# Patient Record
Sex: FEMALE | Race: WHITE | NOT HISPANIC OR LATINO | Employment: OTHER | URBAN - METROPOLITAN AREA
[De-identification: names, ages, dates, MRNs, and addresses within clinical notes are randomized per-mention and may not be internally consistent; named-entity substitution may affect disease eponyms.]

---

## 2017-01-24 ENCOUNTER — OFFICE VISIT (OUTPATIENT)
Dept: OPHTHALMOLOGY | Facility: CLINIC | Age: 62
End: 2017-01-24

## 2017-01-24 DIAGNOSIS — D23.10 PAPILLOMA OF EYELID, LEFT: ICD-10-CM

## 2017-01-24 DIAGNOSIS — H04.129 DRY EYE: Primary | ICD-10-CM

## 2017-01-24 DIAGNOSIS — H52.13 MYOPIA, BILATERAL: ICD-10-CM

## 2017-01-24 ASSESSMENT — REFRACTION_WEARINGRX
SPECS_TYPE: SVL
OS_SPHERE: -6.25
OD_SPHERE: -7.00
OD_CYLINDER: SPHERE
OS_CYLINDER: SPHERE

## 2017-01-24 ASSESSMENT — VISUAL ACUITY
METHOD: SNELLEN - LINEAR
OS_CC: 20/20
OS_SC: J7
OD_SC: J7
CORRECTION_TYPE: GLASSES
OD_CC: 20/20

## 2017-01-24 ASSESSMENT — KERATOMETRY
OS_AXISANGLE2_DEGREES: 171
OS_K1POWER_DIOPTERS: 44.00
OS_K2POWER_DIOPTERS: 44.50
OD_AXISANGLE_DEGREES: 072
OD_K1POWER_DIOPTERS: 44.25
OS_AXISANGLE_DEGREES: 081
OD_K2POWER_DIOPTERS: 44.50
OD_AXISANGLE2_DEGREES: 162

## 2017-01-24 ASSESSMENT — TONOMETRY
IOP_METHOD: ICARE
OD_IOP_MMHG: 15
OS_IOP_MMHG: 13

## 2017-01-24 ASSESSMENT — REFRACTION_CURRENTRX
OD_BASECURVE: 8.5
OS_BRAND: ALCON
OS_BASECURVE: 8.5
OS_SPHERE: -5.50
OS_DIAMETER: 14.1
OD_BRAND: ALCON
OD_SPHERE: -6.50
OD_DIAMETER: 14.1

## 2017-01-24 ASSESSMENT — EXTERNAL EXAM - RIGHT EYE: OD_EXAM: NORMAL

## 2017-01-24 ASSESSMENT — REFRACTION_MANIFEST
OS_SPHERE: -6.00
OD_CYLINDER: SPHERE
OD_ADD: +2.00
OS_ADD: +2.00
OD_SPHERE: -7.00
OS_CYLINDER: SPHERE

## 2017-01-24 ASSESSMENT — SLIT LAMP EXAM - LIDS: COMMENTS: NORMAL

## 2017-01-24 ASSESSMENT — EXTERNAL EXAM - LEFT EYE: OS_EXAM: NORMAL

## 2017-01-24 ASSESSMENT — CONF VISUAL FIELD
OD_NORMAL: 1
METHOD: COUNTING FINGERS
OS_NORMAL: 1

## 2017-01-24 NOTE — NURSING NOTE
Chief Complaints and History of Present Illnesses   Patient presents with     Annual Eye Exam     Contact Lens Evaluation     aware of $75 dollar fee     HPI    Affected eye(s):  Both   Symptoms:     No blurred vision      Frequency:  Constant       Do you have eye pain now?:  No      Comments:  Pt states vision seems more blurred with reading. Pt states that contacts are okay- but gets the monovision lenses mixed up. Pt did not bring contacts with and unsure of brand. Pt states would like to get fitted today and states she has dry eyes.    Loraine Bellamy COT 11:56 AM January 24, 2017

## 2017-01-24 NOTE — MR AVS SNAPSHOT
After Visit Summary   1/24/2017    Idalia Hoang    MRN: [id number]           Patient Information     Date Of Birth          1955        Visit Information        Provider Department      1/24/2017 11:40 AM Gera Tsang OD M [facility] Ophthalmology         Follow-ups after your visit        Your next 10 appointments already scheduled     Feb 07, 2017 11:00 AM   (Arrive by 10:45 AM)   Return Visit with MD SANTI Lemus [facility] Dermatology ([facility])    [address] [place] [address]  [address]rd [facility] [address] [phone]            Feb 07, 2017  1:00 PM   (Arrive by 12:45 PM)   RETURN GENERAL with RYAN Mix [facility] Ophthalmology ([facility])    [address] [place] [address]  [address]th [facility] [address] [phone]              Who to contact     Please call your clinic at [phone] to:    Ask questions about your health    Make or cancel appointments    Discuss your medicines    Learn about your test results    Speak to your doctor   If you have compliments or concerns about an experience at your clinic, or if you wish to file a complaint, please contact [facility] Patient Relations at [phone] or email us at [email or web address]         Additional Information About Your Visit        MyChart Information     [employer] gives you secure access to your electronic health record. If you see a primary care provider, you can also send messages to your care team and make appointments. If you have questions, please call your primary care clinic.  If you do not have a primary care provider, please call [phone] and they will assist you.      [employer] is an electronic gateway that provides easy, online access to your medical records. With [employer], you can request a clinic appointment, read your test results, renew a prescription or communicate with your care team.     To access your  existing account, please contact your [facility] or call [phone] for assistance.        Care EveryWhere ID     This is your Care EveryWhere ID. This could be used by other organizations to access your [facility] medical records  [id number]         Blood Pressure from Last 3 Encounters:   04/25/16 116/68   07/01/15 106/66   03/25/15 108/72    Weight from Last 3 Encounters:   04/25/16 65.772 kg (145 lb)   07/01/15 65.772 kg (145 lb)   04/27/15 65.772 kg (145 lb)              Today, you had the following     No orders found for display       Primary Care Provider Office Phone # Fax #    MARCOS Nelson [phone]       [facility] [address] [address]        Thank you!     Thank you for choosing [facility] OPHTHALMOLOGY  for your care. Our goal is always to provide you with excellent care. Hearing back from our patients is one way we can continue to improve our services. Please take a few minutes to complete the written survey that you may receive in the mail after your visit with us. Thank you!             Your Updated Medication List - Protect others around you: Learn how to safely use, store and throw away your medicines at www.disposemymeds.org.          This list is accurate as of: 1/24/17 12:50 PM.  Always use your most recent med list.                   Brand Name Dispense Instructions for use    doxycycline 100 MG capsule    VIBRAMYCIN    20 capsule    Take 1 capsule (100 mg) by mouth 2 times daily       fluocinonide 0.05 % ointment    LIDEX    60 g    Apply topically 2 times daily To areas of itch and rash on hand       * levothyroxine 88 MCG tablet    SYNTHROID/LEVOTHROID    90 tablet    TAKE ONE TABLET BY MOUTH ONCE DAILY       * levothyroxine 88 MCG tablet    SYNTHROID/LEVOTHROID    90 tablet    Take 1 tablet (88 mcg) by mouth daily       * levothyroxine 88 MCG tablet    SYNTHROID/LEVOTHROID    90 tablet    TAKE ONE TABLET  BY MOUTH ONCE DAILY       Urea 40 % Crea     198 g    Twice daily to areas of thick scaling on hands and feet.  Can cover with glove or sock at bedtime.       venlafaxine 37.5 MG 24 hr capsule    EFFEXOR-XR    30 capsule    Take 1 capsule (37.5 mg) by mouth daily       * Notice:  This list has 3 medication(s) that are the same as other medications prescribed for you. Read the directions carefully, and ask your doctor or other care provider to review them with you.

## 2017-01-25 DIAGNOSIS — F33.9 EPISODE OF RECURRENT MAJOR DEPRESSIVE DISORDER, UNSPECIFIED DEPRESSION EPISODE SEVERITY (H): Primary | ICD-10-CM

## 2017-01-27 RX ORDER — VENLAFAXINE HYDROCHLORIDE 37.5 MG/1
CAPSULE, EXTENDED RELEASE ORAL
Qty: 30 CAPSULE | Refills: 5 | Status: SHIPPED | OUTPATIENT
Start: 2017-01-27 | End: 2017-10-23

## 2017-01-27 NOTE — TELEPHONE ENCOUNTER
Effexor XR 37.5mg 24hr cap    Last Written Prescription Date: 12-  Last Fill Quantity: 30, # refills: 0  Last Office Visit with G, P or [facility] prescribing provider: 1-        BP Readings from Last 3 Encounters:   04/25/16 116/68   07/01/15 106/66   03/25/15 108/72     Pulse: (for Fetzima)  CREATININE   Date Value Ref Range Status   07/18/2016 0.78 0.52 - 1.04 mg/dL Final   ]    Last PHQ-9 score on record=   PHQ-9 SCORE 4/25/2016   Total Score -   Total Score 7

## 2017-02-07 ENCOUNTER — OFFICE VISIT (OUTPATIENT)
Dept: DERMATOLOGY | Facility: CLINIC | Age: 62
End: 2017-02-07

## 2017-02-07 ENCOUNTER — OFFICE VISIT (OUTPATIENT)
Dept: OPHTHALMOLOGY | Facility: CLINIC | Age: 62
End: 2017-02-07

## 2017-02-07 DIAGNOSIS — L57.8 SUN-DAMAGED SKIN: ICD-10-CM

## 2017-02-07 DIAGNOSIS — Z80.8 FAMILY HISTORY OF MELANOMA: ICD-10-CM

## 2017-02-07 DIAGNOSIS — Z12.83 SCREENING EXAM FOR SKIN CANCER: ICD-10-CM

## 2017-02-07 DIAGNOSIS — L28.0 LICHEN SIMPLEX CHRONICUS: Primary | ICD-10-CM

## 2017-02-07 DIAGNOSIS — H52.13 MYOPIA, BILATERAL: Primary | ICD-10-CM

## 2017-02-07 DIAGNOSIS — L81.4 SOLAR LENTIGO: ICD-10-CM

## 2017-02-07 DIAGNOSIS — D18.01 CHERRY ANGIOMA: ICD-10-CM

## 2017-02-07 DIAGNOSIS — L82.1 SK (SEBORRHEIC KERATOSIS): ICD-10-CM

## 2017-02-07 ASSESSMENT — CUP TO DISC RATIO
OD_RATIO: 0.15
OS_RATIO: 0.15

## 2017-02-07 ASSESSMENT — TONOMETRY
OS_IOP_MMHG: 20
IOP_METHOD: ICARE
OD_IOP_MMHG: 20

## 2017-02-07 ASSESSMENT — REFRACTION_CURRENTRX
OS_BASECURVE: 8.5
OS_BRAND: ALCON
OS_SPHERE: -5.50
OS_DIAMETER: 14.1
OD_BRAND: ALCON
OD_BASECURVE: 8.5
OD_SPHERE: -6.50
OD_DIAMETER: 14.1

## 2017-02-07 ASSESSMENT — VISUAL ACUITY
METHOD: SNELLEN - LINEAR
OS_CC: 20/20
OD_CC: 20/20
CORRECTION_TYPE: CONTACTS
OD_CC: J3
OS_CC: J3

## 2017-02-07 ASSESSMENT — EXTERNAL EXAM - RIGHT EYE: OD_EXAM: NORMAL

## 2017-02-07 ASSESSMENT — PAIN SCALES - GENERAL: PAINLEVEL: NO PAIN (0)

## 2017-02-07 ASSESSMENT — EXTERNAL EXAM - LEFT EYE: OS_EXAM: NORMAL

## 2017-02-07 ASSESSMENT — CONF VISUAL FIELD
OD_NORMAL: 1
METHOD: COUNTING FINGERS
OS_NORMAL: 1

## 2017-02-07 ASSESSMENT — SLIT LAMP EXAM - LIDS: COMMENTS: NORMAL

## 2017-02-07 NOTE — MR AVS SNAPSHOT
After Visit Summary   2/7/2017    Idalia Hoang    MRN: [id number]           Patient Information     Date Of Birth          1955        Visit Information        Provider Department      2/7/2017 11:00 AM Dawson Silver MD [facility]        Today's Diagnoses     Lichen simplex chronicus    -  1       Care Instructions    Instructions:  The areas on your nose are called Lentigines. You can treat this with tretinoin cream  -Tretinoin cream: use pea sized amount applied to the face. Use every other day for a week, then can start using daily. If your skin starts to peel and burn, stop for 2-3 days and then can resume  -Follow up in 1 year for a skin check      The ABCDEs of Melanoma    Skin cancer can develop anywhere on the skin. Ask someone for help when checking your skin, especially in hard to see places. If you notice a mole different from others, or that changes, enlarges, itches, or bleeds (even if it is small), you should see a dermatologist.          Sun Protection  Recommended use of a broad spectrum sunscreen of at least SPF 30 on all sun exposed sites daily.  Apply 20 minutes prior to exposure and repeat application every two hours or after sweating or swimming.  Avoid any intentional indoor or outdoor tanning.            Follow-ups after your visit        Your next 10 appointments already scheduled     Feb 07, 2017  1:00 PM   (Arrive by 12:45 PM)   RETURN GENERAL with RYAN Mix [facility] Ophthalmology ([facility])    [address] [place] [address] [phone]              Who to contact     Please call your clinic at [phone] to:    Ask questions about your health    Make or cancel appointments    Discuss your medicines    Learn about your test results    Speak to your doctor   If you have compliments or concerns about an experience at your clinic, or if you wish to file a complaint, please contact  [facility] Patient Relations at [phone] or email us at [email or web address]         Additional Information About Your Visit        [email or web address] Information     [email or web address] gives you secure access to your electronic health record. If you see a primary care provider, you can also send messages to your care team and make appointments. If you have questions, please call your primary care clinic.  If you do not have a primary care provider, please call [phone] and they will assist you.      [employer] is an electronic gateway that provides easy, online access to your medical records. With [employer] you can request a clinic appointment, read your test results, renew a prescription or communicate with your care team.     To access your existing account, please contact your [facility] or call [phone] for assistance.        Care EveryWhere ID     This is your Care EveryWhere ID. This could be used by other organizations to access your [place] medical records  [id number]         Blood Pressure from Last 3 Encounters:   04/25/16 116/68   07/01/15 106/66   03/25/15 108/72    Weight from Last 3 Encounters:   04/25/16 65.772 kg (145 lb)   07/01/15 65.772 kg (145 lb)   04/27/15 65.772 kg (145 lb)              Today, you had the following     No orders found for display         Today's Medication Changes          These changes are accurate as of: 2/7/17 12:17 PM.  If you have any questions, ask your nurse or doctor.               These medicines have changed or have updated prescriptions.        Dose/Directions    levothyroxine 88 MCG tablet   Commonly known as:  SYNTHROID/LEVOTHROID   This may have changed:  Another medication with the same name was removed. Continue taking this medication, and follow the directions you see here.   Used for:  Acquired hypothyroidism   Changed by:  Love Noriega, PA        TAKE ONE TABLET BY MOUTH ONCE DAILY   Quantity:   90 tablet   Refills:  2         Stop taking these medicines if you haven't already. Please contact your care team if you have questions.     doxycycline 100 MG capsule   Commonly known as:  VIBRAMYCIN   Stopped by:  Dawson Silver MD           fluocinonide 0.05 % ointment   Commonly known as:  LIDEX   Stopped by:  Dawson Silver MD                    Primary Care Provider Office Phone # Fax #    Love PAL MARCOS Noriega [phone]       [facility] [address] [place] [address]        Thank you!     Thank you for choosing [facility] DERMATOLOGY  for your care. Our goal is always to provide you with excellent care. Hearing back from our patients is one way we can continue to improve our services. Please take a few minutes to complete the written survey that you may receive in the mail after your visit with us. Thank you!             Your Updated Medication List - Protect others around you: Learn how to safely use, store and throw away your medicines at www.disposemymeds.org.          This list is accurate as of: 2/7/17 12:17 PM.  Always use your most recent med list.                   Brand Name Dispense Instructions for use    levothyroxine 88 MCG tablet    SYNTHROID/LEVOTHROID    90 tablet    TAKE ONE TABLET BY MOUTH ONCE DAILY       Urea 40 % Crea     198 g    Twice daily to areas of thick scaling on hands and feet.  Can cover with glove or sock at bedtime.       venlafaxine 37.5 MG 24 hr capsule    EFFEXOR-XR    30 capsule    TAKE ONE CAPSULE BY MOUTH ONCE DAILY

## 2017-02-07 NOTE — PROGRESS NOTES
Assessment/Plan  1. Compound myopia OU   Dispensed final contact lens prescription. Monitor annually. Patient to call clinic regarding lens ordering.  Contact Lens Billing  V-Code:  - Soft spherical  Final Contact Lens Rx      Brand Base Curve Diameter Sphere Lens   Right Mina 8.5 14.1 -6.50 Dailies Total 1   Left Mina 8.5 14.1 -5.50 Dailies Total 1       Expiration Date:  2/8/2019    Replacement:  Daily    Wearing Schedule:  Daily wear         Price per Unit: $95/90 pack    These are for cosmetic contact lenses.    Encounter Diagnosis   Name Primary?     Myopia, bilateral [H52.13] Yes        Date of last eye exam: 1/25/17           Complete documentation of historical and exam elements from today's encounter can  be found in the full encounter summary report (not reduplicated in this progress  note). I personally obtained the chief complaint(s) and history of present illness. I  confirmed and edited as necessary the review of systems, past medical/surgical  history, family history, social history, and examination findings as documented by  others; and I examined the patient myself. I personally reviewed the relevant tests,  images, and reports as documented above. I formulated and edited as necessary the  assessment and plan and discussed the findings and management plan with the  patient and family.    Gera Tsang OD, FAAO

## 2017-02-07 NOTE — PROGRESS NOTES
[facility] Dermatology Note      Dermatology Problem List:  1.Lichen simplex chronicus of the right palm- has had a partial response to Lidex ointment, also has used urea 40% cream for its keratolytic properties.  2. Tinea pedis with some aspect of plantar hyperkeratosis- used econazole cream twice daily for the next several weeks.  3. Family history of skin cancer- sister with melanoma, mother with SCC, maternal aunt with BCC    Encounter Date: Feb 7, 2017    CC:  Chief Complaint   Patient presents with     Skin Check     Idalia notes that her sister was recently diagnosed with desmoplastic melanoma. Idalia has a few spots of concern today.         History of Present Illness:  Ms. Idalia Hoang is a 61 year old female who presents for evaluation of skin with family history of melanoma and skin cancers.    Idalia's sister was recently diagnosed with melanoma, and for this reason presents today for a skin check. She has noticed 2 raised lesions on her upper left chest, which she thinks may have grown. Otherwise  no new or concerning lesions, she does not report any painful, bleeding, or non-healing skin lesions and has no other concerns at this time. She has had a lot of sun exposure 2/2 skiing in the 80s. She currently is using daily sunscreen on her face year round, and on any sun-exposed skin in the spring and summer.    Past Medical History:   Patient Active Problem List   Diagnosis     Advanced care planning/counseling discussion     Lichen simplex chronicus     Acquired hypothyroidism     Estradiol deficiency     Mixed hyperlipidemia     ACP (advance care planning)     Past Medical History   Diagnosis Date     Hypothyroidism 01/01/2011     Depression, recurrent 01/01/2011     Chronic serous otitis media, simple or unspecified 06/18/2012     Dysfunction of eustachian tube 06/18/2012     Osteopenia      Acquired hypothyroidism 4/25/2016     Estradiol deficiency 4/25/2016     Mixed hyperlipidemia  4/25/2016     Past Surgical History   Procedure Laterality Date     Surgical removal  2010     tendon nodule; free of disease     Colonoscopy  04/20/2012       Social History:  The patient denies use of tanning beds.    Family History:  There is no family history of skin cancer with melanoma in her sister, SCC in her mother and possibly BCC in her maternal aunt.     Medications:  Current Outpatient Prescriptions   Medication Sig Dispense Refill     venlafaxine (EFFEXOR-XR) 37.5 MG 24 hr capsule TAKE ONE CAPSULE BY MOUTH ONCE DAILY 30 capsule 5     levothyroxine (SYNTHROID, LEVOTHROID) 88 MCG tablet TAKE ONE TABLET BY MOUTH ONCE DAILY 90 tablet 2     Urea 40 % CREA Twice daily to areas of thick scaling on hands and feet.  Can cover with glove or sock at bedtime. 198 g 3     No Known Allergies      Review of Systems:  -Const: Denies fevers, chills or changes in weight.   -Constitutional: The patient denies fatigue, chills, unintended weight loss, and night sweats.  -HEENT: Reports recent URI with congestion, cough, and fever-which has since resolved. Patient denies nonhealing oral sores.  -Skin: As above in HPI. No additional skin concerns.    Physical exam:  Vitals: There were no vitals taken for this visit.  GEN: This is a well developed, well-nourished female in no acute distress, in a pleasant mood.    SKIN: Full skin, which includes the head/face, both arms, chest, back, abdomen,both legs, genitalia and/or groin buttocks, digits and/or nails, was examined.  -Scattered 2-6 mm homogenously brown and symmetric macules and papules with regular reticular pigment network on dermoscopy over the face, trunk and bilateral upper extremities  -Several light brown stuck on appearing papules on the trunk and upper extremities with cerebriform architecture on dermoscopy  -No other lesions of concern on areas examined.     Impression/Plan:  1. Family history of melanoma and non-melanotic skin cancer- hx of melanoma in sister,  SCC with mother, and non-melanoma with her maternal aunt.     ABCDs of melanoma were discussed and self skin checks were advised.     Sun precaution was advised including the use of sun screens of SPF 30 or higher, sun protective clothing, and avoidance of tanning beds.    2. Seborrheic keratosis, non irritated    Benign nature was discussed.No further intervention required at this time.     3. Cherry angioma(s)    Benign nature was discussed.No further intervention required at this time.     4. Solar lentigines on nose    Benign nature was discussed.No further intervention required at this time.     Tretinoin 0.025% cream applied to face daily    CC Dr. Love Noriega on close of this encounter.  Follow-up in 1 year, earlier for new or changing lesions.     Scribe Disclosure:   I, Josue Lopez, MS4, am serving as a scribe; to document services personally performed by Dr. Silver -based on data collection and the provider's statements to me.     Staff attestation:  The documentation recorded by the scribe accurately reflects the services I personally performed and the decisions I personally made.    Dawson Silver MD  Staff Dermatologist    Department of Dermatology

## 2017-02-07 NOTE — NURSING NOTE
Dermatology Rooming Note    Idalia Hoang's goals for this visit include:   Chief Complaint   Patient presents with     Skin Check     Idalia notes that her sister was recently diagnosed with desmoplastic melanoma. Idalia has a few spots of concern today.     Keisha Du, CMA

## 2017-02-07 NOTE — PATIENT INSTRUCTIONS
Instructions:  The areas on your nose are called Lentigines. You can treat this with tretinoin cream  -Tretinoin cream: use pea sized amount applied to the face. Use every other day for a week, then can start using daily. If your skin starts to peel and burn, stop for 2-3 days and then can resume  -Follow up in 1 year for a skin check      The ABCDEs of Melanoma    Skin cancer can develop anywhere on the skin. Ask someone for help when checking your skin, especially in hard to see places. If you notice a mole different from others, or that changes, enlarges, itches, or bleeds (even if it is small), you should see a dermatologist.          Sun Protection  Recommended use of a broad spectrum sunscreen of at least SPF 30 on all sun exposed sites daily.  Apply 20 minutes prior to exposure and repeat application every two hours or after sweating or swimming.  Avoid any intentional indoor or outdoor tanning.

## 2017-02-07 NOTE — LETTER
2/7/2017       RE: Idalia Hoang  [address] [facility] [address]     Dear Colleague,    Thank you for referring your patient, Idalia Hoang, to the [facility] DERMATOLOGY at [facility]. Please see a copy of my visit note below.    [facility] Dermatology Note      Dermatology Problem List:  1.Lichen simplex chronicus of the right palm- has had a partial response to Lidex ointment, also has used urea 40% cream for its keratolytic properties.  2. Tinea pedis with some aspect of plantar hyperkeratosis- used econazole cream twice daily for the next several weeks.  3. Family history of skin cancer- sister with melanoma, mother with SCC, maternal aunt with BCC    Encounter Date: Feb 7, 2017    CC:  Chief Complaint   Patient presents with     Skin Check     Idalia notes that her sister was recently diagnosed with desmoplastic melanoma. Idalia has a few spots of concern today.         History of Present Illness:  Ms. Idalia Hoang is a 61 year old female who presents for evaluation of skin with family history of melanoma and skin cancers.    Idalia's sister was recently diagnosed with melanoma, and for this reason presents today for a skin check. She has noticed 2 raised lesions on her upper left chest, which she thinks may have grown. Otherwise  no new or concerning lesions, she does not report any painful, bleeding, or non-healing skin lesions and has no other concerns at this time. She has had a lot of sun exposure 2/2 skiing in the 80s. She currently is using daily sunscreen on her face year round, and on any sun-exposed skin in the spring and summer.    Past Medical History:   Patient Active Problem List   Diagnosis     Advanced care planning/counseling discussion     Lichen simplex chronicus     Acquired hypothyroidism     Estradiol deficiency     Mixed hyperlipidemia     ACP (advance care planning)     Past Medical History   Diagnosis Date     Hypothyroidism  01/01/2011     Depression, recurrent 01/01/2011     Chronic serous otitis media, simple or unspecified 06/18/2012     Dysfunction of eustachian tube 06/18/2012     Osteopenia      Acquired hypothyroidism 4/25/2016     Estradiol deficiency 4/25/2016     Mixed hyperlipidemia 4/25/2016     Past Surgical History   Procedure Laterality Date     Surgical removal  2010     tendon nodule; free of disease     Colonoscopy  04/20/2012       Social History:  The patient denies use of tanning beds.    Family History:  There is no family history of skin cancer with melanoma in her sister, SCC in her mother and possibly BCC in her maternal aunt.     Medications:  Current Outpatient Prescriptions   Medication Sig Dispense Refill     venlafaxine (EFFEXOR-XR) 37.5 MG 24 hr capsule TAKE ONE CAPSULE BY MOUTH ONCE DAILY 30 capsule 5     levothyroxine (SYNTHROID, LEVOTHROID) 88 MCG tablet TAKE ONE TABLET BY MOUTH ONCE DAILY 90 tablet 2     Urea 40 % CREA Twice daily to areas of thick scaling on hands and feet.  Can cover with glove or sock at bedtime. 198 g 3     No Known Allergies      Review of Systems:  -Const: Denies fevers, chills or changes in weight.   -Constitutional: The patient denies fatigue, chills, unintended weight loss, and night sweats.  -HEENT: Reports recent URI with congestion, cough, and fever-which has since resolved. Patient denies nonhealing oral sores.  -Skin: As above in HPI. No additional skin concerns.    Physical exam:  Vitals: There were no vitals taken for this visit.  GEN: This is a well developed, well-nourished female in no acute distress, in a pleasant mood.    SKIN: Full skin, which includes the head/face, both arms, chest, back, abdomen,both legs, genitalia and/or groin buttocks, digits and/or nails, was examined.  -Scattered 2-6 mm homogenously brown and symmetric macules and papules with regular reticular pigment network on dermoscopy over the face, trunk and bilateral upper extremities  -Several  light brown stuck on appearing papules on the trunk and upper extremities with cerebriform architecture on dermoscopy  -No other lesions of concern on areas examined.     Impression/Plan:  1. Family history of melanoma and non-melanotic skin cancer- hx of melanoma in sister, SCC with mother, and non-melanoma with her maternal aunt.     ABCDs of melanoma were discussed and self skin checks were advised.     Sun precaution was advised including the use of sun screens of SPF 30 or higher, sun protective clothing, and avoidance of tanning beds.    2. Seborrheic keratosis, non irritated    Benign nature was discussed.No further intervention required at this time.     3. Cherry angioma(s)    Benign nature was discussed.No further intervention required at this time.     4. Solar lentigines on nose    Benign nature was discussed.No further intervention required at this time.     Tretinoin 0.025% cream applied to face daily    CC Dr. Love Noriega on close of this encounter.  Follow-up in 1 year, earlier for new or changing lesions.     Scribe Disclosure:   I, Josue Lopez, MS4, am serving as a scribe; to document services personally performed by Dr. Silver -based on data collection and the provider's statements to me.     Staff attestation:  The documentation recorded by the scribe accurately reflects the services I personally performed and the decisions I personally made.    Dawson Silver MD  Staff Dermatologist    Department of Dermatology

## 2017-02-07 NOTE — NURSING NOTE
Chief Complaints and History of Present Illnesses   Patient presents with     Follow Up For     contact lens      Annual Eye Exam     dilated exam     HPI    Affected eye(s):  Both   Symptoms:     No blurred vision   No decreased vision      Frequency:  Constant       Do you have eye pain now?:  No      Comments:  Pt states has only worn the contacts twice because of hay fever. Pt states they feel good today but had more trouble with the comfort yesterday. Pt feels like vision wasn't great yesterday either. No pain. No drops.    Loraine Bellamy COT 1:27 PM February 7, 2017

## 2017-02-25 PROBLEM — Z80.8 FAMILY HISTORY OF MELANOMA: Status: ACTIVE | Noted: 2017-02-25

## 2017-02-25 RX ORDER — TRETINOIN 0.25 MG/G
CREAM TOPICAL
Qty: 45 G | Refills: 3 | Status: SHIPPED | OUTPATIENT
Start: 2017-02-25 | End: 2017-10-23

## 2017-02-25 RX ORDER — UREA 40 %
CREAM (GRAM) TOPICAL
Qty: 198 G | Refills: 3 | Status: SHIPPED | OUTPATIENT
Start: 2017-02-25 | End: 2017-10-23

## 2017-03-06 ENCOUNTER — TELEPHONE (OUTPATIENT)
Dept: DERMATOLOGY | Facility: CLINIC | Age: 62
End: 2017-03-06

## 2017-03-06 NOTE — TELEPHONE ENCOUNTER
Prior Authorization Retail Medication Request  Medication/Dose: tretinoin (RETIN-A) 0.025 % cream  Diagnosis and ICD code:   Solar lentigo [L81.4]       Sun-damaged skin [L57.8]           New/Renewal/Insurance Change PA: new  Previously Tried and Failed Therapies: See chart    Insurance ID (if provided):   Coverage information:     Subscriber: [id number] DEON ACEVEDO     Rel to sub: 01 - Self     Member ID: [id number]     Payor: [address] [place] [address] Ph: [phone]     Benefit plan: [address] [place] [address] Ph: [phone]     Group number: [id number]     Member effective dates: from 01/01/17        Insurance Phone (if provided): [phone]     Any additional info from fax request:     If you received a fax notification from an outside Pharmacy:  Pharmacy Name: [facility]  Pharmacy #: Unknown  Pharmacy Fax: [phone]

## 2017-03-06 NOTE — TELEPHONE ENCOUNTER
Prior Authorization Retail Medication Request  Medication/Dose: Urea 40 % CREA  Diagnosis and ICD code: Lichen simplex chronicus [L28.0]  - Primary   New/Renewal/Insurance Change PA: renewal  Previously Tried and Failed Therapies: See chart    Insurance ID (if provided):   Coverage information:     Subscriber: [id number] DEON ACEVEDO     Rel to sub: 01 - Self     Member ID: [id number]     Payor: [address] [place] [address] Ph: [phone]     Benefit plan: [address] [place] [address] Ph: [phone]     Group number: [id number]     Member effective dates: from 01/01/17        Insurance Phone (if provided): [phone]    Any additional info from fax request:     If you received a fax notification from an outside Pharmacy:  [facility]  Pharmacy #:   Pharmacy Fax: [phone]

## 2017-03-31 NOTE — TELEPHONE ENCOUNTER
[facility] Prior Authorization Team   Phone: [phone]  Fax: [phone]      PA Initiation    Medication: Urea 40 % CREAM  Insurance Company: [employer] - Phone [phone] Fax [phone]  Pharmacy Filling the Rx: [facility] [address] [place], [place] - [address]   Filling Pharmacy Phone: [phone]  Filling Pharmacy Fax:    Start Date: 3/31/2017

## 2017-03-31 NOTE — TELEPHONE ENCOUNTER
[facility] Prior Authorization Team   Phone: [phone]  Fax: [phone]      PA Initiation    Medication: tretinoin (RETIN-A) 0.025 % cream  Insurance Company:    Pharmacy Filling the Rx: [facility] [id number]  [place] - [address]   Filling Pharmacy Phone: [phone]  Filling Pharmacy Fax: [phone]  Start Date: 3/31/2017

## 2017-03-31 NOTE — TELEPHONE ENCOUNTER
Patient wanting to know status of prior authorization for urea cream. This nurse spoke to the PA team (Shelbi) who stated she was working on it now and would get it submitted to insurance. Patient was called but did not answer. Left message for patient to call clinic.

## 2017-04-03 NOTE — TELEPHONE ENCOUNTER
Prior Authorization Approval    Authorization Effective Date: 3/30/2017  Authorization Expiration Date: 4/1/2018  Medication: Urea 40 % CREAM - approved  Approved Dose/Quantity:   Reference #: [id number]   Insurance Company: [employer] - Phone [phone] Fax [phone]  Expected CoPay: $12.00     CoPay Card Available:      Foundation Assistance Needed:    Which Pharmacy is filling the prescription (Not needed for infusion/clinic administered): [facility] [address] [facility] [address] [facility] [address]  Pharmacy Notified: Yes  Patient Notified: Yes

## 2017-04-03 NOTE — TELEPHONE ENCOUNTER
PRIOR AUTHORIZATION DENIED    Medication: tretinoin (RETIN-A) 0.025 % cream - denied    Denial Date: 4/1/2017    Denial Rational: script is denied because pt needs a diagnosis of acne vulgaris or actinic keratosis                  Appeal Information:

## 2017-04-06 NOTE — TELEPHONE ENCOUNTER
Because this diagnosis is considered cosmetic, tretinoin has been denied. Spoke with Idalia and relayed this info. She can  this prescription at [facility] for $68 with a coupon from [employer]. Idalia was appreciative of the call.

## 2017-07-01 ENCOUNTER — HEALTH MAINTENANCE LETTER (OUTPATIENT)
Age: 62
End: 2017-07-01

## 2017-07-31 DIAGNOSIS — E03.9 ACQUIRED HYPOTHYROIDISM: ICD-10-CM

## 2017-08-01 NOTE — TELEPHONE ENCOUNTER
Levothyroxine 88MCG     Last Written Prescription Date: 7/19/2016  Last Quantity: 90, # refills: 2  [place] [facility] [place] [facility] or [facility] prescribing provider: 4/25/16        TSH   Date Value Ref Range Status   07/18/2016 0.84 0.40 - 4.00 mU/L Final

## 2017-08-02 RX ORDER — LEVOTHYROXINE SODIUM 88 UG/1
TABLET ORAL
Qty: 30 TABLET | Refills: 0 | Status: SHIPPED | OUTPATIENT
Start: 2017-08-02 | End: 2017-08-09

## 2017-08-06 DIAGNOSIS — E03.9 ACQUIRED HYPOTHYROIDISM: ICD-10-CM

## 2017-08-09 ENCOUNTER — TELEPHONE (OUTPATIENT)
Dept: FAMILY MEDICINE | Facility: OTHER | Age: 62
End: 2017-08-09

## 2017-08-09 RX ORDER — LEVOTHYROXINE SODIUM 88 UG/1
TABLET ORAL
Qty: 90 TABLET | Refills: 0 | OUTPATIENT
Start: 2017-08-09

## 2017-08-09 RX ORDER — LEVOTHYROXINE SODIUM 88 UG/1
88 TABLET ORAL DAILY
Qty: 30 TABLET | Refills: 0 | Status: SHIPPED | OUTPATIENT
Start: 2017-08-09 | End: 2017-10-06

## 2017-08-09 NOTE — TELEPHONE ENCOUNTER
Please call patient to inform them medication has been refilled for 1 month.    Please let her know she is due for office visit.  Thank you.

## 2017-08-09 NOTE — TELEPHONE ENCOUNTER
Pt called to check on status of med. [facility] did not receive this. Please call pt back at [phone]

## 2017-08-09 NOTE — TELEPHONE ENCOUNTER
Levothyroxine     Last Written Prescription Date: 8/02/2017  Last Quantity: 90, # refills: 0  Last Office Visit with [facility], P or [facility] prescribing provider: 4/25/2016        TSH   Date Value Ref Range Status   07/18/2016 0.84 0.40 - 4.00 mU/L Final

## 2017-08-09 NOTE — TELEPHONE ENCOUNTER
Left message for patient to make a follow up appt with labs before next med refill is needed.  Sarita Jeff LPN

## 2017-10-06 DIAGNOSIS — E03.9 ACQUIRED HYPOTHYROIDISM: ICD-10-CM

## 2017-10-06 RX ORDER — LEVOTHYROXINE SODIUM 88 UG/1
TABLET ORAL
Qty: 30 TABLET | Refills: 0 | Status: SHIPPED | OUTPATIENT
Start: 2017-10-06 | End: 2017-11-05

## 2017-10-06 NOTE — TELEPHONE ENCOUNTER
Levothyroxine     Last Written Prescription Date: 8/9/17  Last Quantity: 30, # refills: 0  Last Office Visit with [facility], [facility] or [facility] prescribing provider: 4/25/16   Next 5 appointments (look out 90 days)     Oct 19, 2017  2:15 PM CDT   (Arrive by 2:00 PM)   PHYSICAL with MARCOS Nelson   [facility]    [address] [place] [address]   [phone]                   TSH   Date Value Ref Range Status   07/18/2016 0.84 0.40 - 4.00 mU/L Final

## 2017-10-20 DIAGNOSIS — F33.9 EPISODE OF RECURRENT MAJOR DEPRESSIVE DISORDER, UNSPECIFIED DEPRESSION EPISODE SEVERITY (H): ICD-10-CM

## 2017-10-20 RX ORDER — VENLAFAXINE HYDROCHLORIDE 37.5 MG/1
CAPSULE, EXTENDED RELEASE ORAL
Qty: 30 CAPSULE | Refills: 5 | Status: CANCELLED | OUTPATIENT
Start: 2017-10-20

## 2017-10-23 ENCOUNTER — OFFICE VISIT (OUTPATIENT)
Dept: FAMILY MEDICINE | Facility: OTHER | Age: 62
End: 2017-10-23
Attending: NURSE PRACTITIONER
Payer: COMMERCIAL

## 2017-10-23 VITALS
SYSTOLIC BLOOD PRESSURE: 112 MMHG | WEIGHT: 147 LBS | OXYGEN SATURATION: 97 % | BODY MASS INDEX: 24.49 KG/M2 | DIASTOLIC BLOOD PRESSURE: 78 MMHG | TEMPERATURE: 99 F | HEART RATE: 81 BPM | HEIGHT: 65 IN | RESPIRATION RATE: 18 BRPM

## 2017-10-23 DIAGNOSIS — L28.0 LICHEN SIMPLEX CHRONICUS: ICD-10-CM

## 2017-10-23 DIAGNOSIS — F33.9 EPISODE OF RECURRENT MAJOR DEPRESSIVE DISORDER, UNSPECIFIED DEPRESSION EPISODE SEVERITY (H): ICD-10-CM

## 2017-10-23 DIAGNOSIS — Z00.00 ROUTINE GENERAL MEDICAL EXAMINATION AT A HEALTH CARE FACILITY: Primary | ICD-10-CM

## 2017-10-23 LAB
SPECIMEN SOURCE: NORMAL
TSH SERPL DL<=0.005 MIU/L-ACNC: 1.4 MU/L (ref 0.4–4)
WET PREP SPEC: NORMAL

## 2017-10-23 PROCEDURE — G0123 SCREEN CERV/VAG THIN LAYER: HCPCS | Performed by: NURSE PRACTITIONER

## 2017-10-23 PROCEDURE — 84443 ASSAY THYROID STIM HORMONE: CPT | Performed by: NURSE PRACTITIONER

## 2017-10-23 PROCEDURE — 90686 IIV4 VACC NO PRSV 0.5 ML IM: CPT | Performed by: NURSE PRACTITIONER

## 2017-10-23 PROCEDURE — 87210 SMEAR WET MOUNT SALINE/INK: CPT | Performed by: NURSE PRACTITIONER

## 2017-10-23 PROCEDURE — 87624 HPV HI-RISK TYP POOLED RSLT: CPT | Mod: 90 | Performed by: NURSE PRACTITIONER

## 2017-10-23 PROCEDURE — 99000 SPECIMEN HANDLING OFFICE-LAB: CPT | Performed by: NURSE PRACTITIONER

## 2017-10-23 PROCEDURE — 90471 IMMUNIZATION ADMIN: CPT | Performed by: NURSE PRACTITIONER

## 2017-10-23 PROCEDURE — 36415 COLL VENOUS BLD VENIPUNCTURE: CPT | Performed by: NURSE PRACTITIONER

## 2017-10-23 PROCEDURE — 99396 PREV VISIT EST AGE 40-64: CPT | Mod: 25 | Performed by: NURSE PRACTITIONER

## 2017-10-23 RX ORDER — VENLAFAXINE HYDROCHLORIDE 37.5 MG/1
CAPSULE, EXTENDED RELEASE ORAL
Qty: 30 CAPSULE | Refills: 5 | Status: SHIPPED | OUTPATIENT
Start: 2017-10-23 | End: 2018-05-31

## 2017-10-23 RX ORDER — UREA 40 %
CREAM (GRAM) TOPICAL
Qty: 198 G | Refills: 3 | Status: SHIPPED | OUTPATIENT
Start: 2017-10-23 | End: 2019-06-18

## 2017-10-23 ASSESSMENT — ANXIETY QUESTIONNAIRES
4. TROUBLE RELAXING: NOT AT ALL
6. BECOMING EASILY ANNOYED OR IRRITABLE: NOT AT ALL
IF YOU CHECKED OFF ANY PROBLEMS ON THIS QUESTIONNAIRE, HOW DIFFICULT HAVE THESE PROBLEMS MADE IT FOR YOU TO DO YOUR WORK, TAKE CARE OF THINGS AT HOME, OR GET ALONG WITH OTHER PEOPLE: NOT DIFFICULT AT ALL
5. BEING SO RESTLESS THAT IT IS HARD TO SIT STILL: NOT AT ALL
7. FEELING AFRAID AS IF SOMETHING AWFUL MIGHT HAPPEN: NOT AT ALL
1. FEELING NERVOUS, ANXIOUS, OR ON EDGE: NOT AT ALL
2. NOT BEING ABLE TO STOP OR CONTROL WORRYING: NOT AT ALL
GAD7 TOTAL SCORE: 0
3. WORRYING TOO MUCH ABOUT DIFFERENT THINGS: NOT AT ALL

## 2017-10-23 ASSESSMENT — PAIN SCALES - GENERAL: PAINLEVEL: NO PAIN (0)

## 2017-10-23 ASSESSMENT — PATIENT HEALTH QUESTIONNAIRE - PHQ9: SUM OF ALL RESPONSES TO PHQ QUESTIONS 1-9: 4

## 2017-10-23 NOTE — NURSING NOTE
"Chief Complaint   Patient presents with     Physical       Initial /78 (BP Location: Left arm, Patient Position: Sitting, Cuff Size: Adult Large)  Pulse 81  Temp 99  F (37.2  C) (Tympanic)  Resp 18  Ht 5' 5\" (1.651 m)  Wt 147 lb (66.7 kg)  SpO2 97%  BMI 24.46 kg/m2 Estimated body mass index is 24.46 kg/(m^2) as calculated from the following:    Height as of this encounter: 5' 5\" (1.651 m).    Weight as of this encounter: 147 lb (66.7 kg).  Medication Reconciliation: complete    Whitney Glover    "

## 2017-10-23 NOTE — PROGRESS NOTES
SUBJECTIVE:   CC: Idalia Hoang is an 61 year old woman who presents for preventive health visit.     Healthy Habits:    Do you get at least three servings of calcium containing foods daily (dairy, green leafy vegetables, etc.)? no, taking calcium and/or vitamin D supplement: yes - 2000IU    Amount of exercise or daily activities, outside of work: does not normally exercise, but know she needs to start    Problems taking medications regularly No    Medication side effects: No    Have you had an eye exam in the past two years? Yes - [facility]    Do you see a dentist twice per year? yes    Do you have sleep apnea, excessive snoring or daytime drowsiness?day time drowsiness - did a sleep study at one time         Depression Followup    Status since last visit: Stable with medication    See PHQ-9 for current symptoms.  PHQ-9 SCORE 2015 2016 10/23/2017   Total Score 7 - -   Total Score - 7 4       BRADLY-7 SCORE 2016 10/23/2017   Total Score 0 0         Other associated symptoms: wants to sleep more - possible due to the weather    Complicating factors:   Significant life event:  Yes-  Mom  this year   Current substance abuse:  None  Anxiety or Panic symptoms:  No    PHQ-9 Score and MyChart F/U Questions 2016 10/23/2017   Total Score 7 4   Q9: Suicide Ideation Not at all Not at all       PHQ-9  English  PHQ-9   Any Language  Suicide Assessment Five-step Evaluation and Treatment (SAFE-T)  Hypothyroidism Follow-up      Since last visit, patient describes the following symptoms: Weight stable, no hair loss, no skin changes, no constipation, no loose stools        Today's PHQ-2 Score: No flowsheet data found.    Abuse: Current or Past(Physical, Sexual or Emotional)- No  Do you feel safe in your environment - No    Social History   Substance Use Topics     Smoking status: Former Smoker     Packs/day: 0.30     Years: 1.00     Types: Cigarettes     Smokeless tobacco: Never Used      Comment: quit ; no  passive exposure     Alcohol use Yes      Comment: 1 drink beer and wine daily     The patient does not drink >3 drinks per day nor >7 drinks per week.    Reviewed orders with patient.  Reviewed health maintenance and updated orders accordingly - Yes  Patient Active Problem List   Diagnosis     Advanced care planning/counseling discussion     Lichen simplex chronicus     Acquired hypothyroidism     Estradiol deficiency     Mixed hyperlipidemia     ACP (advance care planning)     Family history of melanoma     Past Surgical History:   Procedure Laterality Date     COLONOSCOPY  04/20/2012     surgical removal  2010    tendon nodule; free of disease       Social History   Substance Use Topics     Smoking status: Former Smoker     Packs/day: 0.30     Years: 1.00     Types: Cigarettes     Smokeless tobacco: Never Used      Comment: quit 1989; no passive exposure     Alcohol use Yes      Comment: 1 drink beer and wine daily     Family History   Problem Relation Age of Onset     Hypertension Sister      Melanoma Sister      C.A.D. Other      grandmother     CANCER Other      lung; grandmother/leukemia; grandfather     Depression Other      family h/o     Hypertension Other      grandmother     Breast Cancer Sister      Other - See Comments Mother      memory loss/sjogrensyndrome/stomach     OSTEOPOROSIS Mother      Alzheimer Disease Mother      Hyperlipidemia Sister      DIABETES Father      Coronary Artery Disease Father      a fib     Thyroid Disease Sister      Asthma Brother          Current Outpatient Prescriptions   Medication Sig Dispense Refill     levothyroxine (SYNTHROID/LEVOTHROID) 88 MCG tablet TAKE ONE TABLET BY MOUTH ONCE DAILY 30 tablet 0     Urea 40 % CREA Twice daily to areas of thick scaling on hands and feet.  Can cover with glove or sock at bedtime. 198 g 3     venlafaxine (EFFEXOR-XR) 37.5 MG 24 hr capsule TAKE ONE CAPSULE BY MOUTH ONCE DAILY 30 capsule 5     No Known Allergies      Patient over age  "50, mutual decision to screen reflected in health maintenance.      Pertinent mammograms are reviewed under the imaging tab. Would like to wait until next year to have her mammogram - 2018  History of abnormal Pap smear: NO - age 30- 65 PAP every 3 years recommended    Reviewed and updated as needed this visit by clinical staffTobacco  Allergies  Meds  Med Hx  Surg Hx  Fam Hx  Soc Hx        Reviewed and updated as needed this visit by Provider            ROS:  CONSTITUTIONAL:feeling a little more chilled and has occasional night sweats   INTEGUMENTARY/SKIN: thickening on the skin uses urea cream  EYES: NEGATIVE for vision changes or irritation - eye exam towards the beginning of the year  ENT: NEGATIVE for ear, mouth and throat problems  RESP:NEGATIVE for significant cough or SOB  BREAST: NEGATIVE for masses, tenderness or discharge  CV: NEGATIVE for chest pain, palpitations or peripheral edema  GI: NEGATIVE for nausea, abdominal pain, heartburn, or change in bowel habits - uses probiotic on a regular basis   menopausal female:noticed a slight urgency   M: NEGATIVE for significant arthralgias or myalgia  N: NEGATIVE for weakness, dizziness or paresthesias  ENDOCRINE: Hx thyroid disease  HEME/ALLERGY/IMMUNE: occasional night sweats  PSYCHIATRIC: HX depression     OBJECTIVE:   /78 (BP Location: Left arm, Patient Position: Sitting, Cuff Size: Adult Large)  Pulse 81  Temp 99  F (37.2  C) (Tympanic)  Resp 18  Ht 5' 5\" (1.651 m)  Wt 147 lb (66.7 kg)  SpO2 97%  BMI 24.46 kg/m2  EXAM:  GENERAL APPEARANCE: healthy, alert and no distress  EYES: Eyes grossly normal to inspection, PERRL and conjunctivae and sclerae normal  HENT: ear canals and TM's normal, nose and mouth without ulcers or lesions, oropharynx clear and oral mucous membranes moist  NECK: no adenopathy, no asymmetry, masses, or scars and thyroid normal to palpation  RESP: lungs clear to auscultation - no rales, rhonchi or wheezes  BREAST: " normal without masses, tenderness or nipple discharge and no palpable axillary masses or adenopathy  CV: regular rate and rhythm, normal S1 S2, no S3 or S4, no murmur, click or rub, no peripheral edema and peripheral pulses strong  ABDOMEN: soft, nontender, no hepatosplenomegaly, no masses and bowel sounds normal   (female): normal female external genitalia, normal urethral meatus and normal cervix, adnexae, and uterus without masses.  MS: no musculoskeletal defects are noted and gait is age appropriate without ataxia  SKIN: no suspicious lesions or rashes  NEURO: Normal strength and tone, sensory exam grossly normal, mentation intact and speech normal  PSYCH: mentation appears normal and affect normal/bright    ASSESSMENT/PLAN:   1. Lichen simplex chronicus  Continue current plan of care  - Urea 40 % CREA; Twice daily to areas of thick scaling on hands and feet.  Can cover with glove or sock at bedtime.  Dispense: 198 g; Refill: 3    2. Episode of recurrent major depressive disorder, unspecified depression episode severity (H)  Continue current plan of care  - venlafaxine (EFFEXOR-XR) 37.5 MG 24 hr capsule; TAKE ONE CAPSULE BY MOUTH ONCE DAILY  Dispense: 30 capsule; Refill: 5    3. Routine general medical examination at a health care facility  Flu vaccine updated. Increase physical activity. TSH level pending. Mammogram deferred until next year. Continue current plan of care  -  FLU VAC PRESRV FREE QUAD SPLIT VIR 3+YRS IM  - ADMIN 1st VACCINE  - Lipid Profile (RANGE); Future  - TSH  - A pap thin layer screen  - A pap thin layer diagnostic  - Wet prep    COUNSELING:   Reviewed preventive health counseling, as reflected in patient instructions       Regular exercise       Healthy diet/nutrition       Vision screening         reports that she has quit smoking. Her smoking use included Cigarettes. She has a 0.30 pack-year smoking history. She has never used smokeless tobacco.    Estimated body mass index is 24.46  "kg/(m^2) as calculated from the following:    Height as of this encounter: 5' 5\" (1.651 m).    Weight as of this encounter: 147 lb (66.7 kg).         Counseling Resources:  ATP IV Guidelines  Pooled Cohorts Equation Calculator  Breast Cancer Risk Calculator  FRAX Risk Assessment  ICSI Preventive Guidelines  Dietary Guidelines for Americans, 2010  USDA's MyPlate  ASA Prophylaxis  Lung CA Screening    STACEY Dang [facility] [place]  "

## 2017-10-23 NOTE — MR AVS SNAPSHOT
After Visit Summary   10/23/2017    Idalia Hoang    MRN: [id number]           Patient Information     Date Of Birth          1955        Visit Information        Provider Department      10/23/2017 1:30 PM Leslie Marino APRN [facility]        Today's Diagnoses     Routine general medical examination at a health care facility    -  1    Lichen simplex chronicus        Episode of recurrent major depressive disorder, unspecified depression episode severity (H)          Care Instructions      Preventive Health Recommendations  Female Ages 50 - 64    Yearly exam: See your health care provider every year in order to  o Review health changes.   o Discuss preventive care.    o Review your medicines if your doctor has prescribed any.      Get a Pap test every three years (unless you have an abnormal result and your provider advises testing more often).    If you get Pap tests with HPV test, you only need to test every 5 years, unless you have an abnormal result.     You do not need a Pap test if your uterus was removed (hysterectomy) and you have not had cancer.    You should be tested each year for STDs (sexually transmitted diseases) if you're at risk.     Have a mammogram every 1 to 2 years.    Have a colonoscopy at age 50, or have a yearly FIT test (stool test). These exams screen for colon cancer.      Have a cholesterol test every 5 years, or more often if advised.    Have a diabetes test (fasting glucose) every three years. If you are at risk for diabetes, you should have this test more often.     If you are at risk for osteoporosis (brittle bone disease), think about having a bone density scan (DEXA).    Shots: Get a flu shot each year. Get a tetanus shot every 10 years.    Nutrition:     Eat at least 5 servings of fruits and vegetables each day.    Eat whole-grain bread, whole-wheat pasta and brown rice instead of white grains and rice.    Talk to your provider about  Calcium and Vitamin D.     Lifestyle    Exercise at least 150 minutes a week (30 minutes a day, 5 days a week). This will help you control your weight and prevent disease.    Limit alcohol to one drink per day.    No smoking.     Wear sunscreen to prevent skin cancer.     See your dentist every six months for an exam and cleaning.    See your eye doctor every 1 to 2 years.            Follow-ups after your visit        Future tests that were ordered for you today     Open Future Orders        Priority Expected Expires Ordered    Lipid Profile (RANGE) Routine  10/23/2018 10/23/2017            Who to contact     If you have questions or need follow up information about today's clinic visit or your schedule please contact [facility] directly at [phone].  Normal or non-critical lab and imaging results will be communicated to you by Kaminariohart, letter or phone within 4 business days after the clinic has received the results. If you do not hear from us within 7 days, please contact the clinic through MePleaset or phone. If you have a critical or abnormal lab result, we will notify you by phone as soon as possible.  Submit refill requests through uchoose or call your pharmacy and they will forward the refill request to us. Please allow 3 business days for your refill to be completed.          Additional Information About Your Visit        uchoose Information     uchoose gives you secure access to your electronic health record. If you see a primary care provider, you can also send messages to your care team and make appointments. If you have questions, please call your primary care clinic.  If you do not have a primary care provider, please call [phone] and they will assist you.        Care EveryWhere ID     This is your Care EveryWhere ID. This could be used by other organizations to access your [place] medical records  [id number]        Your Vitals Were     Pulse Temperature Respirations Height Pulse  "Oximetry BMI (Body Mass Index)    81 99  F (37.2  C) (Tympanic) 18 5' 5\" (1.651 m) 97% 24.46 kg/m2       Blood Pressure from Last 3 Encounters:   10/23/17 112/78   04/25/16 116/68   07/01/15 106/66    Weight from Last 3 Encounters:   10/23/17 147 lb (66.7 kg)   04/25/16 145 lb (65.8 kg)   07/01/15 145 lb (65.8 kg)              We Performed the Following     A pap thin layer diagnostic     A pap thin layer screen     ADMIN 1st VACCINE     HC FLU VAC PRESRV FREE QUAD SPLIT VIR 3+YRS IM     TSH     Wet prep          Today's Medication Changes          These changes are accurate as of: 10/23/17  2:20 PM.  If you have any questions, ask your nurse or doctor.               These medicines have changed or have updated prescriptions.        Dose/Directions    venlafaxine 37.5 MG 24 hr capsule   Commonly known as:  EFFEXOR-XR   This may have changed:  See the new instructions.   Used for:  Episode of recurrent major depressive disorder, unspecified depression episode severity (H)   Changed by:  Leslie Marino APRN CNP        TAKE ONE CAPSULE BY MOUTH ONCE DAILY   Quantity:  30 capsule   Refills:  5         Stop taking these medicines if you haven't already. Please contact your care team if you have questions.     tretinoin 0.025 % cream   Commonly known as:  RETIN-A   Stopped by:  Leslie Marino APRN CNP                Where to get your medicines      These medications were sent to [facility] - [place], [place] - [address]   [id number] , [place] [address]     Phone:  [phone]     Urea 40 % Crea    venlafaxine 37.5 MG 24 hr capsule                Primary Care Provider Office Phone # Fax #    MARCOS Nelson [phone]       [facility] [address]  [place] [address]        Equal Access to Services     [facility] ALEJANDRA AH: Hadii dax napoles hadasho Soomaali, waaxda luqadaha, qaybta kaalmada adeegyada, michelle leong ah. So wa " [phone].    ATENCIÓN: Si julio cesar schneider, tiene a lo disposición servicios gratuitos de asistencia lingüística. Mulu valadez [phone].    We comply with applicable federal civil rights laws and [place] laws. We do not discriminate on the basis of race, color, national origin, age, disability, sex, sexual orientation, or gender identity.            Thank you!     [facility] you [facility]  [facility] your [facility]. Our goal is always to provide you with excellent care. Hearing back from our patients is one way we can continue to improve our services. Please take a few minutes to complete the written survey that you may receive in the mail after your visit with us. Thank you!             Your Updated Medication List - Protect others around you: Learn how to safely use, store and throw away your medicines at www.disposemymeds.org.          This list is accurate as of: 10/23/17  2:20 PM.  Always use your most recent med list.                   Brand Name Dispense Instructions for use Diagnosis    levothyroxine 88 MCG tablet    SYNTHROID/LEVOTHROID    30 tablet    TAKE ONE TABLET BY MOUTH ONCE DAILY    Acquired hypothyroidism       Urea 40 % Crea     198 g    Twice daily to areas of thick scaling on hands and feet.  Can cover with glove or sock at bedtime.    Lichen simplex chronicus       venlafaxine 37.5 MG 24 hr capsule    EFFEXOR-XR    30 capsule    TAKE ONE CAPSULE BY MOUTH ONCE DAILY    Episode of recurrent major depressive disorder, unspecified depression episode severity (H)

## 2017-10-24 ASSESSMENT — ANXIETY QUESTIONNAIRES: GAD7 TOTAL SCORE: 0

## 2017-10-27 LAB
COPATH REPORT: NORMAL
PAP: NORMAL

## 2017-10-30 LAB
FINAL DIAGNOSIS: NORMAL
HPV HR 12 DNA CVX QL NAA+PROBE: NEGATIVE
HPV16 DNA SPEC QL NAA+PROBE: NEGATIVE
HPV18 DNA SPEC QL NAA+PROBE: NEGATIVE
SPECIMEN DESCRIPTION: NORMAL

## 2017-11-05 DIAGNOSIS — E03.9 ACQUIRED HYPOTHYROIDISM: ICD-10-CM

## 2017-11-06 DIAGNOSIS — Z00.00 ROUTINE GENERAL MEDICAL EXAMINATION AT A HEALTH CARE FACILITY: ICD-10-CM

## 2017-11-06 LAB
CHOLEST SERPL-MCNC: 261 MG/DL
HDLC SERPL-MCNC: 71 MG/DL
LDLC SERPL CALC-MCNC: 167 MG/DL
NONHDLC SERPL-MCNC: 190 MG/DL
TRIGL SERPL-MCNC: 113 MG/DL

## 2017-11-06 PROCEDURE — 80061 LIPID PANEL: CPT | Performed by: NURSE PRACTITIONER

## 2017-11-06 PROCEDURE — 36415 COLL VENOUS BLD VENIPUNCTURE: CPT | Performed by: NURSE PRACTITIONER

## 2017-11-06 RX ORDER — LEVOTHYROXINE SODIUM 88 UG/1
TABLET ORAL
Qty: 90 TABLET | Refills: 0 | Status: SHIPPED | OUTPATIENT
Start: 2017-11-06 | End: 2018-05-31

## 2018-02-14 DIAGNOSIS — E03.9 ACQUIRED HYPOTHYROIDISM: ICD-10-CM

## 2018-02-15 RX ORDER — LEVOTHYROXINE SODIUM 88 UG/1
TABLET ORAL
Qty: 90 TABLET | Refills: 1 | Status: SHIPPED | OUTPATIENT
Start: 2018-02-15 | End: 2020-02-21

## 2018-02-15 NOTE — TELEPHONE ENCOUNTER
Synthroid  Last Written Prescription Date: 11/6/17  Last Fill Quantity: 90 # of Refills: 0  Last Office Visit: 10/23/17

## 2018-02-20 ENCOUNTER — TRANSFERRED RECORDS (OUTPATIENT)
Dept: HEALTH INFORMATION MANAGEMENT | Facility: HOSPITAL | Age: 63
End: 2018-02-20

## 2018-08-13 DIAGNOSIS — F33.9 EPISODE OF RECURRENT MAJOR DEPRESSIVE DISORDER, UNSPECIFIED DEPRESSION EPISODE SEVERITY (H): ICD-10-CM

## 2018-08-14 RX ORDER — VENLAFAXINE HYDROCHLORIDE 37.5 MG/1
CAPSULE, EXTENDED RELEASE ORAL
Qty: 30 CAPSULE | Refills: 0 | Status: SHIPPED | OUTPATIENT
Start: 2018-08-14 | End: 2018-10-03

## 2018-09-09 DIAGNOSIS — E03.9 ACQUIRED HYPOTHYROIDISM: ICD-10-CM

## 2018-09-10 RX ORDER — LEVOTHYROXINE SODIUM 88 UG/1
TABLET ORAL
Qty: 90 TABLET | Refills: 0 | Status: SHIPPED | OUTPATIENT
Start: 2018-09-10 | End: 2018-11-21
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
## 2018-11-21 ENCOUNTER — OFFICE VISIT (OUTPATIENT)
Dept: FAMILY MEDICINE | Facility: OTHER | Age: 63
End: 2018-11-21
Attending: PHYSICIAN ASSISTANT
Payer: COMMERCIAL

## 2018-11-21 VITALS
WEIGHT: 145 LBS | SYSTOLIC BLOOD PRESSURE: 128 MMHG | DIASTOLIC BLOOD PRESSURE: 76 MMHG | OXYGEN SATURATION: 98 % | TEMPERATURE: 99.3 F | HEART RATE: 75 BPM | HEIGHT: 65 IN | BODY MASS INDEX: 24.16 KG/M2

## 2018-11-21 DIAGNOSIS — E03.9 ACQUIRED HYPOTHYROIDISM: ICD-10-CM

## 2018-11-21 DIAGNOSIS — E55.9 VITAMIN D DEFICIENCY DISEASE: ICD-10-CM

## 2018-11-21 DIAGNOSIS — E78.2 MIXED HYPERLIPIDEMIA: ICD-10-CM

## 2018-11-21 DIAGNOSIS — Z01.419 WELL WOMAN EXAM: Primary | ICD-10-CM

## 2018-11-21 DIAGNOSIS — Z12.31 VISIT FOR SCREENING MAMMOGRAM: ICD-10-CM

## 2018-11-21 PROCEDURE — 99396 PREV VISIT EST AGE 40-64: CPT | Performed by: PHYSICIAN ASSISTANT

## 2018-11-21 ASSESSMENT — PAIN SCALES - GENERAL: PAINLEVEL: NO PAIN (0)

## 2018-11-21 ASSESSMENT — ANXIETY QUESTIONNAIRES
5. BEING SO RESTLESS THAT IT IS HARD TO SIT STILL: NOT AT ALL
2. NOT BEING ABLE TO STOP OR CONTROL WORRYING: NOT AT ALL
7. FEELING AFRAID AS IF SOMETHING AWFUL MIGHT HAPPEN: NOT AT ALL
1. FEELING NERVOUS, ANXIOUS, OR ON EDGE: SEVERAL DAYS
6. BECOMING EASILY ANNOYED OR IRRITABLE: SEVERAL DAYS
GAD7 TOTAL SCORE: 2
3. WORRYING TOO MUCH ABOUT DIFFERENT THINGS: NOT AT ALL

## 2018-11-21 ASSESSMENT — PATIENT HEALTH QUESTIONNAIRE - PHQ9
SUM OF ALL RESPONSES TO PHQ QUESTIONS 1-9: 10
5. POOR APPETITE OR OVEREATING: NOT AT ALL

## 2018-11-21 NOTE — NURSING NOTE
"Chief Complaint   Patient presents with     Physical       Initial /76 (BP Location: Left arm, Patient Position: Sitting, Cuff Size: Adult Regular)  Pulse 75  Temp 99.3  F (37.4  C) (Tympanic)  Ht 5' 5\" (1.651 m)  Wt 145 lb (65.8 kg)  SpO2 98%  BMI 24.13 kg/m2 Estimated body mass index is 24.13 kg/(m^2) as calculated from the following:    Height as of this encounter: 5' 5\" (1.651 m).    Weight as of this encounter: 145 lb (65.8 kg).  Medication Reconciliation: complete    Rosi Porter LPN  "

## 2018-11-21 NOTE — MR AVS SNAPSHOT
After Visit Summary   11/21/2018    Idalia Hoang    MRN: [id number]           Patient Information     Date Of Birth          1955        Visit Information        Provider Department      11/21/2018 4:00 PM Love Noriega PA [facility]        Today's Diagnoses     Well woman exam    -  1    Acquired hypothyroidism        Mixed hyperlipidemia        Visit for screening mammogram        Vitamin D deficiency disease          Care Instructions      Preventive Health Recommendations  Female Ages 50 - 64    Yearly exam: See your health care provider every year in order to  o Review health changes.   o Discuss preventive care.    o Review your medicines if your doctor has prescribed any.      Get a Pap test every three years (unless you have an abnormal result and your provider advises testing more often).    If you get Pap tests with HPV test, you only need to test every 5 years, unless you have an abnormal result.     You do not need a Pap test if your uterus was removed (hysterectomy) and you have not had cancer.    You should be tested each year for STDs (sexually transmitted diseases) if you're at risk.     Have a mammogram every 1 to 2 years.    Have a colonoscopy at age 50, or have a yearly FIT test (stool test). These exams screen for colon cancer.      Have a cholesterol test every 5 years, or more often if advised.    Have a diabetes test (fasting glucose) every three years. If you are at risk for diabetes, you should have this test more often.     If you are at risk for osteoporosis (brittle bone disease), think about having a bone density scan (DEXA).    Shots: Get a flu shot each year. Get a tetanus shot every 10 years.    Nutrition:     Eat at least 5 servings of fruits and vegetables each day.    Eat whole-grain bread, whole-wheat pasta and brown rice instead of white grains and rice.    Get adequate Calcium and Vitamin D.     Lifestyle    Exercise at least 150  minutes a week (30 minutes a day, 5 days a week). This will help you control your weight and prevent disease.    Limit alcohol to one drink per day.    No smoking.     Wear sunscreen to prevent skin cancer.     See your dentist every six months for an exam and cleaning.    See your eye doctor every 1 to 2 years.            Follow-ups after your visit        Follow-up notes from your care team     Return in about 4 weeks (around 12/19/2018).      Future tests that were ordered for you today     Open Future Orders        Priority Expected Expires Ordered    Comprehensive metabolic panel Routine  11/21/2019 11/21/2018    CBC with platelets differential Routine  11/21/2019 11/21/2018    TSH with free T4 reflex Routine  11/21/2019 11/21/2018    Lipid Profile (Chol, Trig, HDL, LDL calc) Routine  11/21/2019 11/21/2018    MA Screening Digital Bilateral Routine  11/21/2019 11/21/2018            Who to contact     If you have questions or need follow up information about today's clinic visit or your schedule please contact [facility] directly at [phone].  Normal or non-critical lab and imaging results will be communicated to you by MyChart, letter or phone within 4 business days after the clinic has received the results. If you do not hear from us within 7 days, please contact the clinic through Last Guidehart or phone. If you have a critical or abnormal lab result, we will notify you by phone as soon as possible.  Submit refill requests through Healthcare MarketMaker or call your pharmacy and they will forward the refill request to us. Please allow 3 business days for your refill to be completed.          Additional Information About Your Visit        Last Guidehart Information     Healthcare MarketMaker gives you secure access to your electronic health record. If you see a primary care provider, you can also send messages to your care team and make appointments. If you have questions, please call your primary care clinic.  If you do not  "have a primary care provider, please call [phone] and they will assist you.        Care EveryWhere ID     This is your Care EveryWhere ID. This could be used by other organizations to access your [place] medical records  [id number]        Your Vitals Were     Pulse Temperature Height Pulse Oximetry BMI (Body Mass Index)       75 99.3  F (37.4  C) (Tympanic) 5' 5\" (1.651 m) 98% 24.13 kg/m2        Blood Pressure from Last 3 Encounters:   11/21/18 128/76   10/23/17 112/78   04/25/16 116/68    Weight from Last 3 Encounters:   11/21/18 145 lb (65.8 kg)   10/23/17 147 lb (66.7 kg)   04/25/16 145 lb (65.8 kg)              We Performed the Following     Vitamin D Deficiency          Today's Medication Changes          These changes are accurate as of 11/21/18  5:09 PM.  If you have any questions, ask your nurse or doctor.               These medicines have changed or have updated prescriptions.        Dose/Directions    levothyroxine 88 MCG tablet   Commonly known as:  SYNTHROID/LEVOTHROID   This may have changed:  Another medication with the same name was removed. Continue taking this medication, and follow the directions you see here.   Used for:  Acquired hypothyroidism   Changed by:  Love Noriega PA        TAKE ONE TABLET BY MOUTH ONCE DAILY   Quantity:  90 tablet   Refills:  1                Primary Care Provider Office Phone # Fax #    MARCOS Nelson [phone]       [address] [place] [address]        Equal Access to Services     [facility] ALEJANDRA AH: Hadii dax ku hadasho Soomaali, waaxda luqadaha, qaybta kaalmada myrandaegyada, michelle arvizu. So [facility] [phone].    ATENCIÓN: Si habla jennie, tiene a lo disposición servicios gratuitos de asistencia lingüística. Llame al [phone].    We comply with applicable federal civil rights laws and [place] laws. We do not discriminate on the basis of race, color, national origin, age, disability, sex, " sexual orientation, or gender identity.            Thank you!     Thank you for choosing [facility]  for your care. Our goal is always to provide you with excellent care. Hearing back from our patients is one way we can continue to improve our services. Please take a few minutes to complete the written survey that you may receive in the mail after your visit with us. Thank you!             Your Updated Medication List - Protect others around you: Learn how to safely use, store and throw away your medicines at www.disposemymeds.org.          This list is accurate as of 11/21/18  5:09 PM.  Always use your most recent med list.                   Brand Name Dispense Instructions for use Diagnosis    levothyroxine 88 MCG tablet    SYNTHROID/LEVOTHROID    90 tablet    TAKE ONE TABLET BY MOUTH ONCE DAILY    Acquired hypothyroidism       Urea 40 % Crea     198 g    Twice daily to areas of thick scaling on hands and feet.  Can cover with glove or sock at bedtime.    Lichen simplex chronicus       venlafaxine 37.5 MG 24 hr capsule    EFFEXOR-XR    90 capsule    TAKE 1 CAPSULE BY MOUTH ONCE DAILY **DUE FOR FOLLOW-UP**    Episode of recurrent major depressive disorder, unspecified depression episode severity (H)

## 2018-11-22 ASSESSMENT — ANXIETY QUESTIONNAIRES: GAD7 TOTAL SCORE: 2

## 2018-11-28 DIAGNOSIS — Z01.419 WELL WOMAN EXAM: ICD-10-CM

## 2018-11-28 DIAGNOSIS — E78.2 MIXED HYPERLIPIDEMIA: ICD-10-CM

## 2018-11-28 DIAGNOSIS — E03.9 ACQUIRED HYPOTHYROIDISM: ICD-10-CM

## 2018-11-28 DIAGNOSIS — E55.9 VITAMIN D DEFICIENCY DISEASE: ICD-10-CM

## 2018-11-28 LAB
ALBUMIN SERPL-MCNC: 3.7 G/DL (ref 3.4–5)
ALP SERPL-CCNC: 63 U/L (ref 40–150)
ALT SERPL W P-5'-P-CCNC: 18 U/L (ref 0–50)
ANION GAP SERPL CALCULATED.3IONS-SCNC: 7 MMOL/L (ref 3–14)
AST SERPL W P-5'-P-CCNC: 17 U/L (ref 0–45)
BASOPHILS # BLD AUTO: 0 10E9/L (ref 0–0.2)
BASOPHILS NFR BLD AUTO: 0.6 %
BILIRUB SERPL-MCNC: 0.5 MG/DL (ref 0.2–1.3)
BUN SERPL-MCNC: 15 MG/DL (ref 7–30)
CALCIUM SERPL-MCNC: 8.5 MG/DL (ref 8.5–10.1)
CHLORIDE SERPL-SCNC: 110 MMOL/L (ref 94–109)
CHOLEST SERPL-MCNC: 241 MG/DL
CO2 SERPL-SCNC: 23 MMOL/L (ref 20–32)
CREAT SERPL-MCNC: 0.67 MG/DL (ref 0.52–1.04)
DIFFERENTIAL METHOD BLD: NORMAL
EOSINOPHIL # BLD AUTO: 0.2 10E9/L (ref 0–0.7)
EOSINOPHIL NFR BLD AUTO: 4.4 %
ERYTHROCYTE [DISTWIDTH] IN BLOOD BY AUTOMATED COUNT: 13.3 % (ref 10–15)
GFR SERPL CREATININE-BSD FRML MDRD: 89 ML/MIN/1.7M2
GLUCOSE SERPL-MCNC: 97 MG/DL (ref 70–99)
HCT VFR BLD AUTO: 38 % (ref 35–47)
HDLC SERPL-MCNC: 63 MG/DL
HGB BLD-MCNC: 13.2 G/DL (ref 11.7–15.7)
IMM GRANULOCYTES # BLD: 0 10E9/L (ref 0–0.4)
IMM GRANULOCYTES NFR BLD: 0.2 %
LDLC SERPL CALC-MCNC: 151 MG/DL
LYMPHOCYTES # BLD AUTO: 1.8 10E9/L (ref 0.8–5.3)
LYMPHOCYTES NFR BLD AUTO: 33.9 %
MCH RBC QN AUTO: 32.5 PG (ref 26.5–33)
MCHC RBC AUTO-ENTMCNC: 34.7 G/DL (ref 31.5–36.5)
MCV RBC AUTO: 94 FL (ref 78–100)
MONOCYTES # BLD AUTO: 0.5 10E9/L (ref 0–1.3)
MONOCYTES NFR BLD AUTO: 8.8 %
NEUTROPHILS # BLD AUTO: 2.7 10E9/L (ref 1.6–8.3)
NEUTROPHILS NFR BLD AUTO: 52.1 %
NONHDLC SERPL-MCNC: 178 MG/DL
NRBC # BLD AUTO: 0 10*3/UL
NRBC BLD AUTO-RTO: 0 /100
PLATELET # BLD AUTO: 219 10E9/L (ref 150–450)
POTASSIUM SERPL-SCNC: 4.1 MMOL/L (ref 3.4–5.3)
PROT SERPL-MCNC: 7.2 G/DL (ref 6.8–8.8)
RBC # BLD AUTO: 4.06 10E12/L (ref 3.8–5.2)
SODIUM SERPL-SCNC: 140 MMOL/L (ref 133–144)
TRIGL SERPL-MCNC: 134 MG/DL
TSH SERPL DL<=0.005 MIU/L-ACNC: 0.55 MU/L (ref 0.4–4)
WBC # BLD AUTO: 5.2 10E9/L (ref 4–11)

## 2018-11-28 PROCEDURE — 84443 ASSAY THYROID STIM HORMONE: CPT | Performed by: PHYSICIAN ASSISTANT

## 2018-11-28 PROCEDURE — 36415 COLL VENOUS BLD VENIPUNCTURE: CPT | Performed by: PHYSICIAN ASSISTANT

## 2018-11-28 PROCEDURE — 85025 COMPLETE CBC W/AUTO DIFF WBC: CPT | Performed by: PHYSICIAN ASSISTANT

## 2018-11-28 PROCEDURE — 80061 LIPID PANEL: CPT | Performed by: PHYSICIAN ASSISTANT

## 2018-11-28 PROCEDURE — 82306 VITAMIN D 25 HYDROXY: CPT | Performed by: PHYSICIAN ASSISTANT

## 2018-11-28 PROCEDURE — 80053 COMPREHEN METABOLIC PANEL: CPT | Performed by: PHYSICIAN ASSISTANT

## 2018-11-29 LAB — DEPRECATED CALCIDIOL+CALCIFEROL SERPL-MC: 60 UG/L (ref 20–75)

## 2018-12-10 ENCOUNTER — HOSPITAL ENCOUNTER (EMERGENCY)
Facility: HOSPITAL | Age: 63
End: 2018-12-10
Payer: COMMERCIAL

## 2018-12-11 ENCOUNTER — ANCILLARY PROCEDURE (OUTPATIENT)
Dept: MAMMOGRAPHY | Facility: OTHER | Age: 63
End: 2018-12-11
Attending: PHYSICIAN ASSISTANT
Payer: COMMERCIAL

## 2018-12-11 DIAGNOSIS — Z12.31 VISIT FOR SCREENING MAMMOGRAM: ICD-10-CM

## 2018-12-11 PROCEDURE — 77067 SCR MAMMO BI INCL CAD: CPT | Mod: TC

## 2018-12-11 PROCEDURE — 77063 BREAST TOMOSYNTHESIS BI: CPT | Mod: TC

## 2018-12-22 DIAGNOSIS — E03.9 ACQUIRED HYPOTHYROIDISM: ICD-10-CM

## 2018-12-26 RX ORDER — LEVOTHYROXINE SODIUM 88 UG/1
TABLET ORAL
Qty: 90 TABLET | Refills: 1 | Status: SHIPPED | OUTPATIENT
Start: 2018-12-26 | End: 2019-07-18

## 2019-01-12 DIAGNOSIS — F33.9 EPISODE OF RECURRENT MAJOR DEPRESSIVE DISORDER, UNSPECIFIED DEPRESSION EPISODE SEVERITY (H): ICD-10-CM

## 2019-01-14 ENCOUNTER — TELEPHONE (OUTPATIENT)
Dept: FAMILY MEDICINE | Facility: OTHER | Age: 64
End: 2019-01-14

## 2019-01-14 DIAGNOSIS — F33.9 EPISODE OF RECURRENT MAJOR DEPRESSIVE DISORDER, UNSPECIFIED DEPRESSION EPISODE SEVERITY (H): ICD-10-CM

## 2019-01-14 RX ORDER — VENLAFAXINE HYDROCHLORIDE 37.5 MG/1
37.5 CAPSULE, EXTENDED RELEASE ORAL DAILY
Qty: 90 CAPSULE | Refills: 1 | Status: SHIPPED | OUTPATIENT
Start: 2019-01-14 | End: 2019-01-14

## 2019-01-14 NOTE — TELEPHONE ENCOUNTER
Effexor - patient is wanting to increase Effexor.  She states she spoke with PCP about this.  Patient is currently taking 37.5 mg daily.  Please advise.       Phone: [phone]    PHQ-9 score:    PHQ-9 SCORE 11/21/2018   PHQ-9 Total Score -   PHQ-9 Total Score 10

## 2019-01-15 RX ORDER — VENLAFAXINE HYDROCHLORIDE 75 MG/1
75 CAPSULE, EXTENDED RELEASE ORAL DAILY
Qty: 90 CAPSULE | Refills: 1 | Status: SHIPPED | OUTPATIENT
Start: 2019-01-15 | End: 2019-03-11

## 2019-01-15 NOTE — TELEPHONE ENCOUNTER
Patient notified of new rx via telephone.     Will let us know how it is working within 1-2 weeks.    Pat Rodriguez LPN on 1/15/2019 at 3:07 PM

## 2019-03-08 DIAGNOSIS — F33.9 EPISODE OF RECURRENT MAJOR DEPRESSIVE DISORDER, UNSPECIFIED DEPRESSION EPISODE SEVERITY (H): ICD-10-CM

## 2019-03-11 RX ORDER — VENLAFAXINE HYDROCHLORIDE 75 MG/1
75 CAPSULE, EXTENDED RELEASE ORAL DAILY
Qty: 90 CAPSULE | Refills: 0 | Status: SHIPPED | OUTPATIENT
Start: 2019-03-11 | End: 2019-07-18

## 2019-06-18 DIAGNOSIS — L28.0 LICHEN SIMPLEX CHRONICUS: ICD-10-CM

## 2019-06-19 RX ORDER — UREA 40 %
CREAM (GRAM) TOPICAL
Qty: 199 G | Refills: 3 | Status: SHIPPED | OUTPATIENT
Start: 2019-06-19 | End: 2019-11-22

## 2019-06-19 NOTE — TELEPHONE ENCOUNTER
Urea 40 % CREA      Last Written Prescription Date:  10-23-17  Last Fill Quantity: 198g,   # refills: 3  Last Office Visit: 11-21-18  Future Office visit:    Next 5 appointments (look out 90 days)    Jul 01, 2019 11:20 AM CDT  (Arrive by 11:05 AM)  [facility] [address]  [place] [address]  [phone]           Routing refill request to provider for review/approval because:  Drug not on the [facility], [facility] or [facility] refill protocol or controlled substance

## 2019-07-01 ENCOUNTER — OFFICE VISIT (OUTPATIENT)
Dept: FAMILY MEDICINE | Facility: OTHER | Age: 64
End: 2019-07-01
Attending: PHYSICIAN ASSISTANT
Payer: COMMERCIAL

## 2019-07-01 ENCOUNTER — HOSPITAL ENCOUNTER (OUTPATIENT)
Dept: ULTRASOUND IMAGING | Facility: HOSPITAL | Age: 64
Discharge: HOME | End: 2019-07-01
Attending: PHYSICIAN ASSISTANT | Admitting: PHYSICIAN ASSISTANT
Payer: COMMERCIAL

## 2019-07-01 VITALS
OXYGEN SATURATION: 99 % | DIASTOLIC BLOOD PRESSURE: 72 MMHG | WEIGHT: 152 LBS | HEART RATE: 73 BPM | SYSTOLIC BLOOD PRESSURE: 104 MMHG | BODY MASS INDEX: 25.29 KG/M2

## 2019-07-01 DIAGNOSIS — N63.20 LEFT BREAST MASS: Primary | ICD-10-CM

## 2019-07-01 DIAGNOSIS — N63.20 LEFT BREAST MASS: ICD-10-CM

## 2019-07-01 PROCEDURE — 76642 ULTRASOUND BREAST LIMITED: CPT | Mod: TC,LT

## 2019-07-01 PROCEDURE — G0463 HOSPITAL OUTPT CLINIC VISIT: HCPCS

## 2019-07-01 PROCEDURE — 99213 OFFICE O/P EST LOW 20 MIN: CPT | Performed by: PHYSICIAN ASSISTANT

## 2019-07-01 ASSESSMENT — PAIN SCALES - GENERAL: PAINLEVEL: NO PAIN (0)

## 2019-07-01 NOTE — NURSING NOTE
"Chief Complaint   Patient presents with     Breast Exam       Initial /72 (Patient Position: Sitting)   Pulse 73   Wt 68.9 kg (152 lb)   SpO2 99%   BMI 25.29 kg/m   Estimated body mass index is 25.29 kg/m  as calculated from the following:    Height as of 11/21/18: 1.651 m (5' 5\").    Weight as of this encounter: 68.9 kg (152 lb).  Medication Reconciliation: complete  "

## 2019-07-01 NOTE — PATIENT INSTRUCTIONS
Thank you for choosing [facility].   I have office hours 8:00 am to 4:30 pm on Monday's, Wednesday's, Thursday's and Friday's. My nurse and I are out of the office every Tuesday.    Following your visit, when your labs and diagnostic testing have returned, I will review then and you will be contacted by my nurse.  If you are on My Chart, you can also view results there.    For refills, notify your pharmacy regarding what you need and the pharmacy will generate a refill request. Do not call my nurse as she is unable to process refill request. Please plan ahead and allow 3-5 days for refill requests.    You will generally receive a reminder call the day prior to your appointment.  If you cannot attend your appointment, please cancel your appointment with as much notice as possible.  If there is a pattern of failure to present for your appointments, I cannot provide consistent, meaningful, ongoing care for you. It is very important to me that you come in for your care, so we can best assist you with your health care needs.    IMPORTANT:  Please note that it is my standard of practice to NOT participate in prescribing ongoing requested Narcotic Analgesic therapy, and/or participate in the prescribing of other controlled substances.  My nurse and I am happy to assist you with the process of referral for alternative pain management as needed, and other treatment modalities including but not limited to:  Physical Therapy, Physical Medicine and Rehab, Counseling, Chiropractic Care, Orthopedic Care, and non-narcotic medication management.     In the event that you need to be seen for emergent concerns and I am out of office,  please see one of my colleagues for acute concerns.  You may also present to  or ER.  I appreciate the opportunity to serve you and look forward to supporting your healthcare needs in the future. Please contact me with any questions or concerns that you may  have.    Sincerely,      Love Noriega RN, PA-C

## 2019-07-18 ENCOUNTER — TELEPHONE (OUTPATIENT)
Dept: FAMILY MEDICINE | Facility: OTHER | Age: 64
End: 2019-07-18

## 2019-07-18 DIAGNOSIS — E03.9 ACQUIRED HYPOTHYROIDISM: ICD-10-CM

## 2019-07-18 DIAGNOSIS — F33.9 EPISODE OF RECURRENT MAJOR DEPRESSIVE DISORDER, UNSPECIFIED DEPRESSION EPISODE SEVERITY (H): ICD-10-CM

## 2019-07-18 RX ORDER — VENLAFAXINE HYDROCHLORIDE 75 MG/1
75 CAPSULE, EXTENDED RELEASE ORAL DAILY
Qty: 90 CAPSULE | Refills: 0 | Status: SHIPPED | OUTPATIENT
Start: 2019-07-18 | End: 2019-11-22 | Stop reason: ALTCHOICE

## 2019-07-18 RX ORDER — LEVOTHYROXINE SODIUM 88 UG/1
88 TABLET ORAL DAILY
Qty: 90 TABLET | Refills: 0 | Status: SHIPPED | OUTPATIENT
Start: 2019-07-18 | End: 2019-11-08

## 2019-07-18 RX ORDER — LEVOTHYROXINE SODIUM 88 UG/1
TABLET ORAL
Qty: 90 TABLET | Refills: 1 | OUTPATIENT
Start: 2019-07-18

## 2019-11-08 DIAGNOSIS — E03.9 ACQUIRED HYPOTHYROIDISM: ICD-10-CM

## 2019-11-11 RX ORDER — LEVOTHYROXINE SODIUM 88 UG/1
TABLET ORAL
Qty: 90 TABLET | Refills: 0 | Status: SHIPPED | OUTPATIENT
Start: 2019-11-11 | End: 2019-11-22

## 2019-11-22 ENCOUNTER — OFFICE VISIT (OUTPATIENT)
Dept: FAMILY MEDICINE | Facility: OTHER | Age: 64
End: 2019-11-22
Attending: PHYSICIAN ASSISTANT
Payer: COMMERCIAL

## 2019-11-22 VITALS
WEIGHT: 156 LBS | SYSTOLIC BLOOD PRESSURE: 120 MMHG | BODY MASS INDEX: 26.63 KG/M2 | TEMPERATURE: 98.6 F | OXYGEN SATURATION: 99 % | HEART RATE: 78 BPM | HEIGHT: 64 IN | DIASTOLIC BLOOD PRESSURE: 82 MMHG

## 2019-11-22 DIAGNOSIS — Z00.00 ROUTINE GENERAL MEDICAL EXAMINATION AT A HEALTH CARE FACILITY: Primary | ICD-10-CM

## 2019-11-22 DIAGNOSIS — L28.0 LICHEN SIMPLEX CHRONICUS: ICD-10-CM

## 2019-11-22 DIAGNOSIS — F33.41 MAJOR DEPRESSIVE DISORDER, RECURRENT EPISODE, IN PARTIAL REMISSION (H): ICD-10-CM

## 2019-11-22 DIAGNOSIS — Z23 NEED FOR PROPHYLACTIC VACCINATION AND INOCULATION AGAINST INFLUENZA: ICD-10-CM

## 2019-11-22 DIAGNOSIS — R10.9 STOMACH PAIN: ICD-10-CM

## 2019-11-22 PROCEDURE — 90682 RIV4 VACC RECOMBINANT DNA IM: CPT

## 2019-11-22 PROCEDURE — 90471 IMMUNIZATION ADMIN: CPT | Performed by: PHYSICIAN ASSISTANT

## 2019-11-22 PROCEDURE — 99396 PREV VISIT EST AGE 40-64: CPT | Performed by: PHYSICIAN ASSISTANT

## 2019-11-22 RX ORDER — UREA 40 %
CREAM (GRAM) TOPICAL
Qty: 199 G | Refills: 3 | Status: SHIPPED | OUTPATIENT
Start: 2019-11-22 | End: 2022-08-16

## 2019-11-22 RX ORDER — FLUOCINONIDE 0.5 MG/G
CREAM TOPICAL 2 TIMES DAILY
Qty: 60 G | Refills: 1 | Status: SHIPPED | OUTPATIENT
Start: 2019-11-22 | End: 2022-08-16

## 2019-11-22 RX ORDER — BUPROPION HYDROCHLORIDE 300 MG/1
300 TABLET ORAL EVERY MORNING
Qty: 30 TABLET | Refills: 1 | Status: SHIPPED | OUTPATIENT
Start: 2019-11-22 | End: 2020-04-24

## 2019-11-22 RX ORDER — VENLAFAXINE HYDROCHLORIDE 37.5 MG/1
37.5 CAPSULE, EXTENDED RELEASE ORAL DAILY
Qty: 30 CAPSULE | Refills: 1 | Status: SHIPPED | OUTPATIENT
Start: 2019-11-22 | End: 2021-07-07 | Stop reason: ALTCHOICE

## 2019-11-22 ASSESSMENT — PAIN SCALES - GENERAL: PAINLEVEL: MODERATE PAIN (5)

## 2019-11-22 ASSESSMENT — MIFFLIN-ST. JEOR: SCORE: 1252.61

## 2019-11-22 NOTE — NURSING NOTE
"Chief Complaint   Patient presents with     Physical       Initial /82   Pulse 78   Temp 98.6  F (37  C)   Ht 1.626 m (5' 4\")   Wt 70.8 kg (156 lb)   SpO2 99%   BMI 26.78 kg/m   Estimated body mass index is 26.78 kg/m  as calculated from the following:    Height as of this encounter: 1.626 m (5' 4\").    Weight as of this encounter: 70.8 kg (156 lb).  Medication Reconciliation: complete  Jessa Behrman, LPN  "

## 2019-11-22 NOTE — LETTER
My Depression Action Plan  Name: Idalia Hoang   Date of Birth 1955  Date: 11/22/2019    My doctor: Love Noriega   My clinic: [facility] [place]  [address]  [place] [address]  [phone]          GREEN    ZONE   Good Control    What it looks like:     Things are going generally well. You have normal up s and down s. You may even feel depressed from time to time, but bad moods usually last less than a day.   What you need to do:  1. Continue to care for yourself (see self care plan)  2. Check your depression survival kit and update it as needed  3. Follow your physician s recommendations including any medication.  4. Do not stop taking medication unless you consult with your physician first.           YELLOW         ZONE Getting Worse    What it looks like:     Depression is starting to interfere with your life.     It may be hard to get out of bed; you may be starting to isolate yourself from others.    Symptoms of depression are starting to last most all day and this has happened for several days.     You may have suicidal thoughts but they are not constant.   What you need to do:     1. Call your care team, your response to treatment will improve if you keep your care team informed of your progress. Yellow periods are signs an adjustment may need to be made.     2. Continue your self-care, even if you have to fake it!    3. Talk to someone in your support network    4. Open up your depression survival kit           RED    ZONE Medical Alert - Get Help    What it looks like:     Depression is seriously interfering with your life.     You may experience these or other symptoms: You can t get out of bed most days, can t work or engage in other necessary activities, you have trouble taking care of basic hygiene, or basic responsibilities, thoughts of suicide or death that will not go away, self-injurious behavior.     What you need to do:  1. Call your care team and  request a same-day appointment. If they are not available (weekends or after hours) call your local crisis line, emergency room or 911.            Depression Self Care Plan / Survival Kit    Self-Care for Depression  Here s the deal. Your body and mind are really not as separate as most people think.  What you do and think affects how you feel and how you feel influences what you do and think. This means if you do things that people who feel good do, it will help you feel better.  Sometimes this is all it takes.  There is also a place for medication and therapy depending on how severe your depression is, so be sure to consult with your medical provider and/ or Behavioral Health Consultant if your symptoms are worsening or not improving.     In order to better manage my stress, I will:    Exercise  Get some form of exercise, every day. This will help reduce pain and release endorphins, the  feel good  chemicals in your brain. This is almost as good as taking antidepressants!  This is not the same as joining a gym and then never going! (they count on that by the way ) It can be as simple as just going for a walk or doing some gardening, anything that will get you moving.      Hygiene   Maintain good hygiene (Get out of bed in the morning, Make your bed, Brush your teeth, Take a shower, and Get dressed like you were going to work, even if you are unemployed).  If your clothes don't fit try to get ones that do.    Diet  I will strive to eat foods that are good for me, drink plenty of water, and avoid excessive sugar, caffeine, alcohol, and other mood-altering substances.  Some foods that are helpful in depression are: complex carbohydrates, B vitamins, flaxseed, fish or fish oil, fresh fruits and vegetables.    Psychotherapy  I agree to participate in Individual Therapy (if recommended).    Medication  If prescribed medications, I agree to take them.  Missing doses can result in serious side effects.  I understand that  drinking alcohol, or other illicit drug use, may cause potential side effects.  I will not stop my medication abruptly without first discussing it with my provider.    Staying Connected With Others  I will stay in touch with my friends, family members, and my primary care provider/team.    Use your imagination  Be creative.  We all have a creative side; it doesn t matter if it s oil painting, sand castles, or mud pies! This will also kick up the endorphins.    Witness Beauty  (AKA stop and smell the roses) Take a look outside, even in mid-winter. Notice colors, textures. Watch the squirrels and birds.     Service to others  Be of service to others.  There is always someone else in need.  By helping others we can  get out of ourselves  and remember the really important things.  This also provides opportunities for practicing all the other parts of the program.    Humor  Laugh and be silly!  Adjust your TV habits for less news and crime-drama and more comedy.    Control your stress  Try breathing deep, massage therapy, biofeedback, and meditation. Find time to relax each day.     My support system    Clinic Contact:  Phone number:    Contact 1:  Phone number:    Contact 2:  Phone number:    Religion/:  Phone number:    Therapist:  Phone number:    Local crisis center:    Phone number:    Other community support:  Phone number:

## 2019-11-22 NOTE — PROGRESS NOTES
SUBJECTIVE:   CC: Idalia Hoang is an 63 year old woman who presents for preventive health visit.     Healthy Habits:    Do you get at least three servings of calcium containing foods daily (dairy, green leafy vegetables, etc.)? yes     Amount of exercise or daily activities, outside of work: no    Problems taking medications regularly No    Medication side effects: Yes heartburn    Have you had an eye exam in the past two years? yes    Do you see a dentist twice per year? yes    Do you have sleep apnea, excessive snoring or daytime drowsiness?no  Already added colon screening.             Today's PHQ-2 Score: No flowsheet data found.    Abuse: Current or Past(Physical, Sexual or Emotional)- No  Do you feel safe in your environment? No        Social History     Tobacco Use     Smoking status: Former Smoker     Packs/day: 0.30     Years: 1.00     Pack years: 0.30     Types: Cigarettes     Smokeless tobacco: Never Used     Tobacco comment: quit 1989; no passive exposure   Substance Use Topics     Alcohol use: Yes     Comment: 1 drink beer and wine daily     If you drink alcohol do you typically have >3 drinks per day or >7 drinks per week? Not Applicable                     Reviewed orders with patient.  Reviewed health maintenance and updated orders accordingly - Yes  Lab work is in process  Labs reviewed in EPIC  BP Readings from Last 3 Encounters:   11/22/19 120/82   07/01/19 104/72   11/21/18 128/76    Wt Readings from Last 3 Encounters:   11/22/19 70.8 kg (156 lb)   07/01/19 68.9 kg (152 lb)   11/21/18 65.8 kg (145 lb)                  Patient Active Problem List   Diagnosis     Advanced care planning/counseling discussion     Lichen simplex chronicus     Acquired hypothyroidism     Estradiol deficiency     Mixed hyperlipidemia     ACP (advance care planning)     Family history of melanoma     Past Surgical History:   Procedure Laterality Date     COLONOSCOPY  04/20/2012     surgical removal  2010    tendon  nodule; free of disease       Social History     Tobacco Use     Smoking status: Former Smoker     Packs/day: 0.30     Years: 1.00     Pack years: 0.30     Types: Cigarettes     Smokeless tobacco: Never Used     Tobacco comment: quit 1989; no passive exposure   Substance Use Topics     Alcohol use: Yes     Comment: 1 drink beer and wine daily     Family History   Problem Relation Age of Onset     Hypertension Sister      Melanoma Sister      C.A.D. Other         grandmother     Cancer Other         lung; grandmother/leukemia; grandfather     Depression Other         family h/o     Hypertension Other         grandmother     Breast Cancer Sister      Other - See Comments Mother         memory loss/sjogrensyndrome/stomach     Osteoporosis Mother      Alzheimer Disease Mother      Hyperlipidemia Sister      Diabetes Father      Coronary Artery Disease Father         a fib     Thyroid Disease Sister      Asthma Brother          Current Outpatient Medications   Medication Sig Dispense Refill     buPROPion (WELLBUTRIN XL) 300 MG 24 hr tablet Take 1 tablet (300 mg) by mouth every morning 30 tablet 1     levothyroxine (SYNTHROID/LEVOTHROID) 88 MCG tablet TAKE ONE TABLET BY MOUTH ONCE DAILY 90 tablet 1     ranitidine (ZANTAC) 300 MG tablet Take 0.5 tablets (150 mg) by mouth At Bedtime 90 tablet 3     Urea 40 % CREA APPLY  CREAM EXTERNALLY TO AFFECTED AREA TWICE DAILY ON  THICK  SCALING  ON  HAND  AND  FEET--CAN  COVER  WITH  GLOVE  OR  SOCK  AT  BEDTIM 199 g 3     venlafaxine (EFFEXOR-XR) 37.5 MG 24 hr capsule Take 1 capsule (37.5 mg) by mouth daily 30 capsule 1     No Known Allergies  Recent Labs   Lab Test 11/28/18  0740 11/06/17  1035 10/23/17  1425 07/18/16  0907 06/22/15  0824   * 167*  --  152* 194*   HDL 63 71  --  65 59   TRIG 134 113  --  167* 134   ALT 18  --   --  22 30   CR 0.67  --   --  0.78 0.84   GFRESTIMATED 89  --   --  75 70   GFRESTBLACK >90  --   --  >90   GFR Calc   84   POTASSIUM  4.1  --   --  4.3 4.1   TSH 0.55  --  1.40 0.84 0.68        Mammogram Screening: Patient over age 50, mutual decision to screen reflected in health maintenance.    Pertinent mammograms are reviewed under the imaging tab.  History of abnormal Pap smear:   Last 3 Pap and HPV Results:   PAP / HPV Latest Ref Rng & Units 10/23/2017 4/14/2014   PAP - NIL NIL   HPV 16 DNA NEG:Negative Negative -   HPV 18 DNA NEG:Negative Negative -   OTHER HR HPV NEG:Negative Negative -     PAP / HPV Latest Ref Rng & Units 10/23/2017 4/14/2014   PAP - NIL NIL   HPV 16 DNA NEG:Negative Negative -   HPV 18 DNA NEG:Negative Negative -   OTHER HR HPV NEG:Negative Negative -     Reviewed and updated as needed this visit by clinical staff         Reviewed and updated as needed this visit by Provider          Past Medical History:   Diagnosis Date     Acquired hypothyroidism 4/25/2016     Chronic serous otitis media, simple or unspecified 06/18/2012     Depression, recurrent 01/01/2011     Dysfunction of eustachian tube 06/18/2012     Estradiol deficiency 4/25/2016     Hypothyroidism 01/01/2011     Mixed hyperlipidemia 4/25/2016     Osteopenia       Past Surgical History:   Procedure Laterality Date     COLONOSCOPY  04/20/2012     surgical removal  2010    tendon nodule; free of disease     OB History   No obstetric history on file.       ROS:  CONSTITUTIONAL: NEGATIVE for fever, chills, change in weight  INTEGUMENTARU/SKIN: NEGATIVE for worrisome rashes, moles or lesions  EYES: NEGATIVE for vision changes or irritation  ENT: NEGATIVE for ear, mouth and throat problems  RESP: NEGATIVE for significant cough or SOB  BREAST: NEGATIVE for masses, tenderness or discharge  CV: NEGATIVE for chest pain, palpitations or peripheral edema  GI: NEGATIVE for nausea, abdominal pain, heartburn, or change in bowel habits  : NEGATIVE for unusual urinary or vaginal symptoms. Periods are regular.  MUSCULOSKELETAL: NEGATIVE for significant arthralgias or  myalgia  NEURO: NEGATIVE for weakness, dizziness or paresthesias  ENDOCRINE: NEGATIVE for temperature intolerance, skin/hair changes  HEME/ALLERGY/IMMUNE: NEGATIVE for bleeding problems  PSYCHIATRIC: NEGATIVE for changes in mood or affect    OBJECTIVE:   There were no vitals taken for this visit.  EXAM:  GENERAL: healthy, alert and no distress  EYES: Eyes grossly normal to inspection, PERRL and conjunctivae and sclerae normal  HENT: ear canals and TM's normal, nose and mouth without ulcers or lesions  NECK: no adenopathy, no asymmetry, masses, or scars and thyroid normal to palpation  RESP: lungs clear to auscultation - no rales, rhonchi or wheezes  BREAST: normal without masses, tenderness or nipple discharge and no palpable axillary masses or adenopathy  CV: regular rate and rhythm, normal S1 S2, no S3 or S4, no murmur, click or rub, no peripheral edema and peripheral pulses strong  ABDOMEN: soft, nontender, no hepatosplenomegaly, no masses and bowel sounds normal  MS: no gross musculoskeletal defects noted, no edema  SKIN: no suspicious lesions or rashes  NEURO: Normal strength and tone, mentation intact and speech normal  PSYCH: mentation appears normal, affect normal/bright  LYMPH: no cervical, supraclavicular, axillary, or inguinal adenopathy    Diagnostic Test Results:  Labs reviewed in Epic  No results found for this or any previous visit (from the past 24 hour(s)).    ASSESSMENT/PLAN:   1. Routine general medical examination at a health care facility  She is not due for her pap smear and mammogram is ordered. She is doing fairly well. Medication review and refills done. immunizations are up to date.   - TSH; Future  - Comprehensive metabolic panel; Future  - Lipid Profile; Future  - CBC with platelets; Future  - UA with Microscopic reflex to Culture; Future  - Vitamin D Deficiency; Future  - venlafaxine (EFFEXOR-XR) 37.5 MG 24 hr capsule; Take 1 capsule (37.5 mg) by mouth daily  Dispense: 30 capsule;  "Refill: 1  - buPROPion (WELLBUTRIN XL) 300 MG 24 hr tablet; Take 1 tablet (300 mg) by mouth every morning  Dispense: 30 tablet; Refill: 1    2. Major depressive disorder, recurrent episode, in partial remission (H)  Mood is better. Has done some lifestyle changes which may have been of benefit and job changes.  Recheck again in 3 months.   - TSH; Future  - venlafaxine (EFFEXOR-XR) 37.5 MG 24 hr capsule; Take 1 capsule (37.5 mg) by mouth daily  Dispense: 30 capsule; Refill: 1  - buPROPion (WELLBUTRIN XL) 300 MG 24 hr tablet; Take 1 tablet (300 mg) by mouth every morning  Dispense: 30 tablet; Refill: 1    3. Stomach pain  Chronic and worsening. Considered checking gallbladder. But ok. Trying below and check for risk for ulcer. Pt agrees to follow up in 6 weeks if not better.   - Helicobacter pylori Antigen Stool; Future  - ranitidine (ZANTAC) 300 MG tablet; Take 0.5 tablets (150 mg) by mouth At Bedtime  Dispense: 90 tablet; Refill: 3    4. Lichen simplex chronicus  Dx on bx. She needing refill and will be given one. See us back as recommended.    - Urea 40 % CREA; APPLY  CREAM EXTERNALLY TO AFFECTED AREA TWICE DAILY ON  THICK  SCALING  ON  HAND  AND  FEET--CAN  COVER  WITH  GLOVE  OR  SOCK  AT  BEDTIM  Dispense: 199 g; Refill: 3  - fluocinonide (LIDEX) 0.05 % external cream; Apply topically 2 times daily  Dispense: 60 g; Refill: 1    COUNSELING:   Reviewed preventive health counseling, as reflected in patient instructions       Regular exercise       Healthy diet/nutrition       Vision screening       Advance Care Planning    Estimated body mass index is 25.29 kg/m  as calculated from the following:    Height as of 11/21/18: 1.651 m (5' 5\").    Weight as of 7/1/19: 68.9 kg (152 lb).         reports that she has quit smoking. Her smoking use included cigarettes. She has a 0.30 pack-year smoking history. She has never used smokeless tobacco.      Counseling Resources:  ATP IV Guidelines  Pooled Cohorts Equation " Calculator  Breast Cancer Risk Calculator  FRAX Risk Assessment  ICSI Preventive Guidelines  Dietary Guidelines for Americans, 2010  USDA's MyPlate  ASA Prophylaxis  Lung CA Screening    MARCOS John  [facility]

## 2019-11-25 ASSESSMENT — ANXIETY QUESTIONNAIRES
3. WORRYING TOO MUCH ABOUT DIFFERENT THINGS: NOT AT ALL
1. FEELING NERVOUS, ANXIOUS, OR ON EDGE: NOT AT ALL
GAD7 TOTAL SCORE: 0
4. TROUBLE RELAXING: NOT AT ALL
5. BEING SO RESTLESS THAT IT IS HARD TO SIT STILL: NOT AT ALL
2. NOT BEING ABLE TO STOP OR CONTROL WORRYING: NOT AT ALL
6. BECOMING EASILY ANNOYED OR IRRITABLE: NOT AT ALL
7. FEELING AFRAID AS IF SOMETHING AWFUL MIGHT HAPPEN: NOT AT ALL

## 2019-11-25 ASSESSMENT — PATIENT HEALTH QUESTIONNAIRE - PHQ9: SUM OF ALL RESPONSES TO PHQ QUESTIONS 1-9: 9

## 2019-11-26 ASSESSMENT — ANXIETY QUESTIONNAIRES: GAD7 TOTAL SCORE: 0

## 2019-12-10 ENCOUNTER — TRANSFERRED RECORDS (OUTPATIENT)
Dept: HEALTH INFORMATION MANAGEMENT | Facility: CLINIC | Age: 64
End: 2019-12-10

## 2019-12-10 DIAGNOSIS — F33.41 MAJOR DEPRESSIVE DISORDER, RECURRENT EPISODE, IN PARTIAL REMISSION (H): ICD-10-CM

## 2019-12-10 DIAGNOSIS — R10.9 STOMACH PAIN: ICD-10-CM

## 2019-12-10 DIAGNOSIS — Z00.00 ROUTINE GENERAL MEDICAL EXAMINATION AT A HEALTH CARE FACILITY: ICD-10-CM

## 2019-12-10 LAB
ALBUMIN SERPL-MCNC: 4.1 G/DL (ref 3.4–5)
ALBUMIN UR-MCNC: 10 MG/DL
ALP SERPL-CCNC: 71 U/L (ref 40–150)
ALT SERPL W P-5'-P-CCNC: 26 U/L (ref 0–50)
ANION GAP SERPL CALCULATED.3IONS-SCNC: 4 MMOL/L (ref 3–14)
APPEARANCE UR: CLEAR
AST SERPL W P-5'-P-CCNC: 16 U/L (ref 0–45)
BACTERIA #/AREA URNS HPF: ABNORMAL /HPF
BILIRUB SERPL-MCNC: 0.4 MG/DL (ref 0.2–1.3)
BILIRUB UR QL STRIP: NEGATIVE
BUN SERPL-MCNC: 13 MG/DL (ref 7–30)
CALCIUM SERPL-MCNC: 9.1 MG/DL (ref 8.5–10.1)
CHLORIDE SERPL-SCNC: 108 MMOL/L (ref 94–109)
CHOLEST SERPL-MCNC: 243 MG/DL
CO2 SERPL-SCNC: 27 MMOL/L (ref 20–32)
COLOGUARD-ABSTRACT: NEGATIVE
COLOR UR AUTO: YELLOW
CREAT SERPL-MCNC: 0.87 MG/DL (ref 0.52–1.04)
ERYTHROCYTE [DISTWIDTH] IN BLOOD BY AUTOMATED COUNT: 13.3 % (ref 10–15)
GFR SERPL CREATININE-BSD FRML MDRD: 71 ML/MIN/{1.73_M2}
GLUCOSE SERPL-MCNC: 93 MG/DL (ref 70–99)
GLUCOSE UR STRIP-MCNC: NEGATIVE MG/DL
HCT VFR BLD AUTO: 38.9 % (ref 35–47)
HDLC SERPL-MCNC: 64 MG/DL
HGB BLD-MCNC: 13.3 G/DL (ref 11.7–15.7)
HGB UR QL STRIP: ABNORMAL
KETONES UR STRIP-MCNC: NEGATIVE MG/DL
LDLC SERPL CALC-MCNC: 149 MG/DL
LEUKOCYTE ESTERASE UR QL STRIP: ABNORMAL
MCH RBC QN AUTO: 32.4 PG (ref 26.5–33)
MCHC RBC AUTO-ENTMCNC: 34.2 G/DL (ref 31.5–36.5)
MCV RBC AUTO: 95 FL (ref 78–100)
MUCOUS THREADS #/AREA URNS LPF: PRESENT /LPF
NITRATE UR QL: NEGATIVE
NONHDLC SERPL-MCNC: 179 MG/DL
PH UR STRIP: 6 PH (ref 4.7–8)
PLATELET # BLD AUTO: 225 10E9/L (ref 150–450)
POTASSIUM SERPL-SCNC: 4.4 MMOL/L (ref 3.4–5.3)
PROT SERPL-MCNC: 7.6 G/DL (ref 6.8–8.8)
RBC # BLD AUTO: 4.1 10E12/L (ref 3.8–5.2)
RBC #/AREA URNS AUTO: 2 /HPF (ref 0–2)
SODIUM SERPL-SCNC: 139 MMOL/L (ref 133–144)
SOURCE: ABNORMAL
SP GR UR STRIP: 1.03 (ref 1–1.03)
SQUAMOUS #/AREA URNS AUTO: <1 /HPF (ref 0–1)
TRIGL SERPL-MCNC: 148 MG/DL
TSH SERPL DL<=0.005 MIU/L-ACNC: 1.44 MU/L (ref 0.4–4)
UROBILINOGEN UR STRIP-MCNC: NORMAL MG/DL (ref 0–2)
WBC # BLD AUTO: 6.1 10E9/L (ref 4–11)
WBC #/AREA URNS AUTO: 1 /HPF (ref 0–5)

## 2019-12-10 PROCEDURE — 81001 URINALYSIS AUTO W/SCOPE: CPT | Mod: ZL | Performed by: PHYSICIAN ASSISTANT

## 2019-12-10 PROCEDURE — 36415 COLL VENOUS BLD VENIPUNCTURE: CPT | Mod: ZL | Performed by: PHYSICIAN ASSISTANT

## 2019-12-10 PROCEDURE — 84443 ASSAY THYROID STIM HORMONE: CPT | Mod: ZL | Performed by: PHYSICIAN ASSISTANT

## 2019-12-10 PROCEDURE — 80061 LIPID PANEL: CPT | Mod: ZL,59 | Performed by: PHYSICIAN ASSISTANT

## 2019-12-10 PROCEDURE — 85027 COMPLETE CBC AUTOMATED: CPT | Mod: ZL,59 | Performed by: PHYSICIAN ASSISTANT

## 2019-12-10 PROCEDURE — 80053 COMPREHEN METABOLIC PANEL: CPT | Mod: ZL,59 | Performed by: PHYSICIAN ASSISTANT

## 2019-12-10 PROCEDURE — 82306 VITAMIN D 25 HYDROXY: CPT | Mod: ZL,59 | Performed by: PHYSICIAN ASSISTANT

## 2019-12-12 LAB — DEPRECATED CALCIDIOL+CALCIFEROL SERPL-MC: 53 UG/L (ref 20–75)

## 2019-12-14 DIAGNOSIS — R10.9 STOMACH PAIN: ICD-10-CM

## 2019-12-14 PROCEDURE — 87338 HPYLORI STOOL AG IA: CPT | Mod: 59 | Performed by: PHYSICIAN ASSISTANT

## 2019-12-17 LAB — H PYLORI AG STL QL IA: NEGATIVE

## 2019-12-20 ENCOUNTER — TELEPHONE (OUTPATIENT)
Dept: FAMILY MEDICINE | Facility: OTHER | Age: 64
End: 2019-12-20

## 2019-12-23 ENCOUNTER — ANCILLARY PROCEDURE (OUTPATIENT)
Dept: MAMMOGRAPHY | Facility: OTHER | Age: 64
End: 2019-12-23
Attending: PHYSICIAN ASSISTANT
Payer: COMMERCIAL

## 2019-12-23 DIAGNOSIS — Z12.31 VISIT FOR SCREENING MAMMOGRAM: ICD-10-CM

## 2019-12-23 PROCEDURE — 77063 BREAST TOMOSYNTHESIS BI: CPT | Mod: TC

## 2020-02-18 DIAGNOSIS — F33.9 EPISODE OF RECURRENT MAJOR DEPRESSIVE DISORDER, UNSPECIFIED DEPRESSION EPISODE SEVERITY (H): ICD-10-CM

## 2020-02-18 DIAGNOSIS — E03.9 ACQUIRED HYPOTHYROIDISM: ICD-10-CM

## 2020-02-19 ENCOUNTER — TELEPHONE (OUTPATIENT)
Dept: FAMILY MEDICINE | Facility: OTHER | Age: 65
End: 2020-02-19

## 2020-02-19 DIAGNOSIS — F41.1 GAD (GENERALIZED ANXIETY DISORDER): Primary | ICD-10-CM

## 2020-02-19 RX ORDER — VENLAFAXINE HYDROCHLORIDE 75 MG/1
75 CAPSULE, EXTENDED RELEASE ORAL DAILY
Qty: 30 CAPSULE | Refills: 3 | Status: SHIPPED | OUTPATIENT
Start: 2020-02-19 | End: 2020-04-14

## 2020-02-19 NOTE — TELEPHONE ENCOUNTER
Patient calling and got hives from the Wellbutrin and would like refill on Effexor 75mg po daily. Not on current Epic med list.    Since about 12.17.2019 she has been taking that dose.    Thank you,    Call pt if questions at     [phone] after 2:30.    Lakeisha Dyson RN

## 2020-02-20 NOTE — TELEPHONE ENCOUNTER
Patient calling again regarding medications. States she is out completely of the effexor and is needing this refilled ASAP as she has already gone a day without it. Please return call ASAP to patient regarding medication refill. Ashley A. Lechevalier, LPN on 2/20/2020 at 2:25 PM

## 2020-02-21 RX ORDER — LEVOTHYROXINE SODIUM 88 UG/1
TABLET ORAL
Qty: 30 TABLET | Refills: 0 | Status: SHIPPED | OUTPATIENT
Start: 2020-02-21 | End: 2020-03-30

## 2020-02-21 RX ORDER — VENLAFAXINE HYDROCHLORIDE 75 MG/1
CAPSULE, EXTENDED RELEASE ORAL
Qty: 30 CAPSULE | Refills: 0 | Status: SHIPPED | OUTPATIENT
Start: 2020-02-21 | End: 2020-08-07

## 2020-02-21 NOTE — TELEPHONE ENCOUNTER
Euthyrox      Last Written Prescription Date:  New medication  Last Fill Quantity: ,   # refills:   Last Office Visit: 11/22/2019  Future Office visit:       Routing refill request to provider for review/approval because:  Drug not active on patient's medication list

## 2020-04-09 ENCOUNTER — TELEPHONE (OUTPATIENT)
Dept: FAMILY MEDICINE | Facility: OTHER | Age: 65
End: 2020-04-09

## 2020-04-09 DIAGNOSIS — N63.0 LUMP OR MASS IN BREAST: Primary | ICD-10-CM

## 2020-04-09 NOTE — TELEPHONE ENCOUNTER
Patient called in and stated that she had a physical a while back and she has a lump on her left breat and it has started to bother her and she has pain into her armpit. Please call patient back to discuss her options. [phone]

## 2020-04-14 ENCOUNTER — VIRTUAL VISIT (OUTPATIENT)
Dept: SURGERY | Facility: OTHER | Age: 65
End: 2020-04-14
Attending: PHYSICIAN ASSISTANT
Payer: COMMERCIAL

## 2020-04-14 VITALS — HEIGHT: 62 IN | BODY MASS INDEX: 28.52 KG/M2 | WEIGHT: 155 LBS

## 2020-04-14 DIAGNOSIS — R07.89 LEFT-SIDED CHEST WALL PAIN: Primary | ICD-10-CM

## 2020-04-14 PROCEDURE — 99213 OFFICE O/P EST LOW 20 MIN: CPT | Mod: 95 | Performed by: SURGERY

## 2020-04-14 RX ORDER — FAMOTIDINE 20 MG/1
20 TABLET, FILM COATED ORAL 2 TIMES DAILY PRN
COMMUNITY
End: 2022-01-17

## 2020-04-14 ASSESSMENT — PAIN SCALES - GENERAL: PAINLEVEL: NO PAIN (0)

## 2020-04-14 ASSESSMENT — MIFFLIN-ST. JEOR: SCORE: 1211.33

## 2020-04-14 NOTE — PROGRESS NOTES
"Idalia Hoang is a 64 year old female who is being evaluated via a billable telephone visit.      The patient has been notified of following:     \"This telephone visit will be conducted via a call between you and your physician/provider. We have found that certain health care needs can be provided without the need for a physical exam.  This service lets us provide the care you need with a short phone conversation.  If a prescription is necessary we can send it directly to your pharmacy.  If lab work is needed we can place an order for that and you can then stop by our lab to have the test done at a later time.    Telephone visits are billed at different rates depending on your insurance coverage. During this emergency period, for some insurers they may be billed the same as an in-person visit.  Please reach out to your insurance provider with any questions.    If during the course of the call the physician/provider feels a telephone visit is not appropriate, you will not be charged for this service.\"    Patient has given verbal consent for Telephone visit?  Yes    How would you like to obtain your AVS? Jose Franciscoharnasim    Additional provider notes:     [facility] Surgery Consultation    CC:  Left breast mass    HPI:  This 64 year old year old female is see at the request of Love Noriega for evaluation of left breast mass.  The history is obtained from the patient, and reviewing the medical record.  She is good medical historian.  She states that she has noticed left-sided breast, chest wall, axillary pain upon waking up in the morning.  She has not noticed any particular lump in the breast but states that the discomfort is on the lateral aspect of her left breast radiating out towards her axilla.  She has had no nipple inversion, nipple drainage, erythema of the breast, skin dimpling, or other abnormalities of the breast.  She has had no symptoms on the right breast.  She states that she sleeps on her left side with " her arm outstretched.  It is when she wakes up in the morning that she has the discomfort along the lateral aspect of her chest wall radiating up into her armpit.  As the day progresses the discomfort improves.  She is concerned about metastatic breast cancer.  She recently underwent a mammogram in December which was normal.  She has had a previous ultrasound last July for similar discomfort which was normal.  While discussing the symptoms with the patient I had her perform multiple maneuvers with her left arm including abduction of her arm where she described discomfort along the lateral aspect of her pectoralis extending up into the axilla.  I also had the patient perform frontal abduction with no discomfort noted.    The patient began menstruation at the age of 15 and began menopause at the age of 51.  She has undergone gone 3 abortions with no live births.  She has never been on hormone replacement.  She has never undergone any breast biopsies.  She has a sister was diagnosed with breast cancer, her sister was on hormone replacement.      Past Medical History:   Diagnosis Date     Acquired hypothyroidism 4/25/2016     Chronic serous otitis media, simple or unspecified 06/18/2012     Depression, recurrent 01/01/2011     Dysfunction of eustachian tube 06/18/2012     Estradiol deficiency 4/25/2016     Hypothyroidism 01/01/2011     Mixed hyperlipidemia 4/25/2016     Osteopenia        Past Surgical History:   Procedure Laterality Date     COLONOSCOPY  04/20/2012     surgical removal  2010    tendon nodule; free of disease       Family History   Problem Relation Age of Onset     Hypertension Sister      Melanoma Sister      C.A.D. Other         grandmother     Cancer Other         lung; grandmother/leukemia; grandfather     Depression Other         family h/o     Hypertension Other         grandmother     Breast Cancer Sister      Other - See Comments Mother         memory loss/sjogrensyndrome/stomach     Osteoporosis  Mother      Alzheimer Disease Mother      Hyperlipidemia Sister      Diabetes Father      Coronary Artery Disease Father         a fib     Thyroid Disease Sister      Asthma Brother        Social History     Tobacco Use     Smoking status: Former Smoker     Packs/day: 0.30     Years: 1.00     Pack years: 0.30     Types: Cigarettes     Smokeless tobacco: Never Used     Tobacco comment: quit 1989; no passive exposure   Substance Use Topics     Alcohol use: Yes     Comment: 1 drink beer and wine daily     Drug use: No       Prior to Admission medications    Medication Sig Start Date End Date Taking? Authorizing Provider   buPROPion (WELLBUTRIN XL) 300 MG 24 hr tablet Take 1 tablet (300 mg) by mouth every morning 11/22/19  Yes Love Noriega PA   EUTHYROX 88 MCG tablet Take 1 tablet by mouth once daily 3/30/20  Yes Love Noriega PA   famotidine (PEPCID) 20 MG tablet Take 20 mg by mouth 2 times daily as needed   Yes Reported, Patient   fluocinonide (LIDEX) 0.05 % external cream Apply topically 2 times daily 11/22/19  Yes Love Noriega PA   Urea 40 % CREA APPLY  CREAM EXTERNALLY TO AFFECTED AREA TWICE DAILY ON  THICK  SCALING  ON  HAND  AND  FEET--CAN  COVER  WITH  GLOVE  OR  SOCK  AT  BEDTIM 11/22/19  Yes Love Noriega PA   VENLAFAXINE 75 MG PO 24 hr capsule TAKE 1 CAPSULE BY MOUTH ONCE DAILY 2/21/20  Yes Love Nroiega PA   venlafaxine (EFFEXOR-XR) 37.5 MG 24 hr capsule Take 1 capsule (37.5 mg) by mouth daily  Patient not taking: Reported on 4/14/2020 11/22/19   Love Noriega PA       Pt denied problems with bleeding or anesthesia  No mood altering drug use.     No Known Allergies    REVIEW OF SYSTEMS:  Ten point review of systems negative except those mentioned in the HPI.     The patient denies sleep apnea, latex allergies or MRSA      IMPRESSION:  64-year-old female with left-sided chest wall discomfort    PLAN:  I discussed with the patient that it does not appear to be metastatic breast  cancer as she is extremely anxious and concerned that the discomfort that she was having could be metastatic breast cancer.  I informed the patient that the discomfort that she is having appears to be related to sleeping positions with her arm outstretched over her head while laying on her left side.  The discomfort appears to be related to the lateral aspect of the pectoralis muscle extending up into the axilla.  With the maneuvers that have the patient perform while talking to her on the phone I believe that the symptoms she has are related to musculoskeletal in nature.  I informed the patient that if her symptoms progress or get worse she can always call our office for further evaluation and imaging.  I went over the signs and symptoms of breast cancer with the patient.  It appears that the patient is extremely anxious regarding metastatic breast cancer and was concerned about her symptoms being related to metastatic breast cancer.  All question concerns were addressed.    Total of 15 and 20 minutes was spent discussing with the patient her symptoms and evaluation.      Thank you for allowing me to participate in the care of your patient.       4/14/2020  9:55 AM    cc:  Love Noriega       Phone call duration: 16 minutes    Laurent Porras MD

## 2020-04-14 NOTE — NURSING NOTE
"Chief Complaint   Patient presents with     Consult     Breast discomfort left. Has some fibrotic cysts.        Initial Ht 1.575 m (5' 2\")   Wt 70.3 kg (155 lb)   BMI 28.35 kg/m   Estimated body mass index is 28.35 kg/m  as calculated from the following:    Height as of this encounter: 1.575 m (5' 2\").    Weight as of this encounter: 70.3 kg (155 lb).  Medication Reconciliation: complete  Nayely Hugo LPN    "

## 2020-04-24 DIAGNOSIS — F33.41 MAJOR DEPRESSIVE DISORDER, RECURRENT EPISODE, IN PARTIAL REMISSION (H): ICD-10-CM

## 2020-04-24 DIAGNOSIS — Z00.00 ROUTINE GENERAL MEDICAL EXAMINATION AT A HEALTH CARE FACILITY: ICD-10-CM

## 2020-04-24 RX ORDER — BUPROPION HYDROCHLORIDE 300 MG/1
TABLET ORAL
Qty: 30 TABLET | Refills: 0 | Status: SHIPPED | OUTPATIENT
Start: 2020-04-24 | End: 2022-01-17

## 2020-04-24 RX ORDER — BUPROPION HYDROCHLORIDE 300 MG/1
300 TABLET ORAL EVERY MORNING
Qty: 30 TABLET | Refills: 0 | Status: SHIPPED | OUTPATIENT
Start: 2020-04-24 | End: 2022-01-17

## 2020-04-24 NOTE — TELEPHONE ENCOUNTER
Failed due to PHQ9  PHQ-9 score:    PHQ 11/25/2019   PHQ-9 Total Score 9   Q9: Thoughts of better off dead/self-harm past 2 weeks Not at all

## 2020-04-24 NOTE — TELEPHONE ENCOUNTER
PHQ-9 score:    PHQ 11/25/2019   PHQ-9 Total Score 9   Q9: Thoughts of better off dead/self-harm past 2 weeks Not at all             See above.Pended.    Lakeisha Dyson RN

## 2020-04-24 NOTE — TELEPHONE ENCOUNTER
wellbutrin      Last Written Prescription Date:  111/22/19  Last Fill Quantity: 30,   # refills: 1  Last Office Visit: 11/22/19  Future Office visit:

## 2020-04-24 NOTE — TELEPHONE ENCOUNTER
wellbutrin  Last Written Prescription Date: 11/22/19  Last Fill Quantity: 30 # of Refills: 1  Last Office Visit: 11/22/19

## 2020-05-06 DIAGNOSIS — E03.9 ACQUIRED HYPOTHYROIDISM: ICD-10-CM

## 2020-05-07 RX ORDER — LEVOTHYROXINE SODIUM 88 UG/1
TABLET ORAL
Qty: 30 TABLET | Refills: 6 | Status: SHIPPED | OUTPATIENT
Start: 2020-05-07 | End: 2020-12-29

## 2020-08-06 ENCOUNTER — OFFICE VISIT (OUTPATIENT)
Dept: FAMILY MEDICINE | Facility: OTHER | Age: 65
End: 2020-08-06
Attending: PHYSICIAN ASSISTANT
Payer: COMMERCIAL

## 2020-08-06 ENCOUNTER — NURSE TRIAGE (OUTPATIENT)
Dept: FAMILY MEDICINE | Facility: OTHER | Age: 65
End: 2020-08-06

## 2020-08-06 DIAGNOSIS — R05.9 COUGH: Primary | ICD-10-CM

## 2020-08-06 PROCEDURE — U0003 INFECTIOUS AGENT DETECTION BY NUCLEIC ACID (DNA OR RNA); SEVERE ACUTE RESPIRATORY SYNDROME CORONAVIRUS 2 (SARS-COV-2) (CORONAVIRUS DISEASE [COVID-19]), AMPLIFIED PROBE TECHNIQUE, MAKING USE OF HIGH THROUGHPUT TECHNOLOGIES AS DESCRIBED BY CMS-2020-01-R: HCPCS | Performed by: PHYSICIAN ASSISTANT

## 2020-08-06 NOTE — TELEPHONE ENCOUNTER
Patient woke up this morning with cough and headache. Patient denies any difficulty breathing or chest pain. Patient scheduled for COVID testing.     Reason for Disposition    [1] COVID-19 infection suspected by caller or triager AND [2] mild symptoms (cough, fever, or others) AND [3] no complications or SOB    Additional Information    Negative: SEVERE difficulty breathing (e.g., struggling for each breath, speaks in single words)    Negative: Difficult to awaken or acting confused (e.g., disoriented, slurred speech)    Negative: Bluish (or gray) lips or face now    Negative: Shock suspected (e.g., cold/pale/clammy skin, too weak to stand, low BP, rapid pulse)    Negative: Sounds like a life-threatening emergency to the triager    Negative: [1] COVID-19 exposure AND [2] no symptoms    Negative: COVID-19 and Breastfeeding, questions about    Negative: [1] Adult with possible COVID-19 symptoms AND [2] triager concerned about severity of symptoms or other causes    Negative: SEVERE or constant chest pain or pressure (Exception: mild central chest pain, present only when coughing)    Negative: MODERATE difficulty breathing (e.g., speaks in phrases, SOB even at rest, pulse 100-120)    Negative: Patient sounds very sick or weak to the triager    Negative: MILD difficulty breathing (e.g., minimal/no SOB at rest, SOB with walking, pulse <100)    Negative: Chest pain or pressure    Negative: Fever > 103 F (39.4 C)    Negative: [1] Fever > 101 F (38.3 C) AND [2] age > 60    Negative: [1] Fever > 100.0 F (37.8 C) AND [2] bedridden (e.g., nursing home patient, CVA, chronic illness, recovering from surgery)    Negative: HIGH RISK patient (e.g., age > 64 years, diabetes, heart or lung disease, weak immune system)    Negative: Fever present > 3 days (72 hours)    Negative: [1] Fever returns after gone for over 24 hours AND [2] symptoms worse or not improved    Negative: [1] Continuous (nonstop) coughing interferes with work or  "school AND [2] no improvement using cough treatment per protocol    Answer Assessment - Initial Assessment Questions  1. COVID-19 DIAGNOSIS: \"Who made your Coronavirus (COVID-19) diagnosis?\" \"Was it confirmed by a positive lab test?\" If not diagnosed by a HCP, ask \"Are there lots of cases (community spread) where you live?\" (See public health department website, if unsure)      No diagnosis  2. ONSET: \"When did the COVID-19 symptoms start?\"       This morning  3. WORST SYMPTOM: \"What is your worst symptom?\" (e.g., cough, fever, shortness of breath, muscle aches)      Headache, cough  4. COUGH: \"Do you have a cough?\" If so, ask: \"How bad is the cough?\"        Cough, slight   5. FEVER: \"Do you have a fever?\" If so, ask: \"What is your temperature, how was it measured, and when did it start?\"      no  6. RESPIRATORY STATUS: \"Describe your breathing?\" (e.g., shortness of breath, wheezing, unable to speak)       no  7. BETTER-SAME-WORSE: \"Are you getting better, staying the same or getting worse compared to yesterday?\"  If getting worse, ask, \"In what way?\"      worse  8. HIGH RISK DISEASE: \"Do you have any chronic medical problems?\" (e.g., asthma, heart or lung disease, weak immune system, etc.)      no  9. PREGNANCY: \"Is there any chance you are pregnant?\" \"When was your last menstrual period?\"      no  10. OTHER SYMPTOMS: \"Do you have any other symptoms?\"  (e.g., chills, fatigue, headache, loss of smell or taste, muscle pain, sore throat)        no    Protocols used: CORONAVIRUS (COVID-19) DIAGNOSED OR [id number] 5.16.20    "

## 2020-08-07 DIAGNOSIS — F33.9 EPISODE OF RECURRENT MAJOR DEPRESSIVE DISORDER, UNSPECIFIED DEPRESSION EPISODE SEVERITY (H): ICD-10-CM

## 2020-08-07 RX ORDER — VENLAFAXINE HYDROCHLORIDE 75 MG/1
CAPSULE, EXTENDED RELEASE ORAL
Qty: 30 CAPSULE | Refills: 0 | Status: SHIPPED | OUTPATIENT
Start: 2020-08-07 | End: 2020-09-10

## 2020-08-08 LAB
SARS-COV-2 RNA SPEC QL NAA+PROBE: NOT DETECTED
SPECIMEN SOURCE: NORMAL

## 2020-09-10 DIAGNOSIS — F33.9 EPISODE OF RECURRENT MAJOR DEPRESSIVE DISORDER, UNSPECIFIED DEPRESSION EPISODE SEVERITY (H): ICD-10-CM

## 2020-09-10 RX ORDER — VENLAFAXINE HYDROCHLORIDE 75 MG/1
CAPSULE, EXTENDED RELEASE ORAL
Qty: 30 CAPSULE | Refills: 0 | Status: SHIPPED | OUTPATIENT
Start: 2020-09-10 | End: 2020-10-13

## 2020-09-10 NOTE — TELEPHONE ENCOUNTER
Effexor       Last Written Prescription Date:  8/7/2020  Last Fill Quantity: 30,   # refills: 0  Last Office Visit: 4/14/2020  Future Office visit:

## 2020-10-12 DIAGNOSIS — F33.9 EPISODE OF RECURRENT MAJOR DEPRESSIVE DISORDER, UNSPECIFIED DEPRESSION EPISODE SEVERITY (H): ICD-10-CM

## 2020-10-13 RX ORDER — VENLAFAXINE HYDROCHLORIDE 75 MG/1
CAPSULE, EXTENDED RELEASE ORAL
Qty: 30 CAPSULE | Refills: 0 | Status: SHIPPED | OUTPATIENT
Start: 2020-10-13 | End: 2020-11-09

## 2020-10-13 NOTE — TELEPHONE ENCOUNTER
Venlafaxine 75 mg      Last Written Prescription Date:  9-  Last Fill Quantity: 30,   # refills: 0  Last Office Visit: 11-22-19  Future Office visit:

## 2020-11-07 ENCOUNTER — HEALTH MAINTENANCE LETTER (OUTPATIENT)
Age: 65
End: 2020-11-07

## 2020-11-08 DIAGNOSIS — F33.9 EPISODE OF RECURRENT MAJOR DEPRESSIVE DISORDER, UNSPECIFIED DEPRESSION EPISODE SEVERITY (H): ICD-10-CM

## 2020-11-09 RX ORDER — VENLAFAXINE HYDROCHLORIDE 75 MG/1
CAPSULE, EXTENDED RELEASE ORAL
Qty: 30 CAPSULE | Refills: 0 | Status: SHIPPED | OUTPATIENT
Start: 2020-11-09 | End: 2020-12-22

## 2020-11-09 NOTE — TELEPHONE ENCOUNTER
effexor      Last Written Prescription Date:  10/13/2020  Last Fill Quantity: 30,   # refills: 0  Last Office Visit: 11/22/19  Future Office visit:

## 2020-12-21 DIAGNOSIS — F33.9 EPISODE OF RECURRENT MAJOR DEPRESSIVE DISORDER, UNSPECIFIED DEPRESSION EPISODE SEVERITY (H): ICD-10-CM

## 2020-12-21 NOTE — LETTER
December 22, 2020      Idalia Hoang  [address] [facility] [address]        Dear Idalia,     APPOINTMENT REMINDER:   Our records indicate that it is time for you to be seen for an office visit.      Your current medication request will be approved for one refill but you will need to be seen before any additional refills can be approved.  Taking care of your health is important to us, and ongoing visits with your provider are vital to your care.    We look forward to seeing you in the near future.  You may call our office at [phone] to schedule a visit.     Please disregard this notice if you have already made an appointment.        Sincerely,      MARCOS John

## 2020-12-21 NOTE — TELEPHONE ENCOUNTER
venlafaxine (EFFEXOR-XR) 75 MG 24 hr capsule     Last Written Prescription Date:  11/9/20  Last Fill Quantity: 30,   # refills: 0  Last Office Visit: 11/22/19  Future Office visit:       Routing refill request to provider for review/approval

## 2020-12-22 RX ORDER — VENLAFAXINE HYDROCHLORIDE 75 MG/1
CAPSULE, EXTENDED RELEASE ORAL
Qty: 30 CAPSULE | Refills: 0 | Status: SHIPPED | OUTPATIENT
Start: 2020-12-22 | End: 2021-01-25

## 2020-12-28 DIAGNOSIS — E03.9 ACQUIRED HYPOTHYROIDISM: ICD-10-CM

## 2020-12-29 RX ORDER — LEVOTHYROXINE SODIUM 88 UG/1
TABLET ORAL
Qty: 30 TABLET | Refills: 0 | Status: SHIPPED | OUTPATIENT
Start: 2020-12-29 | End: 2021-01-25

## 2020-12-29 NOTE — TELEPHONE ENCOUNTER
EUTHYROX 88 MCG tablet      Last Written Prescription Date:  5/7/20  Last Fill Quantity: 30,   # refills: 6  Last Office Visit: 8/6/20  Future Office visit:       Routing refill request to provider for review/approval because:

## 2020-12-30 NOTE — PROGRESS NOTES
Date of Service: 09/02/2020    COMPREHENSIVE MEDICAL NEUROPSYCHIATRIC TELEPHONIC FOLLOWUP EVALUATION     This is a 30-minute comprehensive telephonic evaluation.      Bartolome as usual is very pleasant and interactive in this consultative session.  He shares the severe profound dysphoric, anxious anticipation, intense generalized anxiety as well as lifelong difficulties with attention, concentration and focus that so troubled him in the past are all fundamentally well controlled at present.  Significant dysphoric anxiety is noted episodically.    Mental status examination reveals no signs of thought disorder, psychosis or organic mental disease.  Mood is euthymic and well modulated.  Affect is appropriate to content.  Bartolome denies suicidal, homicidal and all other violent ideation on exam.    A 14-point medical review of systems is negative or noncontributory aside from that described above.  Alcohol and other drug abuse are denied.    ENCOUNTER DIAGNOSES:  F41.1, severe generalized anxiety disorder.  F90.0, attention deficit disorder, inattentive type.    MEDICAL DECISION MAKING AND TREATMENT PLAN:  Bartolome is provided a comprehensive medical neuropsychiatric medication regimen as thoroughly reviewed, adjusted and prescribed as per Epic.    CONTINUE ACTIVE CARE AND TREATMENT PLAN:  Return to clinic for followup.      Dictated By: Jose Byrnes MD  Signing Provider: Jose Byrnes MD    [email or web address]/[email or web address] ([id number])  DD: 12/30/2020 09:09:39 TD: 12/30/2020 12:17:53    Copy Sent To:      Subjective     Idalia Hoang is a 63 year old female who presents to clinic today for the following health issues:    HPI   Breast exam      Duration: 3 months     Description (location/character/radiation): pt states she feels a heavy feeling in her left breast that comes and goes. Pt states there is an uncomfortable feeling associated with the heaviness    Intensity:  mild    Accompanying signs and symptoms: above    History (similar episodes/previous evaluation): None    Precipitating or alleviating factors: None    Therapies tried and outcome: None         Patient Active Problem List   Diagnosis     Advanced care planning/counseling discussion     Lichen simplex chronicus     Acquired hypothyroidism     Estradiol deficiency     Mixed hyperlipidemia     ACP (advance care planning)     Family history of melanoma     Past Surgical History:   Procedure Laterality Date     COLONOSCOPY  04/20/2012     surgical removal  2010    tendon nodule; free of disease       Social History     Tobacco Use     Smoking status: Former Smoker     Packs/day: 0.30     Years: 1.00     Pack years: 0.30     Types: Cigarettes     Smokeless tobacco: Never Used     Tobacco comment: quit 1989; no passive exposure   Substance Use Topics     Alcohol use: Yes     Comment: 1 drink beer and wine daily     Family History   Problem Relation Age of Onset     Hypertension Sister      Melanoma Sister      C.A.D. Other         grandmother     Cancer Other         lung; grandmother/leukemia; grandfather     Depression Other         family h/o     Hypertension Other         grandmother     Breast Cancer Sister      Other - See Comments Mother         memory loss/sjogrensyndrome/stomach     Osteoporosis Mother      Alzheimer Disease Mother      Hyperlipidemia Sister      Diabetes Father      Coronary Artery Disease Father         a fib     Thyroid Disease Sister      Asthma Brother          Current Outpatient Medications   Medication Sig Dispense Refill      levothyroxine (SYNTHROID/LEVOTHROID) 88 MCG tablet TAKE 1 TABLET BY MOUTH ONCE DAILY 90 tablet 1     levothyroxine (SYNTHROID/LEVOTHROID) 88 MCG tablet TAKE ONE TABLET BY MOUTH ONCE DAILY 90 tablet 1     Urea 40 % CREA APPLY  CREAM EXTERNALLY TO AFFECTED AREA TWICE DAILY ON  THICK  SCALING  ON  HAND  AND  FEET--CAN  COVER  WITH  GLOVE  OR  SOCK  AT  BEDTIM 199 g 3     venlafaxine (EFFEXOR-XR) 75 MG 24 hr capsule Take 1 capsule (75 mg) by mouth daily 90 capsule 0     No Known Allergies  Recent Labs   Lab Test 11/28/18  0740 11/06/17  1035 10/23/17  1425 07/18/16  0907 06/22/15  0824   * 167*  --  152* 194*   HDL 63 71  --  65 59   TRIG 134 113  --  167* 134   ALT 18  --   --  22 30   CR 0.67  --   --  0.78 0.84   GFRESTIMATED 89  --   --  75 70   GFRESTBLACK >90  --   --  >90   GFR Calc   84   POTASSIUM 4.1  --   --  4.3 4.1   TSH 0.55  --  1.40 0.84 0.68      BP Readings from Last 3 Encounters:   07/01/19 104/72   11/21/18 128/76   10/23/17 112/78    Wt Readings from Last 3 Encounters:   07/01/19 68.9 kg (152 lb)   11/21/18 65.8 kg (145 lb)   10/23/17 66.7 kg (147 lb)                      Reviewed and updated as needed this visit by Provider         Review of Systems   ROS COMP: Constitutional, HEENT, cardiovascular, pulmonary, gi and gu systems are negative, except as otherwise noted.      Objective    /72 (Patient Position: Sitting)   Pulse 73   Wt 68.9 kg (152 lb)   SpO2 99%   BMI 25.29 kg/m    Body mass index is 25.29 kg/m .  Physical Exam   GENERAL: healthy, alert and no distress  EYES: Eyes grossly normal to inspection, PERRL and conjunctivae and sclerae normal  HENT: ear canals and TM's normal, nose and mouth without ulcers or lesions  NECK: no adenopathy, no asymmetry, masses, or scars and thyroid normal to palpation  RESP: lungs clear to auscultation - no rales, rhonchi or wheezes  BREAST: left breast has a large firm cystic structure on left upper outer breast.   No  nipple discharge and no changes in her skin color. Right breast is clear.   CV: regular rate and rhythm, normal S1 S2, no S3 or S4, no murmur, click or rub, no peripheral edema and peripheral pulses strong  ABDOMEN: soft, nontender, no hepatosplenomegaly, no masses and bowel sounds normal  MS: no gross musculoskeletal defects noted, no edema            Assessment & Plan     1. Left breast mass  She is going to have an ultrasound and repeat her mammogram if needed.   - US Breast Left Complete 4 Quadrants; Future       See Patient Instructions      MARCOS John  [facility] - [place]

## 2021-01-15 ENCOUNTER — HEALTH MAINTENANCE LETTER (OUTPATIENT)
Age: 66
End: 2021-01-15

## 2021-01-23 DIAGNOSIS — F33.9 EPISODE OF RECURRENT MAJOR DEPRESSIVE DISORDER, UNSPECIFIED DEPRESSION EPISODE SEVERITY (H): ICD-10-CM

## 2021-01-23 DIAGNOSIS — E03.9 ACQUIRED HYPOTHYROIDISM: ICD-10-CM

## 2021-01-25 ENCOUNTER — TELEPHONE (OUTPATIENT)
Dept: FAMILY MEDICINE | Facility: OTHER | Age: 66
End: 2021-01-25

## 2021-01-25 DIAGNOSIS — F33.41 MAJOR DEPRESSIVE DISORDER, RECURRENT EPISODE, IN PARTIAL REMISSION (H): Primary | ICD-10-CM

## 2021-01-25 RX ORDER — LEVOTHYROXINE SODIUM 88 UG/1
TABLET ORAL
Qty: 30 TABLET | Refills: 0 | Status: SHIPPED | OUTPATIENT
Start: 2021-01-25 | End: 2021-02-25

## 2021-01-25 RX ORDER — VENLAFAXINE HYDROCHLORIDE 75 MG/1
CAPSULE, EXTENDED RELEASE ORAL
Qty: 30 CAPSULE | Refills: 0 | Status: SHIPPED | OUTPATIENT
Start: 2021-01-25 | End: 2021-02-25

## 2021-01-25 NOTE — TELEPHONE ENCOUNTER
euthyrox      Last Written Prescription Date:  12/29/2020  Last Fill Quantity: 30,   # refills: 0  Last Office Visit: 11/22/2019  Future Office visit:

## 2021-01-25 NOTE — TELEPHONE ENCOUNTER
Effexor XR 75 mg      Last Written Prescription Date:  12/22/20  Last Fill Quantity: 30,   # refills: 0  Last Office Visit: 11/22/19  Future Office visit:       Routing refill request to provider for review/approval because:

## 2021-01-27 DIAGNOSIS — F33.41 MAJOR DEPRESSIVE DISORDER, RECURRENT EPISODE, IN PARTIAL REMISSION (H): ICD-10-CM

## 2021-01-27 LAB
ALBUMIN SERPL-MCNC: 4 G/DL (ref 3.4–5)
ALP SERPL-CCNC: 77 U/L (ref 40–150)
ALT SERPL W P-5'-P-CCNC: 22 U/L (ref 0–50)
ANION GAP SERPL CALCULATED.3IONS-SCNC: 7 MMOL/L (ref 3–14)
AST SERPL W P-5'-P-CCNC: 15 U/L (ref 0–45)
BASOPHILS # BLD AUTO: 0.1 10E9/L (ref 0–0.2)
BASOPHILS NFR BLD AUTO: 0.6 %
BILIRUB SERPL-MCNC: 0.3 MG/DL (ref 0.2–1.3)
BUN SERPL-MCNC: 20 MG/DL (ref 7–30)
CALCIUM SERPL-MCNC: 8.8 MG/DL (ref 8.5–10.1)
CHLORIDE SERPL-SCNC: 108 MMOL/L (ref 94–109)
CO2 SERPL-SCNC: 25 MMOL/L (ref 20–32)
CREAT SERPL-MCNC: 0.88 MG/DL (ref 0.52–1.04)
DIFFERENTIAL METHOD BLD: NORMAL
EOSINOPHIL # BLD AUTO: 0.3 10E9/L (ref 0–0.7)
EOSINOPHIL NFR BLD AUTO: 4.2 %
ERYTHROCYTE [DISTWIDTH] IN BLOOD BY AUTOMATED COUNT: 13.4 % (ref 10–15)
GFR SERPL CREATININE-BSD FRML MDRD: 69 ML/MIN/{1.73_M2}
GLUCOSE SERPL-MCNC: 127 MG/DL (ref 70–99)
HCT VFR BLD AUTO: 39.4 % (ref 35–47)
HGB BLD-MCNC: 13.3 G/DL (ref 11.7–15.7)
IMM GRANULOCYTES # BLD: 0 10E9/L (ref 0–0.4)
IMM GRANULOCYTES NFR BLD: 0.2 %
LYMPHOCYTES # BLD AUTO: 2.5 10E9/L (ref 0.8–5.3)
LYMPHOCYTES NFR BLD AUTO: 31.1 %
MCH RBC QN AUTO: 32.2 PG (ref 26.5–33)
MCHC RBC AUTO-ENTMCNC: 33.8 G/DL (ref 31.5–36.5)
MCV RBC AUTO: 95 FL (ref 78–100)
MONOCYTES # BLD AUTO: 0.5 10E9/L (ref 0–1.3)
MONOCYTES NFR BLD AUTO: 6.6 %
NEUTROPHILS # BLD AUTO: 4.6 10E9/L (ref 1.6–8.3)
NEUTROPHILS NFR BLD AUTO: 57.3 %
NRBC # BLD AUTO: 0 10*3/UL
NRBC BLD AUTO-RTO: 0 /100
PLATELET # BLD AUTO: 264 10E9/L (ref 150–450)
POTASSIUM SERPL-SCNC: 3.7 MMOL/L (ref 3.4–5.3)
PROT SERPL-MCNC: 7.4 G/DL (ref 6.8–8.8)
RBC # BLD AUTO: 4.13 10E12/L (ref 3.8–5.2)
SODIUM SERPL-SCNC: 140 MMOL/L (ref 133–144)
TSH SERPL DL<=0.005 MIU/L-ACNC: 0.82 MU/L (ref 0.4–4)
WBC # BLD AUTO: 8.1 10E9/L (ref 4–11)

## 2021-01-27 PROCEDURE — 80050 GENERAL HEALTH PANEL: CPT | Performed by: PHYSICIAN ASSISTANT

## 2021-01-27 PROCEDURE — 36415 COLL VENOUS BLD VENIPUNCTURE: CPT | Performed by: PHYSICIAN ASSISTANT

## 2021-02-25 DIAGNOSIS — E03.9 ACQUIRED HYPOTHYROIDISM: ICD-10-CM

## 2021-02-25 DIAGNOSIS — F33.9 EPISODE OF RECURRENT MAJOR DEPRESSIVE DISORDER, UNSPECIFIED DEPRESSION EPISODE SEVERITY (H): ICD-10-CM

## 2021-02-25 RX ORDER — VENLAFAXINE HYDROCHLORIDE 75 MG/1
CAPSULE, EXTENDED RELEASE ORAL
Qty: 30 CAPSULE | Refills: 0 | Status: SHIPPED | OUTPATIENT
Start: 2021-02-25 | End: 2021-03-30

## 2021-02-25 RX ORDER — LEVOTHYROXINE SODIUM 88 UG/1
TABLET ORAL
Qty: 30 TABLET | Refills: 0 | Status: SHIPPED | OUTPATIENT
Start: 2021-02-25 | End: 2021-03-30

## 2021-02-25 NOTE — TELEPHONE ENCOUNTER
Euthyrox 88mcg      Last Written Prescription Date:  1/25/21  Last Fill Quantity: 30,   # refills: 0  Last Office Visit: 11/22/19  Future Office visit:       Routing refill request to provider for review/approval because:    Effexor XR 75mg      Last Written Prescription Date:  1/25/21  Last Fill Quantity: 30,   # refills: 0  Last Office Visit: 11/22/19  Future Office visit:       Routing refill request to provider for review/approval because:

## 2021-03-26 DIAGNOSIS — F33.9 EPISODE OF RECURRENT MAJOR DEPRESSIVE DISORDER, UNSPECIFIED DEPRESSION EPISODE SEVERITY (H): ICD-10-CM

## 2021-03-26 DIAGNOSIS — E03.9 ACQUIRED HYPOTHYROIDISM: ICD-10-CM

## 2021-03-26 NOTE — TELEPHONE ENCOUNTER
Euthyrox       Last Written Prescription Date:  2/25/2021  Last Fill Quantity: 30,   # refills: 0    Effexor       Last Written Prescription Date:  2/25/2021  Last Fill Quantity: 30,   # refills: 0  Last Office Visit: 11/22/2019  Future Office visit:

## 2021-03-30 RX ORDER — LEVOTHYROXINE SODIUM 88 UG/1
TABLET ORAL
Qty: 30 TABLET | Refills: 0 | Status: SHIPPED | OUTPATIENT
Start: 2021-03-30 | End: 2021-04-30

## 2021-03-30 RX ORDER — VENLAFAXINE HYDROCHLORIDE 75 MG/1
CAPSULE, EXTENDED RELEASE ORAL
Qty: 30 CAPSULE | Refills: 0 | Status: SHIPPED | OUTPATIENT
Start: 2021-03-30 | End: 2021-04-30

## 2021-04-30 DIAGNOSIS — E03.9 ACQUIRED HYPOTHYROIDISM: ICD-10-CM

## 2021-04-30 DIAGNOSIS — F33.9 EPISODE OF RECURRENT MAJOR DEPRESSIVE DISORDER, UNSPECIFIED DEPRESSION EPISODE SEVERITY (H): ICD-10-CM

## 2021-04-30 RX ORDER — VENLAFAXINE HYDROCHLORIDE 75 MG/1
CAPSULE, EXTENDED RELEASE ORAL
Qty: 30 CAPSULE | Refills: 0 | Status: SHIPPED | OUTPATIENT
Start: 2021-04-30 | End: 2021-06-02

## 2021-04-30 RX ORDER — LEVOTHYROXINE SODIUM 88 UG/1
TABLET ORAL
Qty: 90 TABLET | Refills: 0 | Status: SHIPPED | OUTPATIENT
Start: 2021-04-30 | End: 2021-07-07

## 2021-04-30 NOTE — TELEPHONE ENCOUNTER
Effexor  Last Written Prescription Date: 11/22/19  Last Fill Quantity: 30 # of Refills: 1  Last Office Visit: 11/22/19

## 2021-04-30 NOTE — TELEPHONE ENCOUNTER
Synthroid 88mcg      Last Written Prescription Date:  7/19/16  Last Fill Quantity: 90,   # refills: 2  Last Office Visit: 11/22/19  Future Office visit:       Routing refill request to provider for review/approval because:  Drug not active on patient's medication list

## 2021-05-27 NOTE — TELEPHONE ENCOUNTER
venlafaxine (EFFEXOR-XR) 75 MG 24 hr capsule- patient states never picked up 1-15-19 rx patient is needing new rx sent in   Last Written Prescription Date:  1/15/19  Last Fill Quantity: 90,   # refills: 1  Last Office Visit: 11/21/18  Future Office visit:          Please bring your supply with you.    Thank you for choosing [facility] as partners in your care.  Please read the following information about your treatment.    Treatment:  Layered Compression Bandaging (Two  -layer)    What is it?  The layered compression bandaging has a layer of absorbent material that will soak up drainage.     Why we do it.   This is done to treat swelling, wounds, or both.  This will in turn help circulation and healing.    How to care for your bandages.  The wraps need to be kept dry. If  the wraps become wet, remove them and call the clinic to have another wrap applied.    What to expect.  It is common for the wraps to be uncomfortable at the beginning. The first two days are usually the hardest; then they will become more comfortable.       Elevating your legs will help the discomfort. Try to elevate your legs as much as possible.    If rest and elevation does not help your discomfort, call your provider.  If your provider is not available you can remove the wrap and leave a message for further instructions.      If you have any questions, please contact [facility] [place] at [phone].

## 2021-06-02 DIAGNOSIS — F33.9 EPISODE OF RECURRENT MAJOR DEPRESSIVE DISORDER, UNSPECIFIED DEPRESSION EPISODE SEVERITY (H): ICD-10-CM

## 2021-06-02 NOTE — TELEPHONE ENCOUNTER
venlafaxine (EFFEXOR-XR) 75 MG 24 hr capsule      Last Written Prescription Date:  04/30/21  Last Fill Quantity: 30,   # refills: 0  Last Office Visit: 11/22/19  Future Office visit:       Routing refill request to provider for review/approval because:

## 2021-06-03 RX ORDER — VENLAFAXINE HYDROCHLORIDE 75 MG/1
CAPSULE, EXTENDED RELEASE ORAL
Qty: 30 CAPSULE | Refills: 3 | Status: SHIPPED | OUTPATIENT
Start: 2021-06-03 | End: 2021-10-18

## 2021-07-07 DIAGNOSIS — E03.9 ACQUIRED HYPOTHYROIDISM: ICD-10-CM

## 2021-07-07 DIAGNOSIS — F33.9 EPISODE OF RECURRENT MAJOR DEPRESSIVE DISORDER, UNSPECIFIED DEPRESSION EPISODE SEVERITY (H): ICD-10-CM

## 2021-07-07 RX ORDER — LEVOTHYROXINE SODIUM 88 UG/1
88 TABLET ORAL DAILY
Qty: 90 TABLET | Refills: 1 | Status: SHIPPED | OUTPATIENT
Start: 2021-07-07 | End: 2022-02-10

## 2021-07-07 NOTE — TELEPHONE ENCOUNTER
Patient does not have insurance at this time and will schedule an appointment when she has insurance.    levothyroxine (SYNTHROID/LEVOTHROID) 88 MCG tablet      Last Written Prescription Date:  04/30/21  Last Fill Quantity: 90,   # refills: 0  Last Office Visit: 11/22/19  Future Office visit:       Routing refill request to provider for review/approval because:

## 2021-09-11 ENCOUNTER — HEALTH MAINTENANCE LETTER (OUTPATIENT)
Age: 66
End: 2021-09-11

## 2021-10-18 DIAGNOSIS — F33.9 EPISODE OF RECURRENT MAJOR DEPRESSIVE DISORDER, UNSPECIFIED DEPRESSION EPISODE SEVERITY (H): ICD-10-CM

## 2021-10-18 RX ORDER — VENLAFAXINE HYDROCHLORIDE 75 MG/1
CAPSULE, EXTENDED RELEASE ORAL
Qty: 30 CAPSULE | Refills: 1 | Status: SHIPPED | OUTPATIENT
Start: 2021-10-18 | End: 2021-12-30

## 2021-11-26 ENCOUNTER — TELEPHONE (OUTPATIENT)
Dept: NURSING | Facility: CLINIC | Age: 66
End: 2021-11-26

## 2021-11-26 NOTE — TELEPHONE ENCOUNTER
Outreach made to patient, below information reviewed with Patient.     This is, Nayely Ortiz RN from [facility] calling in regards to the Moderna Booster vaccination you received at the [facility] on November 19th.  We are calling to let you know that the dose you received on the 19th was considered a full dose of the Moderna vaccine identical to your first two doses.  You should have received half of the full dose instead as a booster.  While we do not anticipate the increased dose of the Moderna booster will result any serious side effects, we are truly sorry for this mistake.    It is important to note that the full, 0.5 milliliters dose of the Moderna vaccine was tested on patients during clinical trials and is currently being offered to individuals who are immunocompromised as a  third dose.  In effect, individuals impacted by this error received the dose we would normally administer to immunocompromised patients as a  third dose.      During different studies higher and lower Moderna doses have been used for both primary series immunizations and booster immunizations.  These different doses have resulted in similar side effect that include chills, fever, headache, malaise and/or fatigue, joint stiffness, nausea and general muscle pain.  Local symptoms include booster site redness, swelling, pain and tenderness.  In general patients who receive higher doses of the Moderna vaccine may experience a higher prevalence of the noted side effects due to the potential for a greater immune response.  However, most patients will experience mild symptoms similar to previous administration of this vaccine and in the vast majority of cases, symptoms start within a few days of receiving the vaccine.  While rare, myocarditis (inflammation of the heart muscle) and pericarditis (inflammation of the lining outside of the heart) have occurred in some people who received COVID vaccines of all  manufactures.      An email will be sent to you summarizing this conversation.  At this time, unless you are experiencing any severe side effects, no follow-up on your part is needed.  The email will also provide contact information's for both local and system members of [facility] to help answer any questions you may have.     At what point should you call a doctor?  In most cases, discomfort from pain or fever is a normal sign that your body is building protection, the CDC states. Still, the agency recommends you contact your doctor or healthcare provider if:    The redness or tenderness where you got the shot gets worse after 24 hours    Your side effects are worrying you or do not seem to be going away after a few days  Anyone who believes they are experiencing a severe allergic reaction should also seek immediate medical care by calling 911, the CDC recommends.    Conclusion  While we do not anticipate the increased dose of the Moderna booster will result any serious side effects, we are truly sorry for this mistake .          Nayely Ortiz RN November 26, 2021 3:23 PM

## 2021-12-29 DIAGNOSIS — F33.9 EPISODE OF RECURRENT MAJOR DEPRESSIVE DISORDER, UNSPECIFIED DEPRESSION EPISODE SEVERITY (H): ICD-10-CM

## 2021-12-30 RX ORDER — VENLAFAXINE HYDROCHLORIDE 75 MG/1
CAPSULE, EXTENDED RELEASE ORAL
Qty: 30 CAPSULE | Refills: 0 | Status: SHIPPED | OUTPATIENT
Start: 2021-12-30 | End: 2022-01-17

## 2021-12-30 NOTE — TELEPHONE ENCOUNTER
Effexor      Last Written Prescription Date:  10/18/21  Last Fill Quantity: 30,   # refills: 1  Last Office Visit: 11/22/19  Future Office visit:    Next 5 appointments (look out 90 days)    Jan 17, 2022  3:30 PM  (Arrive by 3:15 PM)  PHYSICAL with MARCOS Nelson  [facility] [place] ([facility] [place] ) [address]  [place] [address]  [phone]           Routing refill request to provider for review/approval because:

## 2022-01-17 ENCOUNTER — OFFICE VISIT (OUTPATIENT)
Dept: FAMILY MEDICINE | Facility: OTHER | Age: 67
End: 2022-01-17
Attending: PHYSICIAN ASSISTANT
Payer: COMMERCIAL

## 2022-01-17 VITALS
OXYGEN SATURATION: 98 % | TEMPERATURE: 98.4 F | WEIGHT: 150.6 LBS | HEART RATE: 74 BPM | BODY MASS INDEX: 26.68 KG/M2 | HEIGHT: 63 IN | DIASTOLIC BLOOD PRESSURE: 70 MMHG | SYSTOLIC BLOOD PRESSURE: 120 MMHG

## 2022-01-17 DIAGNOSIS — Z00.00 ROUTINE GENERAL MEDICAL EXAMINATION AT A HEALTH CARE FACILITY: ICD-10-CM

## 2022-01-17 DIAGNOSIS — R53.83 OTHER FATIGUE: ICD-10-CM

## 2022-01-17 DIAGNOSIS — F33.9 EPISODE OF RECURRENT MAJOR DEPRESSIVE DISORDER, UNSPECIFIED DEPRESSION EPISODE SEVERITY (H): ICD-10-CM

## 2022-01-17 DIAGNOSIS — E03.9 ACQUIRED HYPOTHYROIDISM: ICD-10-CM

## 2022-01-17 DIAGNOSIS — Z23 NEED FOR VACCINATION: ICD-10-CM

## 2022-01-17 DIAGNOSIS — Z12.31 VISIT FOR SCREENING MAMMOGRAM: ICD-10-CM

## 2022-01-17 DIAGNOSIS — Z78.0 ASYMPTOMATIC POSTMENOPAUSAL ESTROGEN DEFICIENCY: Primary | ICD-10-CM

## 2022-01-17 DIAGNOSIS — Z12.31 ENCOUNTER FOR SCREENING MAMMOGRAM FOR BREAST CANCER: ICD-10-CM

## 2022-01-17 DIAGNOSIS — Z00.00 WELCOME TO MEDICARE PREVENTIVE VISIT: ICD-10-CM

## 2022-01-17 PROCEDURE — G0008 ADMIN INFLUENZA VIRUS VAC: HCPCS

## 2022-01-17 PROCEDURE — G0402 INITIAL PREVENTIVE EXAM: HCPCS | Performed by: PHYSICIAN ASSISTANT

## 2022-01-17 PROCEDURE — G0463 HOSPITAL OUTPT CLINIC VISIT: HCPCS

## 2022-01-17 PROCEDURE — G0402 INITIAL PREVENTIVE EXAM: HCPCS | Mod: 25 | Performed by: PHYSICIAN ASSISTANT

## 2022-01-17 PROCEDURE — G0009 ADMIN PNEUMOCOCCAL VACCINE: HCPCS

## 2022-01-17 RX ORDER — VENLAFAXINE HYDROCHLORIDE 75 MG/1
CAPSULE, EXTENDED RELEASE ORAL
Qty: 90 CAPSULE | Refills: 3 | Status: SHIPPED | OUTPATIENT
Start: 2022-01-17 | End: 2023-03-21

## 2022-01-17 RX ORDER — PENICILLIN V POTASSIUM 500 MG/1
TABLET, FILM COATED ORAL
COMMUNITY
Start: 2022-01-10 | End: 2022-03-16

## 2022-01-17 ASSESSMENT — ANXIETY QUESTIONNAIRES
6. BECOMING EASILY ANNOYED OR IRRITABLE: NOT AT ALL
GAD7 TOTAL SCORE: 0
GAD7 TOTAL SCORE: 0
1. FEELING NERVOUS, ANXIOUS, OR ON EDGE: NOT AT ALL
1. FEELING NERVOUS, ANXIOUS, OR ON EDGE: NOT AT ALL
4. TROUBLE RELAXING: NOT AT ALL
IF YOU CHECKED OFF ANY PROBLEMS ON THIS QUESTIONNAIRE, HOW DIFFICULT HAVE THESE PROBLEMS MADE IT FOR YOU TO DO YOUR WORK, TAKE CARE OF THINGS AT HOME, OR GET ALONG WITH OTHER PEOPLE: NOT DIFFICULT AT ALL
5. BEING SO RESTLESS THAT IT IS HARD TO SIT STILL: NOT AT ALL
7. FEELING AFRAID AS IF SOMETHING AWFUL MIGHT HAPPEN: NOT AT ALL
3. WORRYING TOO MUCH ABOUT DIFFERENT THINGS: NOT AT ALL
2. NOT BEING ABLE TO STOP OR CONTROL WORRYING: NOT AT ALL
7. FEELING AFRAID AS IF SOMETHING AWFUL MIGHT HAPPEN: NOT AT ALL
5. BEING SO RESTLESS THAT IT IS HARD TO SIT STILL: NOT AT ALL
4. TROUBLE RELAXING: NOT AT ALL
2. NOT BEING ABLE TO STOP OR CONTROL WORRYING: NOT AT ALL
3. WORRYING TOO MUCH ABOUT DIFFERENT THINGS: NOT AT ALL
6. BECOMING EASILY ANNOYED OR IRRITABLE: NOT AT ALL

## 2022-01-17 ASSESSMENT — MIFFLIN-ST. JEOR: SCORE: 1197.25

## 2022-01-17 ASSESSMENT — ACTIVITIES OF DAILY LIVING (ADL): CURRENT_FUNCTION: NO ASSISTANCE NEEDED

## 2022-01-17 ASSESSMENT — PATIENT HEALTH QUESTIONNAIRE - PHQ9: SUM OF ALL RESPONSES TO PHQ QUESTIONS 1-9: 0

## 2022-01-17 ASSESSMENT — PAIN SCALES - GENERAL: PAINLEVEL: NO PAIN (0)

## 2022-01-17 NOTE — PROGRESS NOTES
"  SUBJECTIVE:   Idalia Hoang is a 66 year old female who presents for Preventive Visit.      Patient has been advised of split billing requirements and indicates understanding: Yes   Are you in the first 12 months of your Medicare coverage?  No    Healthy Habits:     In general, how would you rate your overall health?  Good    Frequency of exercise:  2-3 days/week    Duration of exercise:  45-60 minutes    Do you usually eat at least 4 servings of fruit and vegetables a day, include whole grains    & fiber and avoid regularly eating high fat or \"junk\" foods?  No    Taking medications regularly:  Yes    Barriers to taking medications:  None    Medication side effects:  None    Ability to successfully perform activities of daily living:  No assistance needed    Home Safety:  No safety concerns identified    Hearing Impairment:  No hearing concerns    In the past 6 months, have you been bothered by leaking of urine?  No    In general, how would you rate your overall mental or emotional health?  Good      PHQ-2 Total Score: 0    Additional concerns today:  Yes    Do you feel safe in your environment? Yes    Have you ever done Advance Care Planning? (For example, a Health Directive, POLST, or a discussion with a medical provider or your loved ones about your wishes): No, advance care planning information given to patient to review.  Patient declined advance care planning discussion at this time.       Fall risk  Fallen 2 or more times in the past year?: No  Any fall with injury in the past year?: No    Cognitive Screening   1) Repeat 3 items (Leader, Season, Table)    2) Clock draw: NORMAL  3) 3 item recall: Recalls 3 objects  Results: 3 items recalled: COGNITIVE IMPAIRMENT LESS LIKELY    Mini-CogTM Copyright THANIA Daily. Licensed by the author for use in [facility]; reprinted with permission (tim@.[facility]). All rights reserved.      Do you have sleep apnea, excessive snoring or daytime drowsiness?: " no    Reviewed and updated as needed this visit by clinical staff  Tobacco  Allergies  Meds  Problems  Med Hx  Surg Hx  Fam Hx         Reviewed and updated as needed this visit by Provider   Allergies  Meds  Problems           Social History     Tobacco Use     Smoking status: Former Smoker     Packs/day: 0.30     Years: 1.00     Pack years: 0.30     Types: Cigarettes     Smokeless tobacco: Never Used     Tobacco comment: quit 1989; no passive exposure   Substance Use Topics     Alcohol use: Yes     Comment: 1 drink beer and wine daily         Alcohol Use 1/17/2022   Prescreen: >3 drinks/day or >7 drinks/week? No   Prescreen: >3 drinks/day or >7 drinks/week? -               Current providers sharing in care for this patient include:   Patient Care Team:  Love Noriega PA as PCP - General (Family Practice)  Yariel Leger DPM (Podiatry)  Alonso Rivera MD as MD (Dermapathology)  Dawson Silver MD as MD (Dermatology)  Jameson Thompson MD (Ophthalmology)  Gera Tsang OD as MD (Optometry)  Love Noriega PA as Assigned PCP    The following health maintenance items are reviewed in Epic and correct as of today:  Health Maintenance Due   Topic Date Due     ZOSTER IMMUNIZATION (2 of 3) 06/09/2014     LIPID  12/10/2020     COVID-19 Vaccine (3 - Booster for Moderna series) 08/04/2021     MAMMO SCREENING  12/23/2021     TSH W/FREE T4 REFLEX  01/27/2022     Lab work is in process  Labs reviewed in EPIC  BP Readings from Last 3 Encounters:   01/17/22 120/70   11/22/19 120/82   07/01/19 104/72    Wt Readings from Last 3 Encounters:   01/17/22 68.3 kg (150 lb 9.6 oz)   04/14/20 70.3 kg (155 lb)   11/22/19 70.8 kg (156 lb)                  Patient Active Problem List   Diagnosis     Advanced care planning/counseling discussion     Lichen simplex chronicus     Acquired hypothyroidism     Estradiol deficiency     Mixed hyperlipidemia     ACP (advance care planning)     Family history  of melanoma     Past Surgical History:   Procedure Laterality Date     COLONOSCOPY  04/20/2012     surgical removal  2010    tendon nodule; free of disease       Social History     Tobacco Use     Smoking status: Former Smoker     Packs/day: 0.30     Years: 1.00     Pack years: 0.30     Types: Cigarettes     Smokeless tobacco: Never Used     Tobacco comment: quit 1989; no passive exposure   Substance Use Topics     Alcohol use: Yes     Comment: 1 drink beer and wine daily     Family History   Problem Relation Age of Onset     Hypertension Sister      Melanoma Sister      C.A.D. Other         grandmother     Cancer Other         lung; grandmother/leukemia; grandfather     Depression Other         family h/o     Hypertension Other         grandmother     Breast Cancer Sister      Other - See Comments Mother         memory loss/sjogrensyndrome/stomach     Osteoporosis Mother      Alzheimer Disease Mother      Hyperlipidemia Sister      Diabetes Father      Coronary Artery Disease Father         a fib     Thyroid Disease Sister      Asthma Brother          Current Outpatient Medications   Medication Sig Dispense Refill     fluocinonide (LIDEX) 0.05 % external cream Apply topically 2 times daily 60 g 1     levothyroxine (SYNTHROID/LEVOTHROID) 88 MCG tablet Take 1 tablet (88 mcg) by mouth daily 90 tablet 1     penicillin V (VEETID) 500 MG tablet TAKE 2 TABLETS BY MOUTH NOW AND THEN 1 EVERY 8 HOURS UNTIL GONE       Urea 40 % CREA APPLY  CREAM EXTERNALLY TO AFFECTED AREA TWICE DAILY ON  THICK  SCALING  ON  HAND  AND  FEET--CAN  COVER  WITH  GLOVE  OR  SOCK  AT  BEDTIM 199 g 3     venlafaxine (EFFEXOR-XR) 75 MG 24 hr capsule Take one by mouth per day. 90 capsule 3     No Known Allergies  Recent Labs   Lab Test 01/27/21  1443 12/10/19  1207 11/28/18  0740 11/06/17  1035   LDL  --  149* 151* 167*   HDL  --  64 63 71   TRIG  --  148 134 113   ALT 22 26 18  --    CR 0.88 0.87 0.67  --    GFRESTIMATED 69 71 89  --    GFRESTBLACK  "81 82 >90  --    POTASSIUM 3.7 4.4 4.1  --    TSH 0.82 1.44 0.55  --       Pneumonia Vaccine:Adults age 65+ who received Pneumovax (PPSV23) at 65 years or older: Should be given PCV13 > 1 year after their most recent PPSV23  Mammogram Screening: Mammogram Screening: Recommended mammography every 1-2 years with patient discussion and risk factor consideration  Last 3 Pap and HPV Results:   PAP / HPV Latest Ref Rng & Units 10/23/2017 4/14/2014   PAP (Historical) - NIL NIL   HPV16 NEG:Negative Negative -   HPV18 NEG:Negative Negative -   HRHPV NEG:Negative Negative -     Any new diagnosis of family breast, ovarian, or bowel cancer? No    FHS-7: No flowsheet data found.    Mammogram Screening: Recommended mammography every 1-2 years with patient discussion and risk factor consideration  Pertinent mammograms are reviewed under the imaging tab.    Review of Systems  CONSTITUTIONAL: NEGATIVE for fever, chills, change in weight  INTEGUMENTARY/SKIN: NEGATIVE for worrisome rashes, moles or lesions  EYES: NEGATIVE for vision changes or irritation  ENT/MOUTH: NEGATIVE for ear, mouth and throat problems  RESP: NEGATIVE for significant cough or SOB  BREAST: NEGATIVE for masses, tenderness or discharge  CV: NEGATIVE for chest pain, palpitations or peripheral edema  GI: NEGATIVE for nausea, abdominal pain, heartburn, or change in bowel habits  : NEGATIVE for frequency, dysuria, or hematuria  MUSCULOSKELETAL: NEGATIVE for significant arthralgias or myalgia  NEURO: NEGATIVE for weakness, dizziness or paresthesias  ENDOCRINE: NEGATIVE for temperature intolerance, skin/hair changes  HEME: NEGATIVE for bleeding problems  PSYCHIATRIC: fatigue and feelings of worthlessness/guilt    OBJECTIVE:   /70 (BP Location: Left arm, Patient Position: Sitting, Cuff Size: Adult Regular)   Pulse 74   Temp 98.4  F (36.9  C) (Tympanic)   Ht 1.6 m (5' 3\")   Wt 68.3 kg (150 lb 9.6 oz)   SpO2 98%   BMI 26.68 kg/m   Estimated body mass index is " "26.68 kg/m  as calculated from the following:    Height as of this encounter: 1.6 m (5' 3\").    Weight as of this encounter: 68.3 kg (150 lb 9.6 oz).  Physical Exam  GENERAL: healthy, alert and no distress  EYES: Eyes grossly normal to inspection, PERRL and conjunctivae and sclerae normal  HENT: ear canals and TM's normal, nose and mouth without ulcers or lesions  NECK: no adenopathy, no asymmetry, masses, or scars and thyroid normal to palpation  RESP: lungs clear to auscultation - no rales, rhonchi or wheezes  CV: regular rate and rhythm, normal S1 S2, no S3 or S4, no murmur, click or rub, no peripheral edema and peripheral pulses strong  ABDOMEN: soft, nontender, no hepatosplenomegaly, no masses and bowel sounds normal  MS: no gross musculoskeletal defects noted, no edema  SKIN: no suspicious lesions or rashes  NEURO: Normal strength and tone, mentation intact and speech normal  BACK: no CVA tenderness, no paralumbar tenderness  PSYCH: mentation appears normal, affect normal/bright  LYMPH: no cervical, supraclavicular, axillary, or inguinal adenopathy    Diagnostic Test Results:  Labs reviewed in Epic  No results found for this or any previous visit (from the past 24 hour(s)).    ASSESSMENT / PLAN:   (Z78.0) Asymptomatic postmenopausal estrogen deficiency  (primary encounter diagnosis)  Comment: needing her dexa done.  Will start her calcium and check her levels.   Plan: DX Hip/Pelvis/Spine            (Z12.31) Encounter for screening mammogram for breast cancer  Comment: due for this. We will have her get a mammogram.   Plan: MA Diagnostic Bilateral w/Thaddeus            (Z00.00) Routine general medical examination at a health care facility  Comment: she is on Medicare for thei first year.  Doesn't have a primarily that covers her physical.  We will add in her labs and schedule these.  She is getting her immunizations up to date. She will complete her Welcome to Medicare exam today. Risks are low and continues to " work and be quiet active. No cognitive issues.   Plan: see us back in 1 year.     (F33.9) Episode of recurrent major depressive disorder, unspecified depression episode severity (H)  Comment: her mood is very good. Working good life balance except she is tired and falls asleep at 7 at night.  Admits to THC might be trigger but finds the fatigue more than just that. Doesn't meet criteria for Covid screening.  Doesn't look sick , it could be asymptomatic and only fatigue but would expect that to improve over time.  We will recheck on this in a month if still problematic and her labs are all good will have to dig deeper.   Plan: venlafaxine (EFFEXOR-XR) 75 MG 24 hr capsule        She is going to be given medications .      (Z23) Need for vaccination  Comment: given below up to date on others.   Plan: INFLUENZA, QUAD, HIGH DOSE, PF, 65YR + (FLUZONE        HD), PPSV23, IM/SUBQ (2+ YRS) - [id number]        Due for a recheck in 3 months.     (E03.9) Acquired hypothyroidism  Comment: she is going to be having labs done.  Will schedule in the future.   Plan: Lipid Profile (Chol, Trig, HDL, LDL calc), TSH         with free T4 reflex, Comprehensive metabolic         panel (BMP + Alb, Alk Phos, ALT, AST, Total.         Bili, TP)            (R53.83) Other fatigue  Comment: new for her . Falling to sleep in the middle of of afternoon wakes up in the morning.   Plan: TSH with free T4 reflex, Comprehensive         metabolic panel (BMP + Alb, Alk Phos, ALT, AST,        Total. Bili, TP), CBC with platelets and         differential, Vitamin B12            (Z12.31) Visit for screening mammogram  Comment: due for a follow up mammogram had one last year    Plan: MA SCREENING DIGITAL BILATERAL ([place])              Patient has been advised of split billing requirements and indicates understanding: Yes  COUNSELING:  Reviewed preventive health counseling, as reflected in patient instructions       Regular exercise       Healthy  "diet/nutrition       Vision screening       Dental care       Immunizations    Vaccinated for: Influenza and Pneumococcal             Osteoporosis prevention/bone health       Safe sex practices/STD prevention       Colon cancer screening       Advanced Planning     Estimated body mass index is 26.68 kg/m  as calculated from the following:    Height as of this encounter: 1.6 m (5' 3\").    Weight as of this encounter: 68.3 kg (150 lb 9.6 oz).    Weight management plan: Discussed healthy diet and exercise guidelines    She reports that she has quit smoking. Her smoking use included cigarettes. She has a 0.30 pack-year smoking history. She has never used smokeless tobacco.      Appropriate preventive services were discussed with this patient, including applicable screening as appropriate for cardiovascular disease, diabetes, osteopenia/osteoporosis, and glaucoma.  As appropriate for age/gender, discussed screening for colorectal cancer, prostate cancer, breast cancer, and cervical cancer. Checklist reviewing preventive services available has been given to the patient.    Reviewed patients plan of care and provided an AVS. The Intermediate Care Plan ( asthma action plan, low back pain action plan, and migraine action plan) for Idalia meets the Care Plan requirement. This Care Plan has been established and reviewed with the Patient.    Counseling Resources:  ATP IV Guidelines  Pooled Cohorts Equation Calculator  Breast Cancer Risk Calculator  Breast Cancer: Medication to Reduce Risk  FRAX Risk Assessment  ICSI Preventive Guidelines  Dietary Guidelines for Americans, 2010  USDA's MyPlate  ASA Prophylaxis  Lung CA Screening    MARCOS John  [facility] [place]    Identified Health Risks:    "

## 2022-01-17 NOTE — PATIENT INSTRUCTIONS
Preventive Health Recommendations    See your health care provider every year to    Review health changes.     Discuss preventive care.      Review your medicines if your doctor has prescribed any.      You no longer need a yearly Pap test unless you've had an abnormal Pap test in the past 10 years. If you have vaginal symptoms, such as bleeding or discharge, be sure to talk with your provider about a Pap test.      Every 1 to 2 years, have a mammogram.  If you are over 69, talk with your health care provider about whether or not you want to continue having screening mammograms.      Every 10 years, have a colonoscopy. Or, have a yearly FIT test (stool test). These exams will check for colon cancer.       Have a cholesterol test every 5 years, or more often if your doctor advises it.       Have a diabetes test (fasting glucose) every three years. If you are at risk for diabetes, you should have this test more often.       At age 65, have a bone density scan (DEXA) to check for osteoporosis (brittle bone disease).    Shots:    Get a flu shot each year.    Get a tetanus shot every 10 years.    Talk to your doctor about your pneumonia vaccines. There are now two you should receive - Pneumovax (PPSV 23) and Prevnar (PCV 13).    Talk to your pharmacist about the shingles vaccine.    Talk to your doctor about the hepatitis B vaccine.    Nutrition:     Eat at least 5 servings of fruits and vegetables each day.      Eat whole-grain bread, whole-wheat pasta and brown rice instead of white grains and rice.      Get adequate about Calcium and Vitamin D.     Lifestyle    Exercise at least 150 minutes a week (30 minutes a day, 5 days a week). This will help you control your weight and prevent disease.      Limit alcohol to one drink per day.      No smoking.       Wear sunscreen to prevent skin cancer.       See your dentist twice a year for an exam and cleaning.      See your eye doctor every 1 to 2 years to screen for  conditions such as glaucoma, macular degeneration, cataracts, etc.    Personalized Prevention Plan  You are due for the preventive services outlined below.  Your care team is available to assist you in scheduling these services.  If you have already completed any of these items, please share that information with your care team to update in your medical record.    Health Maintenance Due   Topic Date Due     Depression Action Plan  Never done     LUNG CANCER SCREENING  Never done     Zoster (Shingles) Vaccine (2 of 3) 06/09/2014     Anxiety Assessment  05/22/2020     Depression Assessment  05/22/2020     Cholesterol Lab  12/10/2020     Discuss Advance Care Planning  04/25/2021     COVID-19 Vaccine (3 - Booster for Moderna series) 08/04/2021     Flu Vaccine (1) 09/01/2021     Annual Wellness Visit  12/05/2021     FALL RISK ASSESSMENT  12/05/2021     Pneumococcal Vaccine (1 of 1 - PPSV23) 12/05/2021     Mammogram  12/23/2021     Thyroid Function Lab  01/27/2022     Preventive Health Recommendations    See your health care provider every year to    Review health changes.     Discuss preventive care.      Review your medicines if your doctor has prescribed any.      You no longer need a yearly Pap test unless you've had an abnormal Pap test in the past 10 years. If you have vaginal symptoms, such as bleeding or discharge, be sure to talk with your provider about a Pap test.      Every 1 to 2 years, have a mammogram.  If you are over 69, talk with your health care provider about whether or not you want to continue having screening mammograms.      Every 10 years, have a colonoscopy. Or, have a yearly FIT test (stool test). These exams will check for colon cancer.       Have a cholesterol test every 5 years, or more often if your doctor advises it.       Have a diabetes test (fasting glucose) every three years. If you are at risk for diabetes, you should have this test more often.       At age 65, have a bone density scan  (DEXA) to check for osteoporosis (brittle bone disease).    Shots:    Get a flu shot each year.    Get a tetanus shot every 10 years.    Talk to your doctor about your pneumonia vaccines. There are now two you should receive - Pneumovax (PPSV 23) and Prevnar (PCV 13).    Talk to your pharmacist about the shingles vaccine.    Talk to your doctor about the hepatitis B vaccine.    Nutrition:     Eat at least 5 servings of fruits and vegetables each day.      Eat whole-grain bread, whole-wheat pasta and brown rice instead of white grains and rice.      Get adequate about Calcium and Vitamin D.     Lifestyle    Exercise at least 150 minutes a week (30 minutes a day, 5 days a week). This will help you control your weight and prevent disease.      Limit alcohol to one drink per day.      No smoking.       Wear sunscreen to prevent skin cancer.       See your dentist twice a year for an exam and cleaning.      See your eye doctor every 1 to 2 years to screen for conditions such as glaucoma, macular degeneration, cataracts, etc.    Personalized Prevention Plan  You are due for the preventive services outlined below.  Your care team is available to assist you in scheduling these services.  If you have already completed any of these items, please share that information with your care team to update in your medical record.    Health Maintenance Due   Topic Date Due     Zoster (Shingles) Vaccine (2 of 3) 06/09/2014     Cholesterol Lab  12/10/2020     COVID-19 Vaccine (3 - Booster for Moderna series) 08/04/2021     Flu Vaccine (1) 09/01/2021     Pneumococcal Vaccine (1 of 1 - PPSV23) 12/05/2021     Mammogram  12/23/2021     Thyroid Function Lab  01/27/2022

## 2022-01-17 NOTE — NURSING NOTE
"Chief Complaint   Patient presents with     Physical       Initial /70 (BP Location: Left arm, Patient Position: Sitting, Cuff Size: Adult Regular)   Pulse 74   Temp 98.4  F (36.9  C) (Tympanic)   Ht 1.6 m (5' 3\")   Wt 68.3 kg (150 lb 9.6 oz)   SpO2 98%   BMI 26.68 kg/m   Estimated body mass index is 26.68 kg/m  as calculated from the following:    Height as of this encounter: 1.6 m (5' 3\").    Weight as of this encounter: 68.3 kg (150 lb 9.6 oz).  Medication Reconciliation: complete  Soledad Orosco LPN  "

## 2022-01-18 ENCOUNTER — LAB (OUTPATIENT)
Dept: LAB | Facility: OTHER | Age: 67
End: 2022-01-18
Payer: COMMERCIAL

## 2022-01-18 DIAGNOSIS — E03.9 ACQUIRED HYPOTHYROIDISM: ICD-10-CM

## 2022-01-18 DIAGNOSIS — R53.83 OTHER FATIGUE: ICD-10-CM

## 2022-01-18 DIAGNOSIS — Z12.31 ENCOUNTER FOR SCREENING MAMMOGRAM FOR BREAST CANCER: ICD-10-CM

## 2022-01-18 LAB
ALBUMIN SERPL-MCNC: 3.9 G/DL (ref 3.4–5)
ALP SERPL-CCNC: 63 U/L (ref 40–150)
ALT SERPL W P-5'-P-CCNC: 23 U/L (ref 0–50)
ANION GAP SERPL CALCULATED.3IONS-SCNC: 4 MMOL/L (ref 3–14)
AST SERPL W P-5'-P-CCNC: 17 U/L (ref 0–45)
BASOPHILS # BLD AUTO: 0 10E3/UL (ref 0–0.2)
BASOPHILS NFR BLD AUTO: 0 %
BILIRUB SERPL-MCNC: 0.3 MG/DL (ref 0.2–1.3)
BUN SERPL-MCNC: 17 MG/DL (ref 7–30)
CALCIUM SERPL-MCNC: 9 MG/DL (ref 8.5–10.1)
CHLORIDE BLD-SCNC: 109 MMOL/L (ref 94–109)
CHOLEST SERPL-MCNC: 258 MG/DL
CO2 SERPL-SCNC: 25 MMOL/L (ref 20–32)
CREAT SERPL-MCNC: 0.82 MG/DL (ref 0.52–1.04)
EOSINOPHIL # BLD AUTO: 0.4 10E3/UL (ref 0–0.7)
EOSINOPHIL NFR BLD AUTO: 5 %
ERYTHROCYTE [DISTWIDTH] IN BLOOD BY AUTOMATED COUNT: 13.6 % (ref 10–15)
FASTING STATUS PATIENT QL REPORTED: YES
GFR SERPL CREATININE-BSD FRML MDRD: 79 ML/MIN/1.73M2
GLUCOSE BLD-MCNC: 99 MG/DL (ref 70–99)
HCT VFR BLD AUTO: 40.3 % (ref 35–47)
HDLC SERPL-MCNC: 58 MG/DL
HGB BLD-MCNC: 13.4 G/DL (ref 11.7–15.7)
IMM GRANULOCYTES # BLD: 0 10E3/UL
IMM GRANULOCYTES NFR BLD: 0 %
LDLC SERPL CALC-MCNC: 166 MG/DL
LYMPHOCYTES # BLD AUTO: 1.6 10E3/UL (ref 0.8–5.3)
LYMPHOCYTES NFR BLD AUTO: 20 %
MCH RBC QN AUTO: 32.3 PG (ref 26.5–33)
MCHC RBC AUTO-ENTMCNC: 33.3 G/DL (ref 31.5–36.5)
MCV RBC AUTO: 97 FL (ref 78–100)
MONOCYTES # BLD AUTO: 0.5 10E3/UL (ref 0–1.3)
MONOCYTES NFR BLD AUTO: 6 %
NEUTROPHILS # BLD AUTO: 5.6 10E3/UL (ref 1.6–8.3)
NEUTROPHILS NFR BLD AUTO: 69 %
NONHDLC SERPL-MCNC: 200 MG/DL
NRBC # BLD AUTO: 0 10E3/UL
NRBC BLD AUTO-RTO: 0 /100
PLATELET # BLD AUTO: 308 10E3/UL (ref 150–450)
POTASSIUM BLD-SCNC: 3.9 MMOL/L (ref 3.4–5.3)
PROT SERPL-MCNC: 7 G/DL (ref 6.8–8.8)
RBC # BLD AUTO: 4.15 10E6/UL (ref 3.8–5.2)
SODIUM SERPL-SCNC: 138 MMOL/L (ref 133–144)
TRIGL SERPL-MCNC: 172 MG/DL
TSH SERPL DL<=0.005 MIU/L-ACNC: 2.45 MU/L (ref 0.4–4)
VIT B12 SERPL-MCNC: 506 PG/ML (ref 193–986)
WBC # BLD AUTO: 8.1 10E3/UL (ref 4–11)

## 2022-01-18 PROCEDURE — 82040 ASSAY OF SERUM ALBUMIN: CPT | Mod: ZL

## 2022-01-18 PROCEDURE — 80053 COMPREHEN METABOLIC PANEL: CPT | Mod: ZL

## 2022-01-18 PROCEDURE — 80061 LIPID PANEL: CPT | Mod: ZL

## 2022-01-18 PROCEDURE — 84443 ASSAY THYROID STIM HORMONE: CPT | Mod: ZL

## 2022-01-18 PROCEDURE — 82607 VITAMIN B-12: CPT | Mod: ZL

## 2022-01-18 PROCEDURE — 36415 COLL VENOUS BLD VENIPUNCTURE: CPT | Mod: ZL

## 2022-01-18 PROCEDURE — 85025 COMPLETE CBC W/AUTO DIFF WBC: CPT | Mod: ZL

## 2022-01-18 ASSESSMENT — ANXIETY QUESTIONNAIRES: GAD7 TOTAL SCORE: 0

## 2022-02-02 ENCOUNTER — TELEPHONE (OUTPATIENT)
Dept: FAMILY MEDICINE | Facility: OTHER | Age: 67
End: 2022-02-02
Payer: COMMERCIAL

## 2022-02-09 DIAGNOSIS — E03.9 ACQUIRED HYPOTHYROIDISM: ICD-10-CM

## 2022-02-10 RX ORDER — LEVOTHYROXINE SODIUM 88 UG/1
TABLET ORAL
Qty: 90 TABLET | Refills: 2 | Status: SHIPPED | OUTPATIENT
Start: 2022-02-10 | End: 2022-12-20

## 2022-02-23 NOTE — TELEPHONE ENCOUNTER
Left message to make an appt before next refill due.  Sarita Jeff LPN     Mailing lab orders to patient today     Em lpn

## 2022-02-26 ENCOUNTER — HEALTH MAINTENANCE LETTER (OUTPATIENT)
Age: 67
End: 2022-02-26

## 2022-02-28 ENCOUNTER — ANCILLARY PROCEDURE (OUTPATIENT)
Dept: MAMMOGRAPHY | Facility: OTHER | Age: 67
End: 2022-02-28
Attending: PHYSICIAN ASSISTANT
Payer: COMMERCIAL

## 2022-02-28 ENCOUNTER — HOSPITAL ENCOUNTER (OUTPATIENT)
Dept: BONE DENSITY | Facility: HOSPITAL | Age: 67
End: 2022-02-28
Attending: PHYSICIAN ASSISTANT
Payer: COMMERCIAL

## 2022-02-28 DIAGNOSIS — Z12.31 VISIT FOR SCREENING MAMMOGRAM: ICD-10-CM

## 2022-02-28 DIAGNOSIS — Z78.0 ASYMPTOMATIC POSTMENOPAUSAL ESTROGEN DEFICIENCY: ICD-10-CM

## 2022-02-28 PROCEDURE — 77067 SCR MAMMO BI INCL CAD: CPT | Mod: TC

## 2022-02-28 PROCEDURE — 77080 DXA BONE DENSITY AXIAL: CPT

## 2022-03-01 ENCOUNTER — TELEPHONE (OUTPATIENT)
Dept: MAMMOGRAPHY | Facility: OTHER | Age: 67
End: 2022-03-01
Payer: COMMERCIAL

## 2022-03-16 ENCOUNTER — OFFICE VISIT (OUTPATIENT)
Dept: FAMILY MEDICINE | Facility: OTHER | Age: 67
End: 2022-03-16
Attending: PHYSICIAN ASSISTANT
Payer: COMMERCIAL

## 2022-03-16 VITALS
TEMPERATURE: 98.4 F | WEIGHT: 142 LBS | OXYGEN SATURATION: 96 % | HEIGHT: 63 IN | HEART RATE: 76 BPM | SYSTOLIC BLOOD PRESSURE: 124 MMHG | DIASTOLIC BLOOD PRESSURE: 74 MMHG | BODY MASS INDEX: 25.16 KG/M2

## 2022-03-16 DIAGNOSIS — E53.8 VITAMIN B12 DEFICIENCY (NON ANEMIC): ICD-10-CM

## 2022-03-16 DIAGNOSIS — E78.5 HYPERLIPIDEMIA LDL GOAL <100: ICD-10-CM

## 2022-03-16 DIAGNOSIS — Z01.818 PREOP GENERAL PHYSICAL EXAM: Primary | ICD-10-CM

## 2022-03-16 PROCEDURE — 99214 OFFICE O/P EST MOD 30 MIN: CPT | Performed by: PHYSICIAN ASSISTANT

## 2022-03-16 PROCEDURE — G0463 HOSPITAL OUTPT CLINIC VISIT: HCPCS

## 2022-03-16 PROCEDURE — G0463 HOSPITAL OUTPT CLINIC VISIT: HCPCS | Mod: 25

## 2022-03-16 PROCEDURE — 96372 THER/PROPH/DIAG INJ SC/IM: CPT

## 2022-03-16 RX ORDER — CYANOCOBALAMIN 1000 UG/ML
1000 INJECTION, SOLUTION INTRAMUSCULAR; SUBCUTANEOUS
Status: ACTIVE | OUTPATIENT
Start: 2022-03-16 | End: 2023-03-11

## 2022-03-16 RX ORDER — PREDNISOLONE ACETATE 10 MG/ML
SUSPENSION/ DROPS OPHTHALMIC
COMMUNITY
Start: 2022-02-17 | End: 2022-08-16

## 2022-03-16 RX ORDER — KETOROLAC TROMETHAMINE 5 MG/ML
SOLUTION OPHTHALMIC
COMMUNITY
Start: 2022-02-17 | End: 2022-08-16

## 2022-03-16 RX ORDER — MOXIFLOXACIN 5 MG/ML
SOLUTION/ DROPS OPHTHALMIC
COMMUNITY
Start: 2022-02-17 | End: 2022-08-16

## 2022-03-16 RX ADMIN — CYANOCOBALAMIN 1000 MCG: 1000 INJECTION INTRAMUSCULAR; SUBCUTANEOUS at 16:52

## 2022-03-16 ASSESSMENT — PAIN SCALES - GENERAL: PAINLEVEL: NO PAIN (0)

## 2022-03-16 NOTE — NURSING NOTE
Clinic Administered Medication Documentation          Injectable Medication Documentation    Patient was given Cyanocobalamin (B-12). Prior to medication administration, verified patients identity using patient s name and date of birth. Please see MAR and medication order for additional information. Patient instructed to remain in clinic for 15 minutes, report any adverse reaction to staff immediately  and stay in clinic after the injection but patient declined.      Was entire vial of medication used? Yes  Vial/Syringe: Single dose vial  Expiration Date:  06/16/2023  Was this medication supplied by the patient? No   Soledad Orosco LPN

## 2022-03-16 NOTE — NURSING NOTE
"Chief Complaint   Patient presents with     Pre-Op Exam     Right eye cataract 3/29/2022 [facility] [place]; Left eye cataract [place] [facility] [place]       Initial /74 (BP Location: Left arm, Patient Position: Sitting, Cuff Size: Adult Regular)   Pulse 76   Temp 98.4  F (36.9  C) (Tympanic)   Ht 1.6 m (5' 3\")   Wt 64.4 kg (142 lb)   SpO2 96%   BMI 25.15 kg/m   Estimated body mass index is 25.15 kg/m  as calculated from the following:    Height as of this encounter: 1.6 m (5' 3\").    Weight as of this encounter: 64.4 kg (142 lb).  Medication Reconciliation: complete  Soledad Orosco LPN  "

## 2022-03-16 NOTE — PROGRESS NOTES
[facility] [place]  [address]  [place] [address]  Phone: [phone]  Primary Provider: Love Noriega        PREOPERATIVE EVALUATION:  Today's date: 3/16/2022    Idalia Hoang is a 66 year old female who presents for a preoperative evaluation.    Surgical Information:  Surgery/Procedure: Right Eye cataract 3/29/2022 Left Eye Cataract 4/12/2022  Surgery Location:[facility]; 4/12/2022 [facility] [place]  Surgeon: Dr. Crawford  Surgery Date: 3/29/22, 4/12/2022  Time of Surgery: tbd  Where patient plans to recover: At home with family  Fax number for surgical facility: [facility], [facility] [place]    Type of Anesthesia Anticipated: to be determined    Assessment & Plan     The proposed surgical procedure is considered LOW risk.    There are no diagnoses linked to this encounter.     1. Preop general physical exam  She is optimized. No concerns     2. Hyperlipidemia LDL goal <100  She is going to be given co Q .     - coenzyme Q10 (CO-Q10) 30 MG capsule; Take 1 capsule (30 mg) by mouth daily  Dispense: 90 capsule; Refill: 1        Risks and Recommendations:  The patient has the following additional risks and recommendations for perioperative complications:   - No identified additional risk factors other than previously addressed    Medication Instructions:  Patient is on no chronic medications    RECOMMENDATION:  APPROVAL GIVEN to proceed with proposed procedure, without further diagnostic evaluation.    Review of external notes as documented above           10  minutes spent on the date of the encounter doing chart review, review of outside records, patient visit and documentation         Subjective     HPI related to upcoming procedure: has bilateral cataracts, will be having replacement of her right eye and then replacement of her left eye 2 weeks later.       Preop Questions 3/16/2022   1. Have you ever had a heart attack or stroke? No   2. Have you ever had surgery on your heart  or blood vessels, such as a stent placement, a coronary artery bypass, or surgery on an artery in your head, neck, heart, or legs? No   3. Do you have chest pain with activity? No   4. Do you have a history of  heart failure? No   5. Do you currently have a cold, bronchitis or symptoms of other infection? No   6. Do you have a cough, shortness of breath, or wheezing? No   7. Do you or anyone in your family have previous history of blood clots? No   8. Do you or does anyone in your family have a serious bleeding problem such as prolonged bleeding following surgeries or cuts? No   9. Have you ever had problems with anemia or been told to take iron pills? No   10. Have you had any abnormal blood loss such as black, tarry or bloody stools, or abnormal vaginal bleeding? No   11. Have you ever had a blood transfusion? No   12. Are you willing to have a blood transfusion if it is medically needed before, during, or after your surgery? Yes   13. Have you or any of your relatives ever had problems with anesthesia? No   14. Do you have sleep apnea, excessive snoring or daytime drowsiness? No   15. Do you have any artifical heart valves or other implanted medical devices like a pacemaker, defibrillator, or continuous glucose monitor? No   16. Do you have artificial joints? No   17. Are you allergic to latex? No     Health Care Directive:  Patient does not have a Health Care Directive or Living Will: Discussed advance care planning with patient; information given to patient to review.    Preoperative Review of :   reviewed - controlled substances reflected in medication list.      Status of Chronic Conditions:  DEPRESSION - Patient has a long history of Depression of moderate severity requiring medication for control with recent symptoms being stable..Current symptoms of depression include fatigue, anxiety.     HYPERLIPIDEMIA - Patient has a long history of significant Hyperlipidemia requiring medication for treatment  with recent fair control. Patient reports no problems or side effects with the medication.       Review of Systems  CONSTITUTIONAL: NEGATIVE for fever, chills, change in weight  INTEGUMENTARY/SKIN: NEGATIVE for worrisome rashes, moles or lesions  EYES: NEGATIVE for vision changes or irritation  ENT/MOUTH: NEGATIVE for ear, mouth and throat problems  RESP: NEGATIVE for significant cough or SOB  CV: NEGATIVE for chest pain, palpitations or peripheral edema  GI: NEGATIVE for nausea, abdominal pain, heartburn, or change in bowel habits  : NEGATIVE for frequency, dysuria, or hematuria  MUSCULOSKELETAL: NEGATIVE for significant arthralgias or myalgia  NEURO: NEGATIVE for weakness, dizziness or paresthesias  ENDOCRINE: NEGATIVE for temperature intolerance, skin/hair changes  HEME: NEGATIVE for bleeding problems  PSYCHIATRIC: NEGATIVE for changes in mood or affect    Patient Active Problem List    Diagnosis Date Noted     Family history of melanoma 02/25/2017     Priority: Medium     Acquired hypothyroidism 04/25/2016     Priority: Medium     Estradiol deficiency 04/25/2016     Priority: Medium     Mixed hyperlipidemia 04/25/2016     Priority: Medium     ACP (advance care planning) 04/25/2016     Priority: Medium     Advance Care Planning 4/25/2016: ACP Review of Chart / Resources Provided:  Reviewed chart for advance care plan.  Idalia Hoang has been provided information and resources to begin or update their advance care plan.  Added by Sarita Jeff             Lichen simplex chronicus 05/10/2015     Priority: Medium     Advanced care planning/counseling discussion 03/12/2012     Priority: Medium      Past Medical History:   Diagnosis Date     Acquired hypothyroidism 4/25/2016     Chronic serous otitis media, simple or unspecified 06/18/2012     Depression, recurrent 01/01/2011     Dysfunction of eustachian tube 06/18/2012     Estradiol deficiency 4/25/2016     Hypothyroidism 01/01/2011     Mixed hyperlipidemia  4/25/2016     Osteopenia      Past Surgical History:   Procedure Laterality Date     COLONOSCOPY  04/20/2012     surgical removal  2010    tendon nodule; free of disease     Current Outpatient Medications   Medication Sig Dispense Refill     coenzyme Q10 (CO-Q10) 30 MG capsule Take 1 capsule (30 mg) by mouth daily 90 capsule 1     EUTHYROX 88 MCG tablet Take 1 tablet by mouth once daily 90 tablet 2     fluocinonide (LIDEX) 0.05 % external cream Apply topically 2 times daily 60 g 1     Urea 40 % CREA APPLY  CREAM EXTERNALLY TO AFFECTED AREA TWICE DAILY ON  THICK  SCALING  ON  HAND  AND  FEET--CAN  COVER  WITH  GLOVE  OR  SOCK  AT  BEDTIM 199 g 3     venlafaxine (EFFEXOR-XR) 75 MG 24 hr capsule Take one by mouth per day. 90 capsule 3     ketorolac (ACULAR) 0.5 % ophthalmic solution INSTILL 1 DROP INTO RIGHT EYE 4 TIMES DAILY AS DIRECTED--START 3 DAYS BEFORE SURGERY, THEN FOUR TIMES DAILY FOR 1 WEEK, THEN THREE TIMES DAILY FOR 5 WEEKS. (Patient not taking: Reported on 3/16/2022)       moxifloxacin (VIGAMOX) 0.5 % ophthalmic solution INSTILL 1 DROP INTO RIGHT EYE 4 TIMES DAILY AS DIRECTED--START 3 DAYS BEFORE SURGERY AND CONTINUE FOR 1 WEEK AFTER SURGERY (Patient not taking: Reported on 3/16/2022)       prednisoLONE acetate (PRED FORTE) 1 % ophthalmic suspension INSTILL 1 DROP INTO RIGHT EYE 4 TIMES DAILY--START AFTER SURGERY, USE FOUR TIMES DAILY FOR 1 WEEK, THEN THREE TIMES DAILY FOR 5 WEEKS. (Patient not taking: Reported on 3/16/2022)         No Known Allergies     Social History     Tobacco Use     Smoking status: Former Smoker     Packs/day: 0.30     Years: 1.00     Pack years: 0.30     Types: Cigarettes     Smokeless tobacco: Never Used     Tobacco comment: quit 1989; no passive exposure   Substance Use Topics     Alcohol use: Yes     Comment: 1 drink beer and wine daily     Family History   Problem Relation Age of Onset     Hypertension Sister      Melanoma Sister      C.A.D. Other         grandmother     Cancer  "Other         lung; grandmother/leukemia; grandfather     Depression Other         family h/o     Hypertension Other         grandmother     Breast Cancer Sister 62     Other - See Comments Mother         memory loss/sjogrensyndrome/stomach     Osteoporosis Mother      Alzheimer Disease Mother      Hyperlipidemia Sister      Diabetes Father      Coronary Artery Disease Father         a fib     Thyroid Disease Sister      Asthma Brother      History   Drug Use No         Objective     /74 (BP Location: Left arm, Patient Position: Sitting, Cuff Size: Adult Regular)   Pulse 76   Temp 98.4  F (36.9  C) (Tympanic)   Ht 1.6 m (5' 3\")   Wt 64.4 kg (142 lb)   SpO2 96%   BMI 25.15 kg/m      Physical Exam    GENERAL APPEARANCE: healthy, alert and no distress     EYES: both eyes have cataracts.        HENT: ear canals and TM's normal and nose and mouth without ulcers or lesions     NECK: no adenopathy, no asymmetry, masses, or scars and thyroid normal to palpation     RESP: lungs clear to auscultation - no rales, rhonchi or wheezes     CV: regular rates and rhythm, normal S1 S2, no S3 or S4 and no murmur, click or rub     ABDOMEN:  soft, nontender, no HSM or masses and bowel sounds normal     MS: extremities normal- no gross deformities noted, no evidence of inflammation in joints, FROM in all extremities.     SKIN: no suspicious lesions or rashes     NEURO: Normal strength and tone, sensory exam grossly normal, mentation intact and speech normal     PSYCH: mentation appears normal. and affect normal/bright     LYMPHATICS: No cervical adenopathy    Recent Labs   Lab Test 01/18/22  0738 01/27/21  1443   HGB 13.4 13.3    264    140   POTASSIUM 3.9 3.7   CR 0.82 0.88        Diagnostics:  No results found for this or any previous visit (from the past 24 hour(s)).   No EKG required, no history of coronary heart disease, significant arrhythmia, peripheral arterial disease or other structural heart " disease.    Revised Cardiac Risk Index (RCRI):  The patient has the following serious cardiovascular risks for perioperative complications:   - No serious cardiac risks = 0 points     RCRI Interpretation: 1 point: Class II (low risk - 0.9% complication rate)           Signed Electronically by: MARCOS John  Copy of this evaluation report is provided to requesting physician.

## 2022-03-16 NOTE — H&P (VIEW-ONLY)
[facility] [place]  [address]  [place] [address]  Phone: [phone]  Primary Provider: Love Noriega        PREOPERATIVE EVALUATION:  Today's date: 3/16/2022    Idalia Hoang is a 66 year old female who presents for a preoperative evaluation.    Surgical Information:  Surgery/Procedure: Right Eye cataract 3/29/2022 Left Eye Cataract 4/12/2022  Surgery Location[place]3/29/2022 [facility] [facility] [place]geon: Dr. Crawford  Surgery Date: 3/29/22, 4/12/2022  Time of Surgery: tbd  Where patient plans to recover: At home with family  [place] facility[place] [facility] [facility] [place]    Type of Anesthesia Anticipated: to be determined    Assessment & Plan     The proposed surgical procedure is considered LOW risk.    There are no diagnoses linked to this encounter.     1. Preop general physical exam  She is optimized. No concerns     2. Hyperlipidemia LDL goal <100  She is going to be given co Q .     - coenzyme Q10 (CO-Q10) 30 MG capsule; Take 1 capsule (30 mg) by mouth daily  Dispense: 90 capsule; Refill: 1        Risks and Recommendations:  The patient has the following additional risks and recommendations for perioperative complications:   - No identified additional risk factors other than previously addressed    Medication Instructions:  Patient is on no chronic medications    RECOMMENDATION:  APPROVAL GIVEN to proceed with proposed procedure, without further diagnostic evaluation.    Review of external notes as documented above           10  minutes spent on the date of the encounter doing chart review, review of outside records, patient visit and documentation         Subjective     HPI related to upcoming procedure: has bilateral cataracts, will be having replacement of her right eye and then replacement of her left eye 2 weeks later.       Preop Questions 3/16/2022   1. Have you ever had a heart attack or stroke? No   2. Have you ever had surgery on your heart  or blood vessels, such as a stent placement, a coronary artery bypass, or surgery on an artery in your head, neck, heart, or legs? No   3. Do you have chest pain with activity? No   4. Do you have a history of  heart failure? No   5. Do you currently have a cold, bronchitis or symptoms of other infection? No   6. Do you have a cough, shortness of breath, or wheezing? No   7. Do you or anyone in your family have previous history of blood clots? No   8. Do you or does anyone in your family have a serious bleeding problem such as prolonged bleeding following surgeries or cuts? No   9. Have you ever had problems with anemia or been told to take iron pills? No   10. Have you had any abnormal blood loss such as black, tarry or bloody stools, or abnormal vaginal bleeding? No   11. Have you ever had a blood transfusion? No   12. Are you willing to have a blood transfusion if it is medically needed before, during, or after your surgery? Yes   13. Have you or any of your relatives ever had problems with anesthesia? No   14. Do you have sleep apnea, excessive snoring or daytime drowsiness? No   15. Do you have any artifical heart valves or other implanted medical devices like a pacemaker, defibrillator, or continuous glucose monitor? No   16. Do you have artificial joints? No   17. Are you allergic to latex? No     Health Care Directive:  Patient does not have a Health Care Directive or Living Will: Discussed advance care planning with patient; information given to patient to review.    Preoperative Review of :   reviewed - controlled substances reflected in medication list.      Status of Chronic Conditions:  DEPRESSION - Patient has a long history of Depression of moderate severity requiring medication for control with recent symptoms being stable..Current symptoms of depression include fatigue, anxiety.     HYPERLIPIDEMIA - Patient has a long history of significant Hyperlipidemia requiring medication for treatment  with recent fair control. Patient reports no problems or side effects with the medication.       Review of Systems  CONSTITUTIONAL: NEGATIVE for fever, chills, change in weight  INTEGUMENTARY/SKIN: NEGATIVE for worrisome rashes, moles or lesions  EYES: NEGATIVE for vision changes or irritation  ENT/MOUTH: NEGATIVE for ear, mouth and throat problems  RESP: NEGATIVE for significant cough or SOB  CV: NEGATIVE for chest pain, palpitations or peripheral edema  GI: NEGATIVE for nausea, abdominal pain, heartburn, or change in bowel habits  : NEGATIVE for frequency, dysuria, or hematuria  MUSCULOSKELETAL: NEGATIVE for significant arthralgias or myalgia  NEURO: NEGATIVE for weakness, dizziness or paresthesias  ENDOCRINE: NEGATIVE for temperature intolerance, skin/hair changes  HEME: NEGATIVE for bleeding problems  PSYCHIATRIC: NEGATIVE for changes in mood or affect    Patient Active Problem List    Diagnosis Date Noted     Family history of melanoma 02/25/2017     Priority: Medium     Acquired hypothyroidism 04/25/2016     Priority: Medium     Estradiol deficiency 04/25/2016     Priority: Medium     Mixed hyperlipidemia 04/25/2016     Priority: Medium     ACP (advance care planning) 04/25/2016     Priority: Medium     Advance Care Planning 4/25/2016: ACP Review of Chart / Resources Provided:  Reviewed chart for advance care plan.  Idalia Hoang has been provided information and resources to begin or update their advance care plan.  Added by Sarita Jeff             Lichen simplex chronicus 05/10/2015     Priority: Medium     Advanced care planning/counseling discussion 03/12/2012     Priority: Medium      Past Medical History:   Diagnosis Date     Acquired hypothyroidism 4/25/2016     Chronic serous otitis media, simple or unspecified 06/18/2012     Depression, recurrent 01/01/2011     Dysfunction of eustachian tube 06/18/2012     Estradiol deficiency 4/25/2016     Hypothyroidism 01/01/2011     Mixed hyperlipidemia  4/25/2016     Osteopenia      Past Surgical History:   Procedure Laterality Date     COLONOSCOPY  04/20/2012     surgical removal  2010    tendon nodule; free of disease     Current Outpatient Medications   Medication Sig Dispense Refill     coenzyme Q10 (CO-Q10) 30 MG capsule Take 1 capsule (30 mg) by mouth daily 90 capsule 1     EUTHYROX 88 MCG tablet Take 1 tablet by mouth once daily 90 tablet 2     fluocinonide (LIDEX) 0.05 % external cream Apply topically 2 times daily 60 g 1     Urea 40 % CREA APPLY  CREAM EXTERNALLY TO AFFECTED AREA TWICE DAILY ON  THICK  SCALING  ON  HAND  AND  FEET--CAN  COVER  WITH  GLOVE  OR  SOCK  AT  BEDTIM 199 g 3     venlafaxine (EFFEXOR-XR) 75 MG 24 hr capsule Take one by mouth per day. 90 capsule 3     ketorolac (ACULAR) 0.5 % ophthalmic solution INSTILL 1 DROP INTO RIGHT EYE 4 TIMES DAILY AS DIRECTED--START 3 DAYS BEFORE SURGERY, THEN FOUR TIMES DAILY FOR 1 WEEK, THEN THREE TIMES DAILY FOR 5 WEEKS. (Patient not taking: Reported on 3/16/2022)       moxifloxacin (VIGAMOX) 0.5 % ophthalmic solution INSTILL 1 DROP INTO RIGHT EYE 4 TIMES DAILY AS DIRECTED--START 3 DAYS BEFORE SURGERY AND CONTINUE FOR 1 WEEK AFTER SURGERY (Patient not taking: Reported on 3/16/2022)       prednisoLONE acetate (PRED FORTE) 1 % ophthalmic suspension INSTILL 1 DROP INTO RIGHT EYE 4 TIMES DAILY--START AFTER SURGERY, USE FOUR TIMES DAILY FOR 1 WEEK, THEN THREE TIMES DAILY FOR 5 WEEKS. (Patient not taking: Reported on 3/16/2022)         No Known Allergies     Social History     Tobacco Use     Smoking status: Former Smoker     Packs/day: 0.30     Years: 1.00     Pack years: 0.30     Types: Cigarettes     Smokeless tobacco: Never Used     Tobacco comment: quit 1989; no passive exposure   Substance Use Topics     Alcohol use: Yes     Comment: 1 drink beer and wine daily     Family History   Problem Relation Age of Onset     Hypertension Sister      Melanoma Sister      C.A.D. Other         grandmother     Cancer  "Other         lung; grandmother/leukemia; grandfather     Depression Other         family h/o     Hypertension Other         grandmother     Breast Cancer Sister 62     Other - See Comments Mother         memory loss/sjogrensyndrome/stomach     Osteoporosis Mother      Alzheimer Disease Mother      Hyperlipidemia Sister      Diabetes Father      Coronary Artery Disease Father         a fib     Thyroid Disease Sister      Asthma Brother      History   Drug Use No         Objective     /74 (BP Location: Left arm, Patient Position: Sitting, Cuff Size: Adult Regular)   Pulse 76   Temp 98.4  F (36.9  C) (Tympanic)   Ht 1.6 m (5' 3\")   Wt 64.4 kg (142 lb)   SpO2 96%   BMI 25.15 kg/m      Physical Exam    GENERAL APPEARANCE: healthy, alert and no distress     EYES: both eyes have cataracts.        HENT: ear canals and TM's normal and nose and mouth without ulcers or lesions     NECK: no adenopathy, no asymmetry, masses, or scars and thyroid normal to palpation     RESP: lungs clear to auscultation - no rales, rhonchi or wheezes     CV: regular rates and rhythm, normal S1 S2, no S3 or S4 and no murmur, click or rub     ABDOMEN:  soft, nontender, no HSM or masses and bowel sounds normal     MS: extremities normal- no gross deformities noted, no evidence of inflammation in joints, FROM in all extremities.     SKIN: no suspicious lesions or rashes     NEURO: Normal strength and tone, sensory exam grossly normal, mentation intact and speech normal     PSYCH: mentation appears normal. and affect normal/bright     LYMPHATICS: No cervical adenopathy    Recent Labs   Lab Test 01/18/22  0738 01/27/21  1443   HGB 13.4 13.3    264    140   POTASSIUM 3.9 3.7   CR 0.82 0.88        Diagnostics:  No results found for this or any previous visit (from the past 24 hour(s)).   No EKG required, no history of coronary heart disease, significant arrhythmia, peripheral arterial disease or other structural heart " disease.    Revised Cardiac Risk Index (RCRI):  The patient has the following serious cardiovascular risks for perioperative complications:   - No serious cardiac risks = 0 points     RCRI Interpretation: 1 point: Class II (low risk - 0.9% complication rate)           Signed Electronically by: MARCOS John  Copy of this evaluation report is provided to requesting physician.

## 2022-03-30 ENCOUNTER — TRANSFERRED RECORDS (OUTPATIENT)
Dept: HEALTH INFORMATION MANAGEMENT | Facility: HOSPITAL | Age: 67
End: 2022-03-30
Payer: COMMERCIAL

## 2022-04-04 ENCOUNTER — ANESTHESIA EVENT (OUTPATIENT)
Dept: SURGERY | Facility: HOSPITAL | Age: 67
End: 2022-04-04
Payer: COMMERCIAL

## 2022-04-04 NOTE — ANESTHESIA PREPROCEDURE EVALUATION
Anesthesia Pre-Procedure Evaluation    Patient: Idalia Hoang   MRN: [id number] : 1955        Procedure : Procedure(s):  PHACOEMULSIFICATION CATARACT EXTRACTION POSTERIOR CHAMBER LENS LEFT EYE          Past Medical History:   Diagnosis Date     Acquired hypothyroidism 2016     Chronic serous otitis media, simple or unspecified 2012     Depression, recurrent 2011     Dysfunction of eustachian tube 2012     Estradiol deficiency 2016     Hypothyroidism 2011     Mixed hyperlipidemia 2016     Osteopenia       Past Surgical History:   Procedure Laterality Date     COLONOSCOPY  2012     surgical removal  2010    tendon nodule; free of disease      No Known Allergies   Social History     Tobacco Use     Smoking status: Former Smoker     Packs/day: 0.30     Years: 1.00     Pack years: 0.30     Types: Cigarettes     Smokeless tobacco: Never Used     Tobacco comment: quit ; no passive exposure   Substance Use Topics     Alcohol use: Yes     Comment: 1 drink beer and wine daily      Wt Readings from Last 1 Encounters:   22 64.4 kg (142 lb)        Anesthesia Evaluation   Pt has had prior anesthetic. Type: General.    No history of anesthetic complications       ROS/MED HX  ENT/Pulmonary:     (+) tobacco use (THC occasional), Past use,     Neurologic:       Cardiovascular:     (+) Dyslipidemia -----    METS/Exercise Tolerance: >4 METS    Hematologic:  - neg hematologic  ROS     Musculoskeletal:  - neg musculoskeletal ROS     GI/Hepatic:  - neg GI/hepatic ROS   (+) hepatitis (has been treated and in remission) type C,     Renal/Genitourinary:  - neg Renal ROS     Endo:     (+) thyroid problem, hypothyroidism,     Psychiatric/Substance Use:     (+) psychiatric history depression alcohol abuse (5-10 drinks per week) Recreational drug usage: Cannabis (saturday last time; cutting back).    Infectious Disease:  - neg infectious disease ROS     Malignancy:  - neg malignancy  ROS     Other:            Physical Exam    Airway        Mallampati: I   TM distance: > 3 FB   Neck ROM: full   Mouth opening: > 3 cm    Respiratory Devices and Support         Dental  no notable dental history         Cardiovascular          Rhythm and rate: regular and normal     Pulmonary           breath sounds clear to auscultation           OUTSIDE LABS:  CBC:   Lab Results   Component Value Date    WBC 8.1 01/18/2022    WBC 8.1 01/27/2021    HGB 13.4 01/18/2022    HGB 13.3 01/27/2021    HCT 40.3 01/18/2022    HCT 39.4 01/27/2021     01/18/2022     01/27/2021     BMP:   Lab Results   Component Value Date     01/18/2022     01/27/2021    POTASSIUM 3.9 01/18/2022    POTASSIUM 3.7 01/27/2021    CHLORIDE 109 01/18/2022    CHLORIDE 108 01/27/2021    CO2 25 01/18/2022    CO2 25 01/27/2021    BUN 17 01/18/2022    BUN 20 01/27/2021    CR 0.82 01/18/2022    CR 0.88 01/27/2021    GLC 99 01/18/2022     (H) 01/27/2021     COAGS: No results found for: PTT, INR, FIBR  POC: No results found for: BGM, HCG, HCGS  HEPATIC:   Lab Results   Component Value Date    ALBUMIN 3.9 01/18/2022    PROTTOTAL 7.0 01/18/2022    ALT 23 01/18/2022    AST 17 01/18/2022    ALKPHOS 63 01/18/2022    BILITOTAL 0.3 01/18/2022     OTHER:   Lab Results   Component Value Date    JAYA 9.0 01/18/2022    TSH 2.45 01/18/2022    T4 1.41 03/25/2015    SED 6 07/01/2015       Anesthesia Plan    ASA Status:  2   NPO Status:  NPO Appropriate (sip water with meds)    Anesthesia Type: MAC.     - Reason for MAC: straight local not clinically adequate              Consents    Anesthesia Plan(s) and associated risks, benefits, and realistic alternatives discussed. Questions answered and patient/representative(s) expressed understanding.     - Discussed: Risks, Benefits and Alternatives for BOTH SEDATION and the PROCEDURE were discussed     - Discussed with:  Patient      - Extended Intubation/Ventilatory Support Discussed: No.      -  Patient is DNR/DNI Status: No    Use of blood products discussed: No .     Postoperative Care            Comments:                STACEY Colvin CRNA

## 2022-04-08 ENCOUNTER — OFFICE VISIT (OUTPATIENT)
Dept: FAMILY MEDICINE | Facility: OTHER | Age: 67
End: 2022-04-08
Attending: PHYSICIAN ASSISTANT
Payer: COMMERCIAL

## 2022-04-08 DIAGNOSIS — Z01.818 PRE-OP TESTING: Primary | ICD-10-CM

## 2022-04-08 PROCEDURE — U0005 INFEC AGEN DETEC AMPLI PROBE: HCPCS | Mod: ZL

## 2022-04-10 LAB — SARS-COV-2 RNA RESP QL NAA+PROBE: NEGATIVE

## 2022-04-12 ENCOUNTER — HOSPITAL ENCOUNTER (OUTPATIENT)
Facility: HOSPITAL | Age: 67
Discharge: HOME | End: 2022-04-12
Attending: OPHTHALMOLOGY | Admitting: OPHTHALMOLOGY
Payer: COMMERCIAL

## 2022-04-12 ENCOUNTER — ANESTHESIA (OUTPATIENT)
Dept: SURGERY | Facility: HOSPITAL | Age: 67
End: 2022-04-12
Payer: COMMERCIAL

## 2022-04-12 VITALS
BODY MASS INDEX: 25.52 KG/M2 | OXYGEN SATURATION: 96 % | HEART RATE: 79 BPM | SYSTOLIC BLOOD PRESSURE: 123 MMHG | WEIGHT: 144 LBS | RESPIRATION RATE: 16 BRPM | HEIGHT: 63 IN | TEMPERATURE: 98 F | DIASTOLIC BLOOD PRESSURE: 80 MMHG

## 2022-04-12 PROCEDURE — 370N000017 HC ANESTHESIA TECHNICAL FEE, PER MIN: Performed by: OPHTHALMOLOGY

## 2022-04-12 PROCEDURE — 250N000013 HC RX MED GY IP 250 OP 250 PS 637: Performed by: OPHTHALMOLOGY

## 2022-04-12 PROCEDURE — 250N000009 HC RX 250: Mod: ZNDC | Performed by: OPHTHALMOLOGY

## 2022-04-12 PROCEDURE — 66984 XCAPSL CTRC RMVL W/O ECP: CPT | Performed by: NURSE ANESTHETIST, CERTIFIED REGISTERED

## 2022-04-12 PROCEDURE — 710N000012 HC RECOVERY PHASE 2, PER MINUTE: Performed by: OPHTHALMOLOGY

## 2022-04-12 PROCEDURE — V2632 POST CHMBR INTRAOCULAR LENS: HCPCS | Performed by: OPHTHALMOLOGY

## 2022-04-12 PROCEDURE — 999N000141 HC STATISTIC PRE-PROCEDURE NURSING ASSESSMENT: Performed by: OPHTHALMOLOGY

## 2022-04-12 PROCEDURE — 250N000011 HC RX IP 250 OP 636: Performed by: OPHTHALMOLOGY

## 2022-04-12 PROCEDURE — 250N000011 HC RX IP 250 OP 636: Performed by: NURSE ANESTHETIST, CERTIFIED REGISTERED

## 2022-04-12 PROCEDURE — 272N000001 HC OR GENERAL SUPPLY STERILE: Performed by: OPHTHALMOLOGY

## 2022-04-12 PROCEDURE — 360N000076 HC SURGERY LEVEL 3, PER MIN: Performed by: OPHTHALMOLOGY

## 2022-04-12 DEVICE — IMPLANTABLE DEVICE: Type: IMPLANTABLE DEVICE | Site: EYE | Status: FUNCTIONAL

## 2022-04-12 RX ORDER — MOXIFLOXACIN 5 MG/ML
SOLUTION/ DROPS OPHTHALMIC PRN
Status: DISCONTINUED | OUTPATIENT
Start: 2022-04-12 | End: 2022-04-12 | Stop reason: HOSPADM

## 2022-04-12 RX ORDER — CYCLOPENTOLATE HYDROCHLORIDE 20 MG/ML
1 SOLUTION/ DROPS OPHTHALMIC
Status: COMPLETED | OUTPATIENT
Start: 2022-04-12 | End: 2022-04-12

## 2022-04-12 RX ORDER — NALOXONE HYDROCHLORIDE 0.4 MG/ML
0.4 INJECTION, SOLUTION INTRAMUSCULAR; INTRAVENOUS; SUBCUTANEOUS
Status: DISCONTINUED | OUTPATIENT
Start: 2022-04-12 | End: 2022-04-12 | Stop reason: HOSPADM

## 2022-04-12 RX ORDER — PHENYLEPHRINE HYDROCHLORIDE 100 MG/ML
1 SOLUTION/ DROPS OPHTHALMIC
Status: DISCONTINUED | OUTPATIENT
Start: 2022-04-12 | End: 2022-04-12 | Stop reason: HOSPADM

## 2022-04-12 RX ORDER — SODIUM CHLORIDE, SODIUM LACTATE, POTASSIUM CHLORIDE, CALCIUM CHLORIDE 600; 310; 30; 20 MG/100ML; MG/100ML; MG/100ML; MG/100ML
INJECTION, SOLUTION INTRAVENOUS CONTINUOUS
Status: DISCONTINUED | OUTPATIENT
Start: 2022-04-12 | End: 2022-04-12 | Stop reason: HOSPADM

## 2022-04-12 RX ORDER — ONDANSETRON 4 MG/1
4 TABLET, ORALLY DISINTEGRATING ORAL EVERY 30 MIN PRN
Status: DISCONTINUED | OUTPATIENT
Start: 2022-04-12 | End: 2022-04-12 | Stop reason: HOSPADM

## 2022-04-12 RX ORDER — TETRACAINE HYDROCHLORIDE 5 MG/ML
SOLUTION OPHTHALMIC PRN
Status: DISCONTINUED | OUTPATIENT
Start: 2022-04-12 | End: 2022-04-12 | Stop reason: HOSPADM

## 2022-04-12 RX ORDER — PILOCARPINE HYDROCHLORIDE 10 MG/ML
SOLUTION/ DROPS OPHTHALMIC PRN
Status: DISCONTINUED | OUTPATIENT
Start: 2022-04-12 | End: 2022-04-12 | Stop reason: HOSPADM

## 2022-04-12 RX ORDER — LEVOBUNOLOL HYDROCHLORIDE 5 MG/ML
SOLUTION/ DROPS OPHTHALMIC PRN
Status: DISCONTINUED | OUTPATIENT
Start: 2022-04-12 | End: 2022-04-12 | Stop reason: HOSPADM

## 2022-04-12 RX ORDER — NALOXONE HYDROCHLORIDE 0.4 MG/ML
0.2 INJECTION, SOLUTION INTRAMUSCULAR; INTRAVENOUS; SUBCUTANEOUS
Status: DISCONTINUED | OUTPATIENT
Start: 2022-04-12 | End: 2022-04-12 | Stop reason: HOSPADM

## 2022-04-12 RX ORDER — ACETAMINOPHEN 325 MG/1
650 TABLET ORAL ONCE
Status: COMPLETED | OUTPATIENT
Start: 2022-04-12 | End: 2022-04-12

## 2022-04-12 RX ORDER — LIDOCAINE HYDROCHLORIDE 10 MG/ML
INJECTION, SOLUTION EPIDURAL; INFILTRATION; INTRACAUDAL; PERINEURAL PRN
Status: DISCONTINUED | OUTPATIENT
Start: 2022-04-12 | End: 2022-04-12 | Stop reason: HOSPADM

## 2022-04-12 RX ORDER — PROPARACAINE HYDROCHLORIDE 5 MG/ML
1 SOLUTION/ DROPS OPHTHALMIC ONCE
Status: COMPLETED | OUTPATIENT
Start: 2022-04-12 | End: 2022-04-12

## 2022-04-12 RX ORDER — LIDOCAINE 40 MG/G
CREAM TOPICAL
Status: DISCONTINUED | OUTPATIENT
Start: 2022-04-12 | End: 2022-04-12 | Stop reason: HOSPADM

## 2022-04-12 RX ORDER — MEPERIDINE HYDROCHLORIDE 25 MG/ML
12.5 INJECTION INTRAMUSCULAR; INTRAVENOUS; SUBCUTANEOUS
Status: DISCONTINUED | OUTPATIENT
Start: 2022-04-12 | End: 2022-04-12 | Stop reason: HOSPADM

## 2022-04-12 RX ORDER — PHENYLEPHRINE HYDROCHLORIDE 25 MG/ML
1 SOLUTION/ DROPS OPHTHALMIC
Status: COMPLETED | OUTPATIENT
Start: 2022-04-12 | End: 2022-04-12

## 2022-04-12 RX ORDER — ONDANSETRON 2 MG/ML
4 INJECTION INTRAMUSCULAR; INTRAVENOUS EVERY 30 MIN PRN
Status: DISCONTINUED | OUTPATIENT
Start: 2022-04-12 | End: 2022-04-12 | Stop reason: HOSPADM

## 2022-04-12 RX ADMIN — MIDAZOLAM 2 MG: 1 INJECTION INTRAMUSCULAR; INTRAVENOUS at 09:04

## 2022-04-12 RX ADMIN — CYCLOPENTOLATE HYDROCHLORIDE 1 DROP: 20 SOLUTION/ DROPS OPHTHALMIC at 07:40

## 2022-04-12 RX ADMIN — PHENYLEPHRINE HYDROCHLORIDE 1 DROP: 25 SOLUTION/ DROPS OPHTHALMIC at 07:40

## 2022-04-12 RX ADMIN — FLURBIPROFEN SODIUM 0.5 ML: 0.3 SOLUTION/ DROPS OPHTHALMIC at 07:41

## 2022-04-12 RX ADMIN — CYCLOPENTOLATE HYDROCHLORIDE 1 DROP: 20 SOLUTION/ DROPS OPHTHALMIC at 07:32

## 2022-04-12 RX ADMIN — PHENYLEPHRINE HYDROCHLORIDE 1 DROP: 25 SOLUTION/ DROPS OPHTHALMIC at 07:32

## 2022-04-12 RX ADMIN — PROPARACAINE HYDROCHLORIDE 1 DROP: 5 SOLUTION/ DROPS OPHTHALMIC at 07:32

## 2022-04-12 RX ADMIN — ACETAMINOPHEN 650 MG: 325 TABLET, FILM COATED ORAL at 07:28

## 2022-04-12 ASSESSMENT — LIFESTYLE VARIABLES: TOBACCO_USE: 1

## 2022-04-12 NOTE — ANESTHESIA POSTPROCEDURE EVALUATION
Patient: Idalia Hoang    Procedure: Procedure(s):  PHACOEMULSIFICATION CATARACT EXTRACTION POSTERIOR CHAMBER LENS LEFT EYE       Anesthesia Type:  MAC    Note:  Disposition: Outpatient   Postop Pain Control: Uneventful            Sign Out: Well controlled pain   PONV: No   Neuro/Psych: Uneventful            Sign Out: Acceptable/Baseline neuro status   Airway/Respiratory: Uneventful            Sign Out: Acceptable/Baseline resp. status   CV/Hemodynamics: Uneventful            Sign Out: Acceptable CV status; No obvious hypovolemia; No obvious fluid overload   Other NRE: NONE   DID A NON-ROUTINE EVENT OCCUR? No           Last vitals:  Vitals Value Taken Time   /80 04/12/22 0945   Temp 98  F (36.7  C) 04/12/22 0930   Pulse 79 04/12/22 0945   Resp 16 04/12/22 0945   SpO2 94 % 04/12/22 0947   Vitals shown include unvalidated device data.    Electronically Signed By: STACEY Colvin CRNA  April 12, 2022  10:49 AM

## 2022-04-12 NOTE — ANESTHESIA CARE TRANSFER NOTE
Patient: Idalia Hoang    Procedure: Procedure(s):  PHACOEMULSIFICATION CATARACT EXTRACTION POSTERIOR CHAMBER LENS LEFT EYE       Diagnosis: Nuclear sclerotic cataract of left eye [H25.12]  Diagnosis Additional Information: No value filed.    Anesthesia Type:   MAC     Note:    Oropharynx: oropharynx clear of all foreign objects  Level of Consciousness: awake  Oxygen Supplementation: room air    Independent Airway: airway patency satisfactory and stable  Dentition: dentition unchanged  Vital Signs Stable: post-procedure vital signs reviewed and stable  Report to RN Given: handoff report given  Patient transferred to: Phase II    Handoff Report: Identifed the Patient, Identified the Reponsible Provider, Reviewed the pertinent medical history, Discussed the surgical course, Reviewed Intra-OP anesthesia mangement and issues during anesthesia, Set expectations for post-procedure period and Allowed opportunity for questions and acknowledgement of understanding      Vitals:  Vitals Value Taken Time   BP     Temp     Pulse     Resp     SpO2         Electronically Signed By: STACEY Colvin CRNA  April 12, 2022  9:26 AM

## 2022-04-12 NOTE — LETTER
Idalia Hoang  [address] [facility] [address]      SURGERY PACKET            Your surgery is scheduled:    Date: 04/12/22  ________________________________    Time: We will call 04/11 afternoon with your admit time.  ________________________________    Please arrive at:  [facility] Admitting [facility] Entrance.  ______________________    Surgeon's Name: Dr. Crawford  _______________________        Pre-Op Physical Fax Numbers:          Pre-Admissions  [phone]        Your surgery is located at:  [address] [facility] [address]         Before Your Surgery  For Patients and Visitors at [facility]    Welcome  As you get ready for surgery, you may have a lot of questions.  This brochure will help you know what to expect before and after surgery.  You and your family are the most important members of your health care team.  You will need to take an active role in your care.    Be sure to ask questions and learn all that you can about your surgery.  If you have any safety concerns, we urge you to tell a nurse as soon as possible.   This brochure is for information only.  It does not replace the advice of your doctor.  Always follow your doctor's advice.    Please tell us if you need a .    GETTING READY FOR SURGERY  Always follow your surgeon's instructions.  If you don't, your surgery could be canceled.  Please use the following checklist.    Within 30 Days of Surgery:    Have a pre-surgery physical exam with your family doctor or partner.    If you use a [facility], all of your information from the pre-op physical will be in the [email or web address] computer system.    Ask the doctor to send all of your results to the hospital before the surgery.  The doctor also may ask you to bring the results with you on the day of surgery or you can fax them to [phone].  Tell the doctor if:    You are allergic to latex or rubber  (Latex and rubber gloves are  often used in medical care).    You are taking any medicines (including aspirin), vitamins (Vitamin E, Fish Oil, Omegas) or herbal products.  You will need to stop taking some medicines before surgery.    You have any medical problems (allergies, diabetes or heart disease, for example).    You have a pacemaker or an AICD (automatic implanted cardiac defibrillator).  If you do, please bring the ID card with you on the day of surgery.    You are a smoker.  People who smoke have a higher risk of infection after surgery.  Ask your doctor how you can quit smoking.  Within 7 days of Surgery:  Prior to your surgical procedure, a nurse will be contacting you to obtain a health history. A nurse will call you within a few days of surgery to go over these and other instructions.  If you do not hear from them, please call them at [phone].        Call your insurance company.  Ask if you need pre-approval for your surgery.  If you do not have insurance, please let us know.      Arrange for someone to drive you home after surgery.  If you will have same-day surgery, you may not drive or take public transportation home by yourself.    Arrange for someone to stay with you for 24 hours after you go home.  This person must be a responsible adult (ie- Family member or friend).  The Day Before Surgery:     Call your surgeon if there are any changes in your health.  This includes signs of a cold or flu (such as a sore throat, runny nose, cough, rash or fever).    Do not smoke, drink alcohol or take over-the-counter medicine (unless your surgeon tells you to) for 24 hours before and after surgery.    If you take prescribed drugs:  You may need to stop them until after the surgery.  Follow your doctor's orders.  You may resume Aspirin and/or blood thinners after your surgery as directed by your physician/surgeon.    NO FOOD OR DRINK AFTER MIDNIGHT.  Follow your surgeon's orders for eating and drinking.  You need to have an empty  stomach before surgery.  This will make the surgery as safe as possible.  If you don't follow your doctor's orders, your surgery could be changed to another date.    The Day of Surgery:    Take a shower or bath the night before and the morning of surgery.  Use regular soap.  Gently clean the skin.     Please remove deodorant, cologne, scented lotion, makeup, nail polish and jewelry (including rings and body piercings).  If you wear artificial nails, please remove at least one nail before coming to the hospital.    Wear clean, loose clothing to the hospital.    Bring these items to the hospital:  1. Your insurance card.  2. A list of all the medicines you take.  Include vitamins, minerals, herbs and over-the-counter drugs.  Note any drug allergies.  3. A copy of your advance health care directive, if you have one.  This tells us what treatment you would want -- and who would make health care decisions -- if you could no longer speak for yourself.  You may request this form in advance or download it from [email or web address].  4. A case for any glasses, contact lenses, hearing aids or dentures.  5. Your inhaler or CPAP machine, if you use these at home.  Leave extra cash, jewelry and other valuables at home.    When You Arrive:  When you get to the hospital, you will:    Check in.  If you are under age 18, you must be with a parent or legal guardian.    Electronically sign consent forms, if you haven't already.  These electronic forms state that you know the risks and benefits of surgery.  When you sign the forms, you give us permission to do the surgery.  Do not sign them unless you understand what will happen during and after your surgery.  If you have any questions about your surgery, ask to speak with your doctor before you sign the forms.  If you don't understand the answers, ask again.    Receive a copy of the Patients Bill of Rights.  If you do not receive a copy, please ask for one.    Change into hospital  "clothes.  Your belongings will be placed in a bag.  We will return them to you after surgery.    Meet with the anesthesia provider.  He or she will tell you what kind of anesthesia (medicine) will be used to keep you comfortable during surgery.  Remember: It's okay to remind doctors and nurses to wash their hands before touching you.   In most cases, your surgeon will use a marker to write his or her initials on the surgery site.  This ensures that the exact site is operated on.  For safety reasons, we will ask you the same questions many times.  For example, we may ask your name and birth date over and over again.  Friends and family can stay with you until it's time for surgery.  While you're in surgery, they will be in the waiting area.  Please note that cell phones are not allowed in some patient care areas.  If you have questions about what will happen in the operating room, talk to your care team.  After Surgery:    We will move you to a recovery room where we will watch you closely.  If you have any pain or discomfort, tell your nurse.  He or she will try to make you comfortable.      If you are staying overnight we will move you to your hospital room after you are awake.    If you are going home we will move you to another room.  Friends and family may be able to join you.  The length of time you spend in recovery depends on the type of medicine you received, your medical condition, and the type of surgery you had.  Dealing with pain:  A nurse will check your comfort level often during your stay.  He or she will work with you to manage your pain.  Remember:    All pain is real.  There are many ways to control pain.  We can help you decide what works best for you.    Ask for pain medicine when you need it.  Don't try to \"tough it out\" -- this can make you feel worse.  Always take your medicine as ordered.    Medicine doesn't work the same for everyone.  If your medicine isn't working tell your nurse.  There " may be other medicines or treatments we can try.  Going Home:  We will let you know when you're ready to leave the hospital.  Before you leave, we will tell you how to care for yourself at home and prevent infections.  If you do not understand something, please say so.  We will answer any questions you have.  We will then help you get ready to leave.  You must have an adult with you for the first 24 hours after you leave the hospital. Take it easy when you get home.  You will need some time to recover -- you may be more tired than you realize at first.  Rest and relax for at least the first 24 hours at home.  You'll feel better and heal faster if you take good care of yourself.                      Thank you for following these important instructions.      You have been scheduled for surgery and we would like to give you some information that will assist in helping get the best possible outcome.      Before Surgery:   If for any reason you decide not to have the surgery, please contact our office.  We can easily cancel or reschedule the procedure. Please call the  at [phone] or after hours [phone]      Any pain related to the surgery that occurs before the surgery needs to be reported and managed by your primary care or referring doctor.      Please keep in mind that the time of surgery is subject to change.  Make sure you have nothing to eat or drink after midnight.  If your surgery is later in the afternoon, this recommendation might change, but not until the day before surgery after the actual time of the surgery has been established.    After Surgery:  When you are discharged from the recovery room, the nurses will review instructions with you and your caregiver.      Please wash your hands every time you touch the wound or change bandages or dressings.      Do not submerge the wound in water.  You may not use a bathtub or hot tub until the wound is closed.  The wait time frame is generally  2-3 weeks but any open area can be a source of incoming bacteria, so it is better to be on the safe side and avoid the tub until your wound is fully healed.      You may take a shower 24 hours after surgery.  Double check with your surgeon if it is ok for water to run over a wound, whether it has been sutured, stapled, glued or is open.  You may gently wash the wound using the antiseptic soap provided for your pre-surgery showering (do not use a washcloth).  Any mild soap will work as well.      Many surgical wounds will have small white strips of tape on them called Steri Strips.  Do not remove these.  The edges will curl and fall off within 7-10 days with normal showering.      If you are going home with sutures (stitches) or staples, you must return to the clinic to have them taken out, usually within 1-2 weeks.      Signs and symptoms of infection include:  1. Fever, temperature over 101.5 ' F  2. Redness  3. Swelling  4. Increasing pain  5. Green or yellow drainage which may or may not have a foul odor.    Symptoms of infection need to be reported to your surgery office. Please call your Surgeon.      If you or your  are deaf or hard of hearing, or prefer a language other than English, please let us know.  We have many free services, including interpreters and other aids to help you communicate. You may ask for help  through any member of your care team or by calling Language Services at [phone], option 2.

## 2022-04-12 NOTE — OP NOTE
[facility]  Ophthalmology Full Operative Note    Pre-operative diagnosis: Nuclear sclerotic cataract of left eye [H25.12]   Post-operative diagnosis Same   Procedure: Procedure(s):  PHACOEMULSIFICATION CATARACT EXTRACTION POSTERIOR CHAMBER LENS LEFT EYE   Surgeon: Tremayne Crawford MD   Assistants(s):    Anesthesia: Combined MAC with Topical    Estimated blood loss: None    Total IV fluids: (See anesthesia record)   Specimens: None   Implants: 13 LI61AO   Findings:    Complications: None   Condition: Stable   Comments:       PROCEDURAL ANESTHESIA:     Topical/MAC.  Lidocaine 2% jelly topically and Lidocaine 1% preservative-free intracamerally.     PROCEDURE:  The patient was brought to the Operating Room and prepped and draped in a sterile manner.  A wire lid speculum was placed.  A paracentesis was created and 1% Lidocaine was instilled in the anterior chamber.  The anterior chamber was then filled with Amvisc viscoelastic.  A clear cornea temporal wound was created using a 2.8 mm keratome.  A cystotome was used to initiate a flap in the anterior capsule and a Utrata forceps was used to create a continuous tear capsulorhexis.  Hydrodissection was performed.  The phacoemulsification tip was inserted into the eye and the nucleus and epinucleus were removed using a divide and conquer technique.  The residual cortex was removed using the I/A handpiece.  The anterior chamber was then refilled with viscoelastic and the wound was enlarged with the keratome.  The intraocular lens, 13 diopter, Model LI61AO, was placed into the injector and injected into the capsular bag. It was checked to make sure that it was central and stable.  Residual viscoelastic was removed using the I/A.  The anterior chamber was refilled with BSS.  The wounds were hydrated with BSS and were noted to be watertight with no suture necessary.  Topical pilocarpine 1%, Betagan, and Vigamox was applied.  A hard shield was placed.     The  patient tolerated the procedure well and was sent to the Recovery Room in satisfactory condition.

## 2022-04-12 NOTE — OR NURSING
Patient and responsible adult given discharge instructions with no questions regarding instructions. Daniel score 20/20  . Pain level 0/10.  Discharged from unit via ambulation. Patient discharged to home with sister.

## 2022-04-15 ENCOUNTER — ALLIED HEALTH/NURSE VISIT (OUTPATIENT)
Dept: FAMILY MEDICINE | Facility: OTHER | Age: 67
End: 2022-04-15
Attending: PHYSICIAN ASSISTANT
Payer: COMMERCIAL

## 2022-04-15 DIAGNOSIS — E53.8 VITAMIN B12 DEFICIENCY (NON ANEMIC): Primary | ICD-10-CM

## 2022-04-15 PROCEDURE — 96372 THER/PROPH/DIAG INJ SC/IM: CPT

## 2022-04-15 RX ORDER — CYANOCOBALAMIN 1000 UG/ML
1000 INJECTION, SOLUTION INTRAMUSCULAR; SUBCUTANEOUS
Status: ACTIVE | OUTPATIENT
Start: 2022-04-15 | End: 2023-04-10

## 2022-04-15 RX ADMIN — CYANOCOBALAMIN 1000 MCG: 1000 INJECTION INTRAMUSCULAR; SUBCUTANEOUS at 13:05

## 2022-04-15 NOTE — NURSING NOTE
Clinic Administered Medication Documentation          Injectable Medication Documentation    Patient was given Cyanocobalamin (B-12). Prior to medication administration, verified patients identity using patient s name and date of birth. Please see MAR and medication order for additional information. Patient instructed to remain in clinic for 15 minutes, report any adverse reaction to staff immediately  and stay in clinic after the injection but patient declined.      Was entire vial of medication used? Yes  Vial/Syringe: Single dose vial  Expiration Date:  07/15/2023  Was this medication supplied by the patient? No   Soledad Orosco LPN

## 2022-04-22 PROCEDURE — 96372 THER/PROPH/DIAG INJ SC/IM: CPT

## 2022-05-16 ENCOUNTER — ALLIED HEALTH/NURSE VISIT (OUTPATIENT)
Dept: FAMILY MEDICINE | Facility: OTHER | Age: 67
End: 2022-05-16
Payer: COMMERCIAL

## 2022-05-16 DIAGNOSIS — D51.9 B12 DEFICIENCY ANEMIA: Primary | ICD-10-CM

## 2022-05-16 PROCEDURE — 96372 THER/PROPH/DIAG INJ SC/IM: CPT

## 2022-05-16 RX ORDER — CYANOCOBALAMIN 1000 UG/ML
1000 INJECTION, SOLUTION INTRAMUSCULAR; SUBCUTANEOUS
Status: ACTIVE | OUTPATIENT
Start: 2022-05-16 | End: 2023-05-11

## 2022-05-16 RX ADMIN — CYANOCOBALAMIN 1000 MCG: 1000 INJECTION, SOLUTION INTRAMUSCULAR; SUBCUTANEOUS at 15:27

## 2022-05-16 NOTE — NURSING NOTE
Clinic Administered Medication Documentation          Injectable Medication Documentation    Patient was given Cyanocobalamin (B-12). Prior to medication administration, verified patients identity using patient s name and date of birth. Please see MAR and medication order for additional information. Patient instructed to remain in clinic for 15 minutes, report any adverse reaction to staff immediately  and stay in clinic after the injection but patient declined.      Was entire vial of medication used? Yes  Vial/Syringe: Single dose vial  Expiration Date:  12/22  Was this medication supplied by the patient? No   Soledad Orosco LPN

## 2022-06-16 ENCOUNTER — ALLIED HEALTH/NURSE VISIT (OUTPATIENT)
Dept: FAMILY MEDICINE | Facility: OTHER | Age: 67
End: 2022-06-16
Attending: PHYSICIAN ASSISTANT
Payer: COMMERCIAL

## 2022-06-16 DIAGNOSIS — D64.9 ANEMIA, UNSPECIFIED TYPE: Primary | ICD-10-CM

## 2022-06-16 PROCEDURE — 96372 THER/PROPH/DIAG INJ SC/IM: CPT

## 2022-06-16 RX ADMIN — CYANOCOBALAMIN 1000 MCG: 1000 INJECTION, SOLUTION INTRAMUSCULAR; SUBCUTANEOUS at 11:15

## 2022-07-18 ENCOUNTER — ALLIED HEALTH/NURSE VISIT (OUTPATIENT)
Dept: FAMILY MEDICINE | Facility: OTHER | Age: 67
End: 2022-07-18
Attending: PHYSICIAN ASSISTANT
Payer: COMMERCIAL

## 2022-07-18 DIAGNOSIS — Z71.89 INJECTION EDUCATION, ENCOUNTER FOR: Primary | ICD-10-CM

## 2022-07-18 PROCEDURE — 96372 THER/PROPH/DIAG INJ SC/IM: CPT

## 2022-07-18 RX ADMIN — CYANOCOBALAMIN 1000 MCG: 1000 INJECTION, SOLUTION INTRAMUSCULAR; SUBCUTANEOUS at 11:38

## 2022-07-18 NOTE — PROGRESS NOTES
Clinic Administered Medication Documentation          Injectable Medication Documentation    Patient was given Cyanocobalamin (B-12). Prior to medication administration, verified patients identity using patient s name and date of birth. Please see MAR and medication order for additional information. Patient instructed to remain in clinic for 15 minutes and report any adverse reaction to staff immediately .      Was entire vial of medication used? Yes  Vial/Syringe: Single dose vial  Expiration Date:  01/24  Was this medication supplied by the patient? No

## 2022-08-16 ENCOUNTER — ALLIED HEALTH/NURSE VISIT (OUTPATIENT)
Dept: FAMILY MEDICINE | Facility: OTHER | Age: 67
End: 2022-08-16
Attending: PHYSICIAN ASSISTANT
Payer: COMMERCIAL

## 2022-08-16 DIAGNOSIS — E53.8 VITAMIN B12 DEFICIENCY (NON ANEMIC): Primary | ICD-10-CM

## 2022-08-16 PROCEDURE — 96372 THER/PROPH/DIAG INJ SC/IM: CPT

## 2022-08-16 RX ORDER — CYANOCOBALAMIN 1000 UG/ML
1000 INJECTION, SOLUTION INTRAMUSCULAR; SUBCUTANEOUS
Status: DISCONTINUED | OUTPATIENT
Start: 2022-08-16 | End: 2023-07-20

## 2022-08-16 RX ADMIN — CYANOCOBALAMIN 1000 MCG: 1000 INJECTION, SOLUTION INTRAMUSCULAR; SUBCUTANEOUS at 10:58

## 2022-08-16 NOTE — NURSING NOTE
Clinic Administered Medication Documentation          Injectable Medication Documentation    Patient was given Cyanocobalamin (B-12). Prior to medication administration, verified patients identity using patient s name and date of birth. Please see MAR and medication order for additional information. Patient instructed to remain in clinic for 15 minutes, report any adverse reaction to staff immediately  and stay in clinic after the injection but patient declined.      Was entire vial of medication used? Yes  Vial/Syringe: Single dose vial  Expiration Date:  01/24  Was this medication supplied by the patient? No   Soledad Orosco LPN

## 2022-10-14 ENCOUNTER — ALLIED HEALTH/NURSE VISIT (OUTPATIENT)
Dept: FAMILY MEDICINE | Facility: OTHER | Age: 67
End: 2022-10-14
Attending: PHYSICIAN ASSISTANT
Payer: COMMERCIAL

## 2022-10-14 DIAGNOSIS — D51.9 B12 DEFICIENCY ANEMIA: Primary | ICD-10-CM

## 2022-10-14 PROCEDURE — 96372 THER/PROPH/DIAG INJ SC/IM: CPT

## 2022-10-14 RX ADMIN — CYANOCOBALAMIN 1000 MCG: 1000 INJECTION, SOLUTION INTRAMUSCULAR; SUBCUTANEOUS at 10:57

## 2022-10-18 ENCOUNTER — PREP FOR PROCEDURE (OUTPATIENT)
Dept: SURGERY | Facility: OTHER | Age: 67
End: 2022-10-18

## 2022-10-18 ENCOUNTER — OFFICE VISIT (OUTPATIENT)
Dept: SURGERY | Facility: OTHER | Age: 67
End: 2022-10-18
Attending: NURSE PRACTITIONER
Payer: COMMERCIAL

## 2022-10-18 VITALS
HEIGHT: 63 IN | WEIGHT: 145 LBS | HEART RATE: 73 BPM | DIASTOLIC BLOOD PRESSURE: 64 MMHG | TEMPERATURE: 98.2 F | SYSTOLIC BLOOD PRESSURE: 116 MMHG | OXYGEN SATURATION: 99 % | BODY MASS INDEX: 25.69 KG/M2

## 2022-10-18 DIAGNOSIS — Z86.0100 HISTORY OF COLONIC POLYPS: Primary | ICD-10-CM

## 2022-10-18 PROCEDURE — 99213 OFFICE O/P EST LOW 20 MIN: CPT | Performed by: NURSE PRACTITIONER

## 2022-10-18 RX ORDER — BISACODYL 5 MG/1
5 TABLET, DELAYED RELEASE ORAL DAILY PRN
Qty: 4 TABLET | Refills: 0 | Status: SHIPPED | OUTPATIENT
Start: 2022-10-18 | End: 2023-03-22 | Stop reason: ALTCHOICE

## 2022-10-18 RX ORDER — POLYETHYLENE GLYCOL 3350, SODIUM SULFATE ANHYDROUS, SODIUM BICARBONATE, SODIUM CHLORIDE, POTASSIUM CHLORIDE 236; 22.74; 6.74; 5.86; 2.97 G/4L; G/4L; G/4L; G/4L; G/4L
4000 POWDER, FOR SOLUTION ORAL ONCE
Qty: 4000 ML | Refills: 0 | Status: SHIPPED | OUTPATIENT
Start: 2022-10-18 | End: 2022-10-18

## 2022-10-18 ASSESSMENT — PAIN SCALES - GENERAL: PAINLEVEL: NO PAIN (0)

## 2022-10-18 NOTE — H&P (VIEW-ONLY)
CLINIC NOTE - CONSULT  10/18/2022    Patient:Idalia Hoang    Referring Physician: Love Noriega PA-C    Reason for Referral: History of Colon Polyps    This is a 66 year old female with a need of a colonoscopy for History of Colon Polyps.     Personal history of colon cancer : NO   Family history of colon cancer : NO   Personal history of polyps : YES   Family history of polyps : NO   History of Inflammatory Bowel Disease : NO   History of rectal bleeding : NO   Changes in bowel habits : NO   Last colonoscopy : over 15 years ago    Past Medical History:  Past Medical History:   Diagnosis Date     Acquired hypothyroidism 4/25/2016     Chronic serous otitis media, simple or unspecified 06/18/2012     Depression, recurrent 01/01/2011     Dysfunction of eustachian tube 06/18/2012     Estradiol deficiency 4/25/2016     Hypothyroidism 01/01/2011     Mixed hyperlipidemia 4/25/2016     Osteopenia        Past Surgical History:  Past Surgical History:   Procedure Laterality Date     COLONOSCOPY  04/20/2012     PHACOEMULSIFICATION WITH STANDARD INTRAOCULAR LENS IMPLANT Left 4/12/2022    Procedure: PHACOEMULSIFICATION CATARACT EXTRACTION POSTERIOR CHAMBER LENS LEFT EYE;  Surgeon: Tremayne Crawford MD;  Location: [facility]     surgical removal  2010    tendon nodule; free of disease       Family History History:  Family History   Problem Relation Age of Onset     Hypertension Sister      Melanoma Sister      C.A.D. Other         grandmother     Cancer Other         lung; grandmother/leukemia; grandfather     Depression Other         family h/o     Hypertension Other         grandmother     Breast Cancer Sister 62     Other - See Comments Mother         memory loss/sjogrensyndrome/stomach     Osteoporosis Mother      Alzheimer Disease Mother      Hyperlipidemia Sister      Diabetes Father      Coronary Artery Disease Father         a fib     Thyroid Disease Sister      Asthma Brother        History of Tobacco Use:  History  "  Smoking Status     Former     Packs/day: 0.30     Years: 1.00     Types: Cigarettes   Smokeless Tobacco     Never       Current Medications:  Current Outpatient Medications   Medication Sig Dispense Refill     coenzyme Q10 (CO-Q10) 30 MG capsule Take 1 capsule (30 mg) by mouth daily 90 capsule 1     EUTHYROX 88 MCG tablet Take 1 tablet by mouth once daily 90 tablet 2     venlafaxine (EFFEXOR-XR) 75 MG 24 hr capsule Take one by mouth per day. 90 capsule 3       Allergies:  No Known Allergies    ROS:  Constitutional: negative  Eyes: negative  Ears, nose, mouth, throat, and face: negative  Respiratory: negative  Cardiovascular: negative  Gastrointestinal: positive for history of colon polyps  Genitourinary:negative  Integument/breast: negative  Hematologic/lymphatic: negative  Musculoskeletal: negative  Neurological: positive for memory problems  Behavioral/Psych: positive for alcohol use, anxiety  Endocrine: negative  Allergic/Immunologic: negative    PHYSICAL EXAM:     Vital signs: /64 (BP Location: Right arm, Patient Position: Chair, Cuff Size: Adult Large)   Pulse 73   Temp 98.2  F (36.8  C) (Tympanic)   Ht 1.6 m (5' 3\")   Wt 65.8 kg (145 lb)   SpO2 99%   BMI 25.69 kg/m     BMI: Body mass index is 25.69 kg/m .   General: Normal, healthy, cooperative, in no acute distress, alert   Skin: no rashes   Lungs: clear to auscultation   CV: Regular rate and rhythm   Abdominal: abdomen is soft without significant tenderness, masses, organomegaly or guarding   Extremities: No cyanosis, clubbing or edema noted bilaterally in Upper and Lower Extremities   Neurological: without deficit    ASSESSMENT:      66 year old female with a need of a colonoscopy for History of Colon Polyps.    PLAN:   A colonoscopy will be scheduled.  The procedure with their risks, benefits and alternatives were explained.  Risks include but are not limited to bleeding, perforation, missing lesions, need for additional procedures, reaction " to anesthesia.  All the patients questions were answered.  The patient consents to proceed as planned.

## 2022-10-18 NOTE — NURSING NOTE
"Chief Complaint   Patient presents with     Consult     Colonoscopy consult-KATELYN Noriega- last colonoscopy 15 years ago, cologuard done a few years ago.  History of polyps       Initial /64 (BP Location: Right arm, Patient Position: Chair, Cuff Size: Adult Large)   Pulse 73   Temp 98.2  F (36.8  C) (Tympanic)   Ht 1.6 m (5' 3\")   Wt 65.8 kg (145 lb)   SpO2 99%   BMI 25.69 kg/m   Estimated body mass index is 25.69 kg/m  as calculated from the following:    Height as of this encounter: 1.6 m (5' 3\").    Weight as of this encounter: 65.8 kg (145 lb).  Medication Reconciliation: complete  PAOLA OLIVIA LPN    "

## 2022-10-18 NOTE — PATIENT INSTRUCTIONS
Thank you for allowing Jaye Ferreira CNP and our surgical team to participate in your care. Please call our health unit coordinator at [phone] with scheduling questions or the nurse at [phone] with any other questions or concerns.      You have been scheduled for colonoscopy with  on November 17, 2022.   You will use [employer] bowel prep.  Please see handout for additional instruction.  You do not need a pre-operative appointment with your primary care provider.  You may call [phone] or [phone] with any questions.     COVID-19 test is needed prior to procedure, even if you've been vaccinated.   If you are going home on the same day as your procedure (surgery):    1-2 days before your procedure, take an at-home, rapid antigen test. You can buy these at many stores. If you can't find an at-home antigen test, please schedule a PCR test with [facility] by calling [phone], or visiting "[email or web address]. We can't accept tests that are more than 4 days old.    If your test is NEGATIVE, please take a photo of the test. Show the photo to the nurse when you come in for your procedure (surgery)    If your test is POSITIVE, please contact the pre-Admission office at [phone]. If you are calling after 5 PM on weekdays, and on the weekend, please call [phone] and ask to speak with the House Supervisor.   If you are staying overnight in the hospital you will need to get a PCR covid test 2-4 days prior to procedure (surgery).      Patient has chosen to test at home

## 2022-10-29 ENCOUNTER — HEALTH MAINTENANCE LETTER (OUTPATIENT)
Age: 67
End: 2022-10-29

## 2022-11-09 ENCOUNTER — ANESTHESIA EVENT (OUTPATIENT)
Dept: SURGERY | Facility: HOSPITAL | Age: 67
End: 2022-11-09
Payer: COMMERCIAL

## 2022-11-09 ASSESSMENT — LIFESTYLE VARIABLES: TOBACCO_USE: 1

## 2022-11-09 NOTE — ANESTHESIA PREPROCEDURE EVALUATION
Anesthesia Pre-Procedure Evaluation    Patient: Idalia Hoang   MRN: [id number] : 1955        Procedure : Procedure(s):  COLONOSCOPY          Past Medical History:   Diagnosis Date     Acquired hypothyroidism 2016     Chronic serous otitis media, simple or unspecified 2012     Depression, recurrent 2011     Dysfunction of eustachian tube 2012     Estradiol deficiency 2016     Hypothyroidism 2011     Mixed hyperlipidemia 2016     Osteopenia       Past Surgical History:   Procedure Laterality Date     COLONOSCOPY  2012     PHACOEMULSIFICATION WITH STANDARD INTRAOCULAR LENS IMPLANT Left 2022    Procedure: PHACOEMULSIFICATION CATARACT EXTRACTION POSTERIOR CHAMBER LENS LEFT EYE;  Surgeon: Tremayne Crawford MD;  Location: [facility]     surgical removal      tendon nodule; free of disease      No Known Allergies   Social History     Tobacco Use     Smoking status: Former     Packs/day: 0.30     Years: 1.00     Pack years: 0.30     Types: Cigarettes     Smokeless tobacco: Never     Tobacco comments:     quit ; no passive exposure   Substance Use Topics     Alcohol use: Yes     Comment: 1 drink beer and wine daily      Wt Readings from Last 1 Encounters:   10/18/22 65.8 kg (145 lb)        Anesthesia Evaluation   Pt has had prior anesthetic. Type: General and MAC.    No history of anesthetic complications       ROS/MED HX  ENT/Pulmonary:     (+) tobacco use (THC occasional), Past use,     Neurologic:       Cardiovascular:     (+) Dyslipidemia -----    METS/Exercise Tolerance: >4 METS    Hematologic:  - neg hematologic  ROS     Musculoskeletal:  - neg musculoskeletal ROS     GI/Hepatic:  - neg GI/hepatic ROS   (+) bowel prep, hepatitis (has been treated and in remission) type C,     Renal/Genitourinary:  - neg Renal ROS     Endo:     (+) thyroid problem, hypothyroidism,     Psychiatric/Substance Use:     (+) psychiatric history depression alcohol abuse (5-10 drinks  per week) Recreational drug usage: Cannabis (saturday last time; cutting back).    Infectious Disease:  - neg infectious disease ROS     Malignancy:  - neg malignancy ROS     Other:     (-) Any chance pregnant       Physical Exam    Airway             Respiratory Devices and Support         Dental  no notable dental history   Comment: Left back implanted tooth, nonrenewable.        Cardiovascular   cardiovascular exam normal       Rhythm and rate: regular and normal     Pulmonary   pulmonary exam normal        breath sounds clear to auscultation           OUTSIDE LABS:  CBC:   Lab Results   Component Value Date    WBC 8.1 01/18/2022    WBC 8.1 01/27/2021    HGB 13.4 01/18/2022    HGB 13.3 01/27/2021    HCT 40.3 01/18/2022    HCT 39.4 01/27/2021     01/18/2022     01/27/2021     BMP:   Lab Results   Component Value Date     01/18/2022     01/27/2021    POTASSIUM 3.9 01/18/2022    POTASSIUM 3.7 01/27/2021    CHLORIDE 109 01/18/2022    CHLORIDE 108 01/27/2021    CO2 25 01/18/2022    CO2 25 01/27/2021    BUN 17 01/18/2022    BUN 20 01/27/2021    CR 0.82 01/18/2022    CR 0.88 01/27/2021    GLC 99 01/18/2022     (H) 01/27/2021     COAGS: No results found for: PTT, INR, FIBR  POC: No results found for: BGM, HCG, HCGS  HEPATIC:   Lab Results   Component Value Date    ALBUMIN 3.9 01/18/2022    PROTTOTAL 7.0 01/18/2022    ALT 23 01/18/2022    AST 17 01/18/2022    ALKPHOS 63 01/18/2022    BILITOTAL 0.3 01/18/2022     OTHER:   Lab Results   Component Value Date    JAYA 9.0 01/18/2022    TSH 2.45 01/18/2022    T4 1.41 03/25/2015    SED 6 07/01/2015       Anesthesia Plan    ASA Status:  2   NPO Status:  NPO Appropriate (one beer last night)    Anesthesia Type: MAC.     - Reason for MAC: chronic cardiopulmonary disease, straight local not clinically adequate   Induction: Intravenous, Propofol.   Maintenance: TIVA.        Consents    Anesthesia Plan(s) and associated risks, benefits, and realistic  alternatives discussed. Questions answered and patient/representative(s) expressed understanding.    - Discussed:     - Discussed with:  Patient         Postoperative Care            Comments:    Other Comments:  10-18-22            STACEY Rodríguez CRNA

## 2022-11-17 ENCOUNTER — HOSPITAL ENCOUNTER (OUTPATIENT)
Facility: HOSPITAL | Age: 67
Discharge: HOME | End: 2022-11-17
Attending: SURGERY | Admitting: SURGERY
Payer: COMMERCIAL

## 2022-11-17 ENCOUNTER — ANESTHESIA (OUTPATIENT)
Dept: SURGERY | Facility: HOSPITAL | Age: 67
End: 2022-11-17
Payer: COMMERCIAL

## 2022-11-17 VITALS
DIASTOLIC BLOOD PRESSURE: 91 MMHG | HEIGHT: 63 IN | TEMPERATURE: 97.4 F | RESPIRATION RATE: 18 BRPM | SYSTOLIC BLOOD PRESSURE: 135 MMHG | OXYGEN SATURATION: 98 % | BODY MASS INDEX: 25.69 KG/M2 | HEART RATE: 73 BPM | WEIGHT: 145 LBS

## 2022-11-17 LAB — SARS-COV-2 RNA RESP QL NAA+PROBE: NEGATIVE

## 2022-11-17 PROCEDURE — 999N000141 HC STATISTIC PRE-PROCEDURE NURSING ASSESSMENT: Performed by: SURGERY

## 2022-11-17 PROCEDURE — 250N000009 HC RX 250: Performed by: NURSE ANESTHETIST, CERTIFIED REGISTERED

## 2022-11-17 PROCEDURE — 258N000003 HC RX IP 258 OP 636: Performed by: NURSE ANESTHETIST, CERTIFIED REGISTERED

## 2022-11-17 PROCEDURE — U0003 INFECTIOUS AGENT DETECTION BY NUCLEIC ACID (DNA OR RNA); SEVERE ACUTE RESPIRATORY SYNDROME CORONAVIRUS 2 (SARS-COV-2) (CORONAVIRUS DISEASE [COVID-19]), AMPLIFIED PROBE TECHNIQUE, MAKING USE OF HIGH THROUGHPUT TECHNOLOGIES AS DESCRIBED BY CMS-2020-01-R: HCPCS | Performed by: SURGERY

## 2022-11-17 PROCEDURE — 360N000075 HC SURGERY LEVEL 2, PER MIN: Performed by: SURGERY

## 2022-11-17 PROCEDURE — 370N000017 HC ANESTHESIA TECHNICAL FEE, PER MIN: Performed by: SURGERY

## 2022-11-17 PROCEDURE — 710N000012 HC RECOVERY PHASE 2, PER MINUTE: Performed by: SURGERY

## 2022-11-17 PROCEDURE — G0105 COLORECTAL SCRN; HI RISK IND: HCPCS | Performed by: SURGERY

## 2022-11-17 PROCEDURE — 45378 DIAGNOSTIC COLONOSCOPY: CPT | Performed by: NURSE ANESTHETIST, CERTIFIED REGISTERED

## 2022-11-17 PROCEDURE — 250N000011 HC RX IP 250 OP 636: Performed by: NURSE ANESTHETIST, CERTIFIED REGISTERED

## 2022-11-17 RX ORDER — NALOXONE HYDROCHLORIDE 0.4 MG/ML
0.2 INJECTION, SOLUTION INTRAMUSCULAR; INTRAVENOUS; SUBCUTANEOUS
Status: DISCONTINUED | OUTPATIENT
Start: 2022-11-17 | End: 2022-11-17 | Stop reason: HOSPADM

## 2022-11-17 RX ORDER — FENTANYL CITRATE 50 UG/ML
25 INJECTION, SOLUTION INTRAMUSCULAR; INTRAVENOUS EVERY 5 MIN PRN
Status: DISCONTINUED | OUTPATIENT
Start: 2022-11-17 | End: 2022-11-17 | Stop reason: HOSPADM

## 2022-11-17 RX ORDER — LIDOCAINE HYDROCHLORIDE 20 MG/ML
INJECTION, SOLUTION INFILTRATION; PERINEURAL PRN
Status: DISCONTINUED | OUTPATIENT
Start: 2022-11-17 | End: 2022-11-17

## 2022-11-17 RX ORDER — NALOXONE HYDROCHLORIDE 0.4 MG/ML
0.4 INJECTION, SOLUTION INTRAMUSCULAR; INTRAVENOUS; SUBCUTANEOUS
Status: DISCONTINUED | OUTPATIENT
Start: 2022-11-17 | End: 2022-11-17 | Stop reason: HOSPADM

## 2022-11-17 RX ORDER — OXYCODONE HYDROCHLORIDE 5 MG/1
5 TABLET ORAL EVERY 4 HOURS PRN
Status: DISCONTINUED | OUTPATIENT
Start: 2022-11-17 | End: 2022-11-17 | Stop reason: HOSPADM

## 2022-11-17 RX ORDER — HALOPERIDOL 5 MG/ML
0.5 INJECTION INTRAMUSCULAR
Status: DISCONTINUED | OUTPATIENT
Start: 2022-11-17 | End: 2022-11-17 | Stop reason: HOSPADM

## 2022-11-17 RX ORDER — HYDRALAZINE HYDROCHLORIDE 20 MG/ML
5-10 INJECTION INTRAMUSCULAR; INTRAVENOUS EVERY 10 MIN PRN
Status: DISCONTINUED | OUTPATIENT
Start: 2022-11-17 | End: 2022-11-17 | Stop reason: HOSPADM

## 2022-11-17 RX ORDER — LIDOCAINE 40 MG/G
CREAM TOPICAL
Status: DISCONTINUED | OUTPATIENT
Start: 2022-11-17 | End: 2022-11-17 | Stop reason: HOSPADM

## 2022-11-17 RX ORDER — SODIUM CHLORIDE, SODIUM LACTATE, POTASSIUM CHLORIDE, CALCIUM CHLORIDE 600; 310; 30; 20 MG/100ML; MG/100ML; MG/100ML; MG/100ML
INJECTION, SOLUTION INTRAVENOUS CONTINUOUS
Status: DISCONTINUED | OUTPATIENT
Start: 2022-11-17 | End: 2022-11-17 | Stop reason: HOSPADM

## 2022-11-17 RX ORDER — ONDANSETRON 4 MG/1
4 TABLET, ORALLY DISINTEGRATING ORAL EVERY 30 MIN PRN
Status: DISCONTINUED | OUTPATIENT
Start: 2022-11-17 | End: 2022-11-17 | Stop reason: HOSPADM

## 2022-11-17 RX ORDER — MEPERIDINE HYDROCHLORIDE 25 MG/ML
12.5 INJECTION INTRAMUSCULAR; INTRAVENOUS; SUBCUTANEOUS
Status: DISCONTINUED | OUTPATIENT
Start: 2022-11-17 | End: 2022-11-17 | Stop reason: HOSPADM

## 2022-11-17 RX ORDER — HYDROMORPHONE HYDROCHLORIDE 1 MG/ML
0.2 INJECTION, SOLUTION INTRAMUSCULAR; INTRAVENOUS; SUBCUTANEOUS EVERY 5 MIN PRN
Status: DISCONTINUED | OUTPATIENT
Start: 2022-11-17 | End: 2022-11-17 | Stop reason: HOSPADM

## 2022-11-17 RX ORDER — ONDANSETRON 2 MG/ML
4 INJECTION INTRAMUSCULAR; INTRAVENOUS EVERY 30 MIN PRN
Status: DISCONTINUED | OUTPATIENT
Start: 2022-11-17 | End: 2022-11-17 | Stop reason: HOSPADM

## 2022-11-17 RX ORDER — FENTANYL CITRATE 50 UG/ML
25 INJECTION, SOLUTION INTRAMUSCULAR; INTRAVENOUS
Status: DISCONTINUED | OUTPATIENT
Start: 2022-11-17 | End: 2022-11-17 | Stop reason: HOSPADM

## 2022-11-17 RX ORDER — PROPOFOL 10 MG/ML
INJECTION, EMULSION INTRAVENOUS PRN
Status: DISCONTINUED | OUTPATIENT
Start: 2022-11-17 | End: 2022-11-17

## 2022-11-17 RX ADMIN — LIDOCAINE HYDROCHLORIDE 40 MG: 20 INJECTION, SOLUTION INFILTRATION; PERINEURAL at 14:04

## 2022-11-17 RX ADMIN — PROPOFOL 40 MG: 10 INJECTION, EMULSION INTRAVENOUS at 14:06

## 2022-11-17 RX ADMIN — MIDAZOLAM 2 MG: 1 INJECTION INTRAMUSCULAR; INTRAVENOUS at 14:00

## 2022-11-17 RX ADMIN — SODIUM CHLORIDE, POTASSIUM CHLORIDE, SODIUM LACTATE AND CALCIUM CHLORIDE: 600; 310; 30; 20 INJECTION, SOLUTION INTRAVENOUS at 13:47

## 2022-11-17 RX ADMIN — PROPOFOL 40 MG: 10 INJECTION, EMULSION INTRAVENOUS at 14:04

## 2022-11-17 ASSESSMENT — ACTIVITIES OF DAILY LIVING (ADL)
ADLS_ACUITY_SCORE: 35
ADLS_ACUITY_SCORE: 35

## 2022-11-17 NOTE — OP NOTE
REPORT OF OPERATION  DATE OF PROCEDURE: 11/17/2022    PATIENT: Idalia Hoang    SURGERY PERFORMED: Colonoscopy    PREOPERATIVE DIAGNOSIS: Screening colonoscopy and History of Colon Polyps    POSTOPERATIVE DIAGNOSIS:    Same   Normal colonoscopy   Diverticulosis was identified.   Hemorrhoids  were  identified.    SURGEON: Eduardo Subramanian MD    ASSISTANTS: None    ANESTHESIA: Monitored Anesthesia Care    COMPLICATIONS: None apparent    TRANSFUSIONS: None    TISSUE TO PATHOLOGY: None    FINDINGS: Normal colonoscopy.  Diverticulosis was identified.  Hemorrhoids  were  identified.    INDICATIONS: This is a 66 year old female in need of a colonoscopy for Screening colonoscopy.  The patient will be taken to the endoscopy suite for that procedure.    DESCRIPTIONS OF PROCEDURE IN DETAIL: After consent was obtained the patient was taken to the endoscopy suite and placed in the left lateral decubitus position.  The patient was identified and the correct patient was confirmed.  Monitored Anesthesia Care was given.  A time out was performed verifying the correct patient and the correct procedure.  The entire operative team was in agreement.  All necessary equipment and supplies were in the room.    Rectal exam was performed and no lesions of the anal canal were noted.  The colonoscope was inserted into the anus and passed without difficulty to the cecum.  The cecum was identified by the ileocecal valve, the coalescence of the tinea and the appendiceal orifice.  Upon withdrawal all walls of the colon were visualized.  There were no polyps, masses or evidence of colitis seen.  Diverticulosis was seen.  Upon reaching the rectum the scope was retroflexed and internal hemorrhoids were not  seen.  The scope was straightened back out and removed from the patient.  The patient was then taken to the recovery room in stable condition tolerating the procedure well.      Prep: good    Withdrawal time was 6 minutes.    It is recommended  that the patient have another colonoscopy in 5 years.

## 2022-11-17 NOTE — ANESTHESIA CARE TRANSFER NOTE
Patient: Idalia Hoang    Procedure: Procedure(s):  COLONOSCOPY       Diagnosis: History of colonic polyps [Z86.010]  Diagnosis Additional Information: No value filed.    Anesthesia Type:   MAC     Note:    Oropharynx: oropharynx clear of all foreign objects and spontaneously breathing  Level of Consciousness: drowsy  Oxygen Supplementation: nasal cannula  Level of Supplemental Oxygen (L/min / FiO2): 2  Independent Airway: airway patency satisfactory and stable  Dentition: dentition unchanged  Vital Signs Stable: post-procedure vital signs reviewed and stable  Report to RN Given: handoff report given  Patient transferred to: Phase II    Handoff Report: Identifed the Patient, Identified the Reponsible Provider, Reviewed the pertinent medical history, Discussed the surgical course, Reviewed Intra-OP anesthesia mangement and issues during anesthesia, Set expectations for post-procedure period and Allowed opportunity for questions and acknowledgement of understanding      Vitals:  Vitals Value Taken Time   /71 11/17/22 1419   Temp     Pulse 76 11/17/22 1419   Resp     SpO2 100 % 11/17/22 1420   Vitals shown include unvalidated device data.    Electronically Signed By: STACEY Pisano CRNA  November 17, 2022  2:20 PM

## 2022-11-17 NOTE — DISCHARGE INSTRUCTIONS

## 2022-11-17 NOTE — ANESTHESIA POSTPROCEDURE EVALUATION
Patient: Idalai Hoang    Procedure: Procedure(s):  COLONOSCOPY       Anesthesia Type:  MAC    Note:  Disposition: Outpatient   Postop Pain Control: Uneventful            Sign Out: Well controlled pain   PONV: No   Neuro/Psych: Uneventful            Sign Out: Acceptable/Baseline neuro status   Airway/Respiratory: Uneventful            Sign Out: Acceptable/Baseline resp. status   CV/Hemodynamics: Uneventful            Sign Out: Acceptable CV status; No obvious hypovolemia; No obvious fluid overload   Other NRE: NONE   DID A NON-ROUTINE EVENT OCCUR? No           Last vitals:  Vitals Value Taken Time   /91 11/17/22 1450   Temp 97.4  F (36.3  C) 11/17/22 1450   Pulse 73 11/17/22 1445   Resp 18 11/17/22 1450   SpO2 99 % 11/17/22 1451   Vitals shown include unvalidated device data.    Electronically Signed By: STACEY Harding CRNA  November 17, 2022  3:20 PM

## 2022-11-17 NOTE — INTERVAL H&P NOTE
"I have reviewed the surgical (or preoperative) H&P that is linked to this encounter, and examined the patient. There are no significant changes    Clinical Conditions Present on Arrival:  Clinically Significant Risk Factors Present on Admission                    # Overweight: Estimated body mass index is 25.69 kg/m  as calculated from the following:    Height as of this encounter: 1.6 m (5' 3\").    Weight as of this encounter: 65.8 kg (145 lb).       "

## 2022-11-17 NOTE — OR NURSING
Patient and responsible adult given discharge instructions with no questions regarding instructions. Daniel score 18/18. Pain level 0/10.  Discharged from unit via ambulation. Patient discharged to home with bro-in-law.

## 2022-12-19 DIAGNOSIS — E03.9 ACQUIRED HYPOTHYROIDISM: ICD-10-CM

## 2022-12-20 RX ORDER — LEVOTHYROXINE SODIUM 88 UG/1
TABLET ORAL
Qty: 90 TABLET | Refills: 0 | Status: SHIPPED | OUTPATIENT
Start: 2022-12-20 | End: 2023-03-24

## 2023-03-16 DIAGNOSIS — F33.9 EPISODE OF RECURRENT MAJOR DEPRESSIVE DISORDER, UNSPECIFIED DEPRESSION EPISODE SEVERITY (H): ICD-10-CM

## 2023-03-17 NOTE — TELEPHONE ENCOUNTER
venlafaxine (EFFEXOR XR) 75 MG 24 hr capsule     Last Written Prescription Date:  1/17/2022  Last Fill Quantity: 90,   # refills: 3  Last Office Visit: 3/16/2022  Future Office visit:    Next 5 appointments (look out 90 days)    Mar 22, 2023  2:30 PM  (Arrive by 2:15 PM)  PHYSICAL with MARCOS Nelson  [facility] ([facility] ) [address]  [place] [address]  [phone]

## 2023-03-21 RX ORDER — VENLAFAXINE HYDROCHLORIDE 75 MG/1
CAPSULE, EXTENDED RELEASE ORAL
Qty: 90 CAPSULE | Refills: 0 | Status: SHIPPED | OUTPATIENT
Start: 2023-03-21 | End: 2023-06-12

## 2023-03-22 ENCOUNTER — OFFICE VISIT (OUTPATIENT)
Dept: FAMILY MEDICINE | Facility: OTHER | Age: 68
End: 2023-03-22
Attending: PHYSICIAN ASSISTANT
Payer: COMMERCIAL

## 2023-03-22 VITALS
OXYGEN SATURATION: 97 % | DIASTOLIC BLOOD PRESSURE: 75 MMHG | TEMPERATURE: 98.2 F | RESPIRATION RATE: 16 BRPM | BODY MASS INDEX: 26.39 KG/M2 | SYSTOLIC BLOOD PRESSURE: 112 MMHG | WEIGHT: 149 LBS | HEART RATE: 87 BPM

## 2023-03-22 DIAGNOSIS — R41.3 MEMORY LOSS: ICD-10-CM

## 2023-03-22 DIAGNOSIS — E03.9 ACQUIRED HYPOTHYROIDISM: ICD-10-CM

## 2023-03-22 DIAGNOSIS — E53.8 VITAMIN B12 DEFICIENCY (NON ANEMIC): ICD-10-CM

## 2023-03-22 DIAGNOSIS — M35.00 SICCA SYNDROME (H): ICD-10-CM

## 2023-03-22 DIAGNOSIS — Z12.31 ENCOUNTER FOR SCREENING MAMMOGRAM FOR BREAST CANCER: ICD-10-CM

## 2023-03-22 DIAGNOSIS — F33.9 EPISODE OF RECURRENT MAJOR DEPRESSIVE DISORDER, UNSPECIFIED DEPRESSION EPISODE SEVERITY (H): ICD-10-CM

## 2023-03-22 DIAGNOSIS — Z00.00 MEDICARE ANNUAL WELLNESS VISIT, INITIAL: Primary | ICD-10-CM

## 2023-03-22 DIAGNOSIS — Z00.00 ENCOUNTER FOR MEDICARE ANNUAL WELLNESS EXAM: ICD-10-CM

## 2023-03-22 PROCEDURE — G0438 PPPS, INITIAL VISIT: HCPCS | Performed by: PHYSICIAN ASSISTANT

## 2023-03-22 PROCEDURE — G0463 HOSPITAL OUTPT CLINIC VISIT: HCPCS

## 2023-03-22 ASSESSMENT — ENCOUNTER SYMPTOMS
HEARTBURN: 0
SHORTNESS OF BREATH: 0
ABDOMINAL PAIN: 0
JOINT SWELLING: 1
HEMATOCHEZIA: 0
NAUSEA: 0
EYE PAIN: 0
DIZZINESS: 0
FEVER: 0
WEAKNESS: 0
HEMATURIA: 0
PARESTHESIAS: 0
PALPITATIONS: 0
CONSTIPATION: 0
COUGH: 0
CHILLS: 0
ARTHRALGIAS: 0
DYSURIA: 0
SORE THROAT: 0
NERVOUS/ANXIOUS: 0
BREAST MASS: 0
DIARRHEA: 0
FREQUENCY: 0
HEADACHES: 0
MYALGIAS: 0

## 2023-03-22 ASSESSMENT — PAIN SCALES - GENERAL: PAINLEVEL: NO PAIN (0)

## 2023-03-22 ASSESSMENT — ANXIETY QUESTIONNAIRES
2. NOT BEING ABLE TO STOP OR CONTROL WORRYING: NOT AT ALL
GAD7 TOTAL SCORE: 0
1. FEELING NERVOUS, ANXIOUS, OR ON EDGE: NOT AT ALL
GAD7 TOTAL SCORE: 0
IF YOU CHECKED OFF ANY PROBLEMS ON THIS QUESTIONNAIRE, HOW DIFFICULT HAVE THESE PROBLEMS MADE IT FOR YOU TO DO YOUR WORK, TAKE CARE OF THINGS AT HOME, OR GET ALONG WITH OTHER PEOPLE: SOMEWHAT DIFFICULT
7. FEELING AFRAID AS IF SOMETHING AWFUL MIGHT HAPPEN: NOT AT ALL
7. FEELING AFRAID AS IF SOMETHING AWFUL MIGHT HAPPEN: NOT AT ALL
GAD7 TOTAL SCORE: 0
4. TROUBLE RELAXING: NOT AT ALL
8. IF YOU CHECKED OFF ANY PROBLEMS, HOW DIFFICULT HAVE THESE MADE IT FOR YOU TO DO YOUR WORK, TAKE CARE OF THINGS AT HOME, OR GET ALONG WITH OTHER PEOPLE?: SOMEWHAT DIFFICULT
3. WORRYING TOO MUCH ABOUT DIFFERENT THINGS: NOT AT ALL
6. BECOMING EASILY ANNOYED OR IRRITABLE: NOT AT ALL
5. BEING SO RESTLESS THAT IT IS HARD TO SIT STILL: NOT AT ALL

## 2023-03-22 ASSESSMENT — ACTIVITIES OF DAILY LIVING (ADL): CURRENT_FUNCTION: HOUSEWORK REQUIRES ASSISTANCE

## 2023-03-22 ASSESSMENT — PATIENT HEALTH QUESTIONNAIRE - PHQ9
SUM OF ALL RESPONSES TO PHQ QUESTIONS 1-9: 4
SUM OF ALL RESPONSES TO PHQ QUESTIONS 1-9: 4
10. IF YOU CHECKED OFF ANY PROBLEMS, HOW DIFFICULT HAVE THESE PROBLEMS MADE IT FOR YOU TO DO YOUR WORK, TAKE CARE OF THINGS AT HOME, OR GET ALONG WITH OTHER PEOPLE: SOMEWHAT DIFFICULT

## 2023-03-22 NOTE — PROGRESS NOTES
"Answers for HPI/ROS submitted by the patient on 3/22/2023  If you checked off any problems, how difficult have these problems made it for you to do your work, take care of things at home, or get along with other people?: Somewhat difficult  PHQ9 TOTAL SCORE: 4  BRADLY 7 TOTAL SCORE: 0    SUBJECTIVE:   Idalia is a 67 year old who presents for Preventive Visit.  No flowsheet data found.  Patient has been advised of split billing requirements and indicates understanding: Yes  Are you in the first 12 months of your Medicare coverage?  No    Healthy Habits:     In general, how would you rate your overall health?  Good    Frequency of exercise:  2-3 days/week    Duration of exercise:  30-45 minutes    Do you usually eat at least 4 servings of fruit and vegetables a day, include whole grains    & fiber and avoid regularly eating high fat or \"junk\" foods?  No    Taking medications regularly:  Yes    Medication side effects:  None    Ability to successfully perform activities of daily living:  Housework requires assistance    Home Safety:  Throw rugs in the hallway    Hearing Impairment:  No hearing concerns    In the past 6 months, have you been bothered by leaking of urine?  No    In general, how would you rate your overall mental or emotional health?  Good      PHQ-2 Total Score: 1    Additional concerns today:  No      Have you ever done Advance Care Planning? (For example, a Health Directive, POLST, or a discussion with a medical provider or your loved ones about your wishes): Yes, patient states has an Advance Care Planning document and will bring a copy to the clinic.       Fall risk  Fallen 2 or more times in the past year?: No  Any fall with injury in the past year?: No    Cognitive Screening   1) Repeat 3 items (Leader, Season, Table)    2) Clock draw: NORMAL  3) 3 item recall: Recalls 1 object   Results: NORMAL clock, 1-2 items recalled: COGNITIVE IMPAIRMENT LESS LIKELY    Mini-CogTM Copyright S Abimb[employer]. Licensed by " the author for use in [facility]; reprinted with permission (normaveronica@[facility]). All rights reserved.      Do you have sleep apnea, excessive snoring or daytime drowsiness?: yes    Reviewed and updated as needed this visit by clinical staff   Tobacco  Allergies  Meds              Reviewed and updated as needed this visit by Provider                 Social History     Tobacco Use     Smoking status: Former     Packs/day: 0.30     Years: 1.00     Pack years: 0.30     Types: Cigarettes     Smokeless tobacco: Never     Tobacco comments:     quit 1989; no passive exposure   Substance Use Topics     Alcohol use: Yes     Comment: 1 drink beer and wine daily         Alcohol Use 3/22/2023   Prescreen: >3 drinks/day or >7 drinks/week? No     Do you have a current opioid prescription? No  Do you use any other controlled substances or medications that are not prescribed by a provider? None              Current providers sharing in care for this patient include:   Patient Care Team:  Love Noriega PA as PCP - General (Family Practice)  Yariel Leger DPM (Podiatry)  Alonso Rivera MD as MD (Dermapathology)  Dawson Silver MD as MD (Dermatology)  Jameson Thompson MD (Ophthalmology)  Gera Tsang OD as MD (Optometry)  Love Noriega PA as Assigned PCP  Jaye Ferreira NP as Assigned Surgical Provider    The following health maintenance items are reviewed in Epic and correct as of today:  Health Maintenance   Topic Date Due     LUNG CANCER SCREENING  Never done     DTAP/TDAP/TD IMMUNIZATION (2 - Td or Tdap) 03/08/2022     INFLUENZA VACCINE (1) 09/01/2022     MEDICARE ANNUAL WELLNESS VISIT  01/17/2023     LIPID  01/18/2023     TSH W/FREE T4 REFLEX  01/18/2023     Pneumococcal Vaccine: 65+ Years (2 - PCV) 01/17/2023     BRADLY ASSESSMENT  09/22/2023     PHQ-9  09/22/2023     MAMMO SCREENING  02/28/2024     FALL RISK ASSESSMENT  03/22/2024     ADVANCE CARE PLANNING  01/20/2027      COLORECTAL CANCER SCREENING  11/17/2032     DEXA  02/28/2037     HEPATITIS C SCREENING  Completed     DEPRESSION ACTION PLAN  Completed     ZOSTER IMMUNIZATION  Completed     COVID-19 Vaccine  Completed     IPV IMMUNIZATION  Aged Out     MENINGITIS IMMUNIZATION  Aged Out     PAP  Discontinued     Labs reviewed in EPIC  BP Readings from Last 3 Encounters:   03/22/23 112/75   11/17/22 135/91   10/18/22 116/64    Wt Readings from Last 3 Encounters:   03/22/23 67.6 kg (149 lb)   11/17/22 65.8 kg (145 lb)   10/18/22 65.8 kg (145 lb)                  Patient Active Problem List   Diagnosis     Advanced care planning/counseling discussion     Lichen simplex chronicus     Acquired hypothyroidism     Estradiol deficiency     Mixed hyperlipidemia     ACP (advance care planning)     Family history of melanoma     Episode of recurrent major depressive disorder, unspecified depression episode severity (H)     Past Surgical History:   Procedure Laterality Date     COLONOSCOPY  04/20/2012     COLONOSCOPY N/A 11/17/2022    Procedure: COLONOSCOPY;  Surgeon: Eduardo Subramanian MD;  Location: [facility] OR     PHACOEMULSIFICATION WITH STANDARD INTRAOCULAR LENS IMPLANT Left 4/12/2022    Procedure: PHACOEMULSIFICATION CATARACT EXTRACTION POSTERIOR CHAMBER LENS LEFT EYE;  Surgeon: Tremayne Crawford MD;  Location: [facility] OR     surgical removal  2010    tendon nodule; free of disease       Social History     Tobacco Use     Smoking status: Former     Packs/day: 0.30     Years: 1.00     Pack years: 0.30     Types: Cigarettes     Smokeless tobacco: Never     Tobacco comments:     quit 1989; no passive exposure   Substance Use Topics     Alcohol use: Yes     Comment: 1 drink beer and wine daily     Family History   Problem Relation Age of Onset     Hypertension Sister      Melanoma Sister      C.A.D. Other         grandmother     Cancer Other         lung; grandmother/leukemia; grandfather     Depression Other         family h/o     Hypertension  Other         grandmother     Breast Cancer Sister 62     Other - See Comments Mother         memory loss/sjogrensyndrome/stomach     Osteoporosis Mother      Alzheimer Disease Mother      Hyperlipidemia Sister      Diabetes Father      Coronary Artery Disease Father         a fib     Thyroid Disease Sister      Asthma Brother          Current Outpatient Medications   Medication Sig Dispense Refill     levothyroxine (SYNTHROID/LEVOTHROID) 88 MCG tablet Take 1 tablet by mouth once daily 90 tablet 0     venlafaxine (EFFEXOR XR) 75 MG 24 hr capsule Take 1 capsule by mouth once daily 90 capsule 0     No Known Allergies  Pneumonia Vaccine:For adults 65 years or older who do not have an immunocompromising condition, cerebrospinal fluid leak, or cochlear implant and want to receive PPSV23 ONLY: Administer 1 dose of PPSV23. Anyone who received any doses of PPSV23 before age 65 should receive 1 final dose of the vaccine at age 65 or older. Administer this last dose at least 5 years after the prior PPSV23 dose.  Mammogram Screening: Mammogram Screening: Recommended mammography every 1-2 years with patient discussion and risk factor consideration  History of abnormal Pap smear:   Last 3 Pap Results:   PAP (no units)   Date Value   10/23/2017 NIL   04/14/2014 NIL       Breast CA Risk Assessment (FHS-7) 1/17/2022   Do you have a family history of breast, colon, or ovarian cancer? No / Unknown         Mammogram Screening: Recommended mammography every 1-2 years with patient discussion and risk factor consideration  Pertinent mammograms are reviewed under the imaging tab.    Review of Systems   Constitutional: Negative for chills and fever.   HENT: Positive for congestion. Negative for ear pain, hearing loss and sore throat.    Eyes: Negative for pain and visual disturbance.   Respiratory: Negative for cough and shortness of breath.    Cardiovascular: Negative for chest pain, palpitations and peripheral edema.   Gastrointestinal:  "Negative for abdominal pain, constipation, diarrhea, heartburn, hematochezia and nausea.   Breasts:  Negative for tenderness, breast mass and discharge.   Genitourinary: Negative for dysuria, frequency, genital sores, hematuria, pelvic pain, urgency, vaginal bleeding and vaginal discharge.   Musculoskeletal: Positive for joint swelling. Negative for arthralgias and myalgias.   Skin: Negative for rash.   Neurological: Negative for dizziness, weakness, headaches and paresthesias.   Psychiatric/Behavioral: Negative for mood changes. The patient is not nervous/anxious.          OBJECTIVE:   /75 (BP Location: Left arm, Patient Position: Sitting, Cuff Size: Adult Regular)   Pulse 87   Temp 98.2  F (36.8  C) (Tympanic)   Resp 16   Wt 67.6 kg (149 lb)   SpO2 97%   BMI 26.39 kg/m   Estimated body mass index is 26.39 kg/m  as calculated from the following:    Height as of 11/17/22: 1.6 m (5' 3\").    Weight as of this encounter: 67.6 kg (149 lb).  Physical Exam  GENERAL: healthy, alert and no distress  GENERAL: reviewed all her labs and previous testing.   HENT: ear canals and TM's normal, nose and mouth without ulcers or lesions  NECK: no adenopathy, no asymmetry, masses, or scars and thyroid normal to palpation  RESP: lungs clear to auscultation - no rales, rhonchi or wheezes  BREAST: normal without masses, tenderness or nipple discharge and no palpable axillary masses or adenopathy  CV: regular rate and rhythm, normal S1 S2, no S3 or S4, no murmur, click or rub, no peripheral edema and peripheral pulses strong  ABDOMEN: soft, nontender, no hepatosplenomegaly, no masses and bowel sounds normal  MS: no gross musculoskeletal defects noted, no edema  SKIN: no suspicious lesions or rashes  NEURO: Normal strength and tone, mentation intact and speech normal  BACK: no CVA tenderness, no paralumbar tenderness  PSYCH: mentation appears normal, affect normal/bright, shows mild cognitive decline can only recall one item. " "  LYMPH: no cervical, supraclavicular, axillary, or inguinal adenopathy    Diagnostic Test Results:  Labs reviewed in Epic  No results found for this or any previous visit (from the past 24 hour(s)).    ASSESSMENT / PLAN:       ICD-10-CM    1. Medicare annual wellness visit, initial  Z00.00       2. Episode of recurrent major depressive disorder, unspecified depression episode severity (H)  F33.9       3. Acquired hypothyroidism  E03.9 TSH with free T4 reflex     CBC with platelets and differential     Comprehensive metabolic panel (BMP + Alb, Alk Phos, ALT, AST, Total. Bili, TP)      4. Vitamin B12 deficiency (non anemic)  E53.8 Vitamin B12     CBC with platelets and differential     Comprehensive metabolic panel (BMP + Alb, Alk Phos, ALT, AST, Total. Bili, TP)      5. Sicca syndrome (H)  M35.00 Anti Nuclear Sarah Beth IgG by IFA with Reflex     SSA Ro KAYLA Antibody IgG     SSB La KAYLA Antibody IgG     CBC with platelets and differential     Comprehensive metabolic panel (BMP + Alb, Alk Phos, ALT, AST, Total. Bili, TP)      6. Memory loss  R41.3 Adult Neurology  Referral     MR Brain w/o & w Contrast     CBC with platelets and differential     Comprehensive metabolic panel (BMP + Alb, Alk Phos, ALT, AST, Total. Bili, TP)      7. Encounter for screening mammogram for breast cancer  Z12.31 MA Screen Bilateral w/Thaddeus      8. Encounter for Medicare annual wellness exam  Z00.00                 COUNSELING:       Regular exercise       Healthy diet/nutrition       Vision screening       Hearing screening       Dental care       Bladder control       Fall risk prevention       Osteoporosis prevention/bone health       Advanced Planning        pt has never been a smoker and this was completed on her plan. non smoker should be placed.       BMI:   Estimated body mass index is 26.39 kg/m  as calculated from the following:    Height as of 11/17/22: 1.6 m (5' 3\").    Weight as of this encounter: 67.6 kg (149 lb).         She " reports that she has quit smoking. Her smoking use included cigarettes. She has a 0.30 pack-year smoking history. She has never used smokeless tobacco.      Appropriate preventive services were discussed with this patient, including applicable screening as appropriate for cardiovascular disease, diabetes, osteopenia/osteoporosis, and glaucoma.  As appropriate for age/gender, discussed screening for colorectal cancer, prostate cancer, breast cancer, and cervical cancer. Checklist reviewing preventive services available has been given to the patient.    Reviewed patients plan of care and provided an AVS. The Intermediate Care Plan ( asthma action plan, low back pain action plan, and migraine action plan) for Idalia meets the Care Plan requirement. This Care Plan has been established and reviewed with the Patient.          MARCOS John  [facility]    Identified Health Risks:      The patient was counseled and encouraged to consider modifying their diet and eating habits. She was provided with information on recommended healthy diet options.  The patient reports that she has difficulty with activities of daily living. I have asked that the patient make a follow up appointment in 4  weeks where this issue will be further evaluated and addressed.this is related to her memory.  We will have her evaluated for memory loss. Brother with Alzheimer and mom  of memory loss.     Idalia Hoang, a 67 year old female, is eligible for lung cancer screening    History   Smoking Status     Former     Packs/day: 0.30     Years: 1.00     Types: Cigarettes   Smokeless Tobacco     Never       I have discussed with patient the risks and benefits of screening for lung cancer with low-dose CT.     The risks include:    radiation exposure: one low dose chest CT has as much ionizing radiation as about 15 chest x-rays, or 6 months of background radiation living in [place]      false positives: most  findings/nodules are NOT cancer, but some might still require additional diagnostic evaluation, including biopsy    over-diagnosis: some slow growing cancers that might never have been clinically significant will be detected and treated unnecessarily     The benefit of early detection of lung cancer is contingent upon adherence to annual screening or more frequent follow up if indicated.     Furthermore, to benefit from screening, Idalia must be willing and able to undergo diagnostic procedures, if indicated. Although no specific guide is available for determining severity of comorbidities, it is reasonable to withhold screening in patients who have greater mortality risk from other diseases.     We did discuss that the best way to prevent lung cancer is to not smoke.    Some patients may value a numeric estimation of lung cancer risk when evaluating if lung cancer screening is right for them, here is one calculator:    ShouldIScvishnu    Idalia KIKE Hoang has declined screening at this time

## 2023-03-22 NOTE — PATIENT INSTRUCTIONS
Patient Education   Personalized Prevention Plan  You are due for the preventive services outlined below.  Your care team is available to assist you in scheduling these services.  If you have already completed any of these items, please share that information with your care team to update in your medical record.  Health Maintenance Due   Topic Date Due     LUNG CANCER SCREENING  Never done     Diptheria Tetanus Pertussis (DTAP/TDAP/TD) Vaccine (2 - Td or Tdap) 03/08/2022     Flu Vaccine (1) 09/01/2022     Annual Wellness Visit  01/17/2023     Cholesterol Lab  01/18/2023     Thyroid Function Lab  01/18/2023     Pneumococcal Vaccine (2 - PCV) 01/17/2023       Understanding USDA MyPlate  The USDA has guidelines to help you make healthy food choices. These are called MyPlate. MyPlate shows the food groups that make up healthy meals using the image of a place setting. Before you eat, think about the healthiest choices for what to put on your plate or in your cup or bowl. To learn more about building a healthy plate, visit www.choosemyplate.gov.     The food groups    Fruits. Any fruit or 100% fruit juice counts as part of the Fruit Group. Fruits may be fresh, canned, frozen, or dried, and may be whole, cut-up, or pureed. Make 1/2 of your plate fruits and vegetables.    Vegetables. Any vegetable or 100% vegetable juice counts as a member of the Vegetable Group. Vegetables may be fresh, frozen, canned, or dried. They can be served raw or cooked and may be whole, cut-up, or mashed. Make 1/2 of your plate fruits and vegetables.    Grains. All foods made from grains are part of the Grains Group. These include wheat, rice, oats, cornmeal, and barley. Grains are often used to make foods such as bread, pasta, oatmeal, cereal, tortillas, and grits. Grains should be no more than 1/4 of your plate. At least half of your grains should be whole grains.    Protein. This group includes meat, poultry, seafood, beans and peas, eggs,  processed soy products (such as tofu), nuts (including nut butters), and seeds. Make protein choices no more than 1/4 of your plate. Meat and poultry choices should be lean or low fat.    Dairy. The Dairy Group includes all fluid milk products and foods made from milk that contain calcium, such as yogurt and cheese. (Foods that have little calcium, such as cream, butter, and cream cheese, are not part of this group.) Most dairy choices should be low-fat or fat-free.    Oils. Oils aren't a food group, but they do contain essential nutrients. However it's important to watch your intake of oils. These are fats that are liquid at room temperature. They include canola, corn, olive, soybean, vegetable, and sunflower oil. Foods that are mainly oil include mayonnaise, certain salad dressings, and soft margarines. You likely already get your daily oil allowance from the foods you eat.  Things to limit  Eating healthy also means limiting these things in your diet:    Salt (sodium). Many processed foods have a lot of sodium. To keep sodium intake down, eat fresh vegetables, meats, poultry, and seafood when possible. Purchase low-sodium, reduced-sodium, or no-salt-added food products at the store. And don't add salt to your meals at home. Instead, season them with herbs and spices such as dill, oregano, cumin, and paprika. Or try adding flavor with lemon or lime zest and juice.    Saturated fat. Saturated fats are most often found in animal products such as beef, pork, and chicken. They are often solid at room temperature, such as butter. To reduce your saturated fat intake, choose leaner cuts of meat and poultry. And try healthier cooking methods such as grilling, broiling, roasting, or baking. For a simple lower-fat swap, use plain nonfat yogurt instead of mayonnaise when making potato salad or macaroni salad.    Added sugars. These are sugars added to foods. They are in foods such as ice cream, candy, soda, fruit drinks,  sports drinks, energy drinks, cookies, pastries, jams, and syrups. Cut down on added sugars by sharing sweet treats with a family member or friend. You can also choose fruit for dessert, and drink water or other unsweetened beverages.  [employer] last reviewed this educational content on 6/1/2020 2000-2022 [employer]. All rights reserved. This information is not intended as a substitute for professional medical care. Always follow your healthcare professional's instructions.        Activities of Daily Living    Your Health Risk Assessment indicates you have difficulties with activities of daily living such as housework, bathing, preparing meals, taking medication, etc. Please make a follow up appointment for us to address this issue in more detail.

## 2023-03-23 ENCOUNTER — LAB (OUTPATIENT)
Dept: LAB | Facility: OTHER | Age: 68
End: 2023-03-23
Payer: COMMERCIAL

## 2023-03-23 DIAGNOSIS — E03.9 ACQUIRED HYPOTHYROIDISM: ICD-10-CM

## 2023-03-23 DIAGNOSIS — R41.3 MEMORY LOSS: ICD-10-CM

## 2023-03-23 DIAGNOSIS — M35.00 SICCA SYNDROME (H): ICD-10-CM

## 2023-03-23 DIAGNOSIS — E53.8 VITAMIN B12 DEFICIENCY (NON ANEMIC): ICD-10-CM

## 2023-03-23 LAB
ALBUMIN SERPL BCG-MCNC: 4.3 G/DL (ref 3.5–5.2)
ALP SERPL-CCNC: 68 U/L (ref 35–104)
ALT SERPL W P-5'-P-CCNC: 15 U/L (ref 10–35)
ANION GAP SERPL CALCULATED.3IONS-SCNC: 8 MMOL/L (ref 7–15)
AST SERPL W P-5'-P-CCNC: 21 U/L (ref 10–35)
BASOPHILS # BLD AUTO: 0 10E3/UL (ref 0–0.2)
BASOPHILS NFR BLD AUTO: 1 %
BILIRUB SERPL-MCNC: 0.2 MG/DL
BUN SERPL-MCNC: 18.3 MG/DL (ref 8–23)
CALCIUM SERPL-MCNC: 9.5 MG/DL (ref 8.8–10.2)
CHLORIDE SERPL-SCNC: 106 MMOL/L (ref 98–107)
CREAT SERPL-MCNC: 0.81 MG/DL (ref 0.51–0.95)
DEPRECATED HCO3 PLAS-SCNC: 26 MMOL/L (ref 22–29)
EOSINOPHIL # BLD AUTO: 0.4 10E3/UL (ref 0–0.7)
EOSINOPHIL NFR BLD AUTO: 5 %
ERYTHROCYTE [DISTWIDTH] IN BLOOD BY AUTOMATED COUNT: 14 % (ref 10–15)
GFR SERPL CREATININE-BSD FRML MDRD: 80 ML/MIN/1.73M2
GLUCOSE SERPL-MCNC: 108 MG/DL (ref 70–99)
HCT VFR BLD AUTO: 41 % (ref 35–47)
HGB BLD-MCNC: 13.6 G/DL (ref 11.7–15.7)
IMM GRANULOCYTES # BLD: 0 10E3/UL
IMM GRANULOCYTES NFR BLD: 0 %
LYMPHOCYTES # BLD AUTO: 2.6 10E3/UL (ref 0.8–5.3)
LYMPHOCYTES NFR BLD AUTO: 39 %
MCH RBC QN AUTO: 32.3 PG (ref 26.5–33)
MCHC RBC AUTO-ENTMCNC: 33.2 G/DL (ref 31.5–36.5)
MCV RBC AUTO: 97 FL (ref 78–100)
MONOCYTES # BLD AUTO: 0.5 10E3/UL (ref 0–1.3)
MONOCYTES NFR BLD AUTO: 8 %
NEUTROPHILS # BLD AUTO: 3.1 10E3/UL (ref 1.6–8.3)
NEUTROPHILS NFR BLD AUTO: 47 %
NRBC # BLD AUTO: 0 10E3/UL
NRBC BLD AUTO-RTO: 0 /100
PLATELET # BLD AUTO: 250 10E3/UL (ref 150–450)
POTASSIUM SERPL-SCNC: 3.7 MMOL/L (ref 3.4–5.3)
PROT SERPL-MCNC: 7.3 G/DL (ref 6.4–8.3)
RBC # BLD AUTO: 4.21 10E6/UL (ref 3.8–5.2)
SODIUM SERPL-SCNC: 140 MMOL/L (ref 136–145)
TSH SERPL DL<=0.005 MIU/L-ACNC: 1.98 UIU/ML (ref 0.3–4.2)
VIT B12 SERPL-MCNC: 478 PG/ML (ref 232–1245)
WBC # BLD AUTO: 6.7 10E3/UL (ref 4–11)

## 2023-03-23 PROCEDURE — 84443 ASSAY THYROID STIM HORMONE: CPT | Mod: ZL

## 2023-03-23 PROCEDURE — 82607 VITAMIN B-12: CPT | Mod: ZL

## 2023-03-23 PROCEDURE — 36415 COLL VENOUS BLD VENIPUNCTURE: CPT | Mod: ZL

## 2023-03-23 PROCEDURE — 80053 COMPREHEN METABOLIC PANEL: CPT | Mod: ZL

## 2023-03-23 PROCEDURE — 85025 COMPLETE CBC W/AUTO DIFF WBC: CPT | Mod: ZL

## 2023-03-23 PROCEDURE — 86038 ANTINUCLEAR ANTIBODIES: CPT | Mod: ZL

## 2023-03-23 PROCEDURE — 86235 NUCLEAR ANTIGEN ANTIBODY: CPT | Mod: ZL

## 2023-03-24 LAB
ANA PAT SER IF-IMP: ABNORMAL
ANA SER QL IF: ABNORMAL
ANA TITR SER IF: ABNORMAL {TITER}
ENA SS-A AB SER IA-ACNC: <0.5 U/ML
ENA SS-A AB SER IA-ACNC: NEGATIVE
ENA SS-B IGG SER IA-ACNC: 0.9 U/ML
ENA SS-B IGG SER IA-ACNC: NEGATIVE

## 2023-03-24 RX ORDER — LEVOTHYROXINE SODIUM 88 UG/1
TABLET ORAL
Qty: 90 TABLET | Refills: 0 | Status: SHIPPED | OUTPATIENT
Start: 2023-03-24 | End: 2023-06-15

## 2023-03-24 NOTE — TELEPHONE ENCOUNTER
Levothyroxine 88 mcg      Last Written Prescription Date:  12-20-22  Last Fill Quantity: 90,   # refills: 0  Last Office Visit: 3-22-22

## 2023-03-24 NOTE — RESULT ENCOUNTER NOTE
Would like to see  how she feels with B12 supplement.  Can get an injection or we can try oral , see if it helps her foggy brain complaints.  (Poor attention, poor recall) glucose mildly elevated.  Watch carbs.

## 2023-03-30 ENCOUNTER — TELEPHONE (OUTPATIENT)
Dept: GENERAL RADIOLOGY | Facility: HOSPITAL | Age: 68
End: 2023-03-30

## 2023-03-30 ENCOUNTER — ANCILLARY PROCEDURE (OUTPATIENT)
Dept: MAMMOGRAPHY | Facility: OTHER | Age: 68
End: 2023-03-30
Attending: PHYSICIAN ASSISTANT
Payer: COMMERCIAL

## 2023-03-30 DIAGNOSIS — Z12.31 ENCOUNTER FOR SCREENING MAMMOGRAM FOR BREAST CANCER: ICD-10-CM

## 2023-03-30 DIAGNOSIS — E78.5 HYPERLIPIDEMIA LDL GOAL <100: Primary | ICD-10-CM

## 2023-03-30 PROCEDURE — 77067 SCR MAMMO BI INCL CAD: CPT | Mod: TC

## 2023-03-30 NOTE — TELEPHONE ENCOUNTER
Patient will check my chart for her mammo results.  She does not have a phone that is active at this time.

## 2023-03-31 NOTE — RESULT ENCOUNTER NOTE
Lipid was to be done fasting.  She can come do this at any time.  Her appointment was in the afternoon. Orders are in there.

## 2023-04-10 ENCOUNTER — HOSPITAL ENCOUNTER (OUTPATIENT)
Dept: MRI IMAGING | Facility: HOSPITAL | Age: 68
Discharge: HOME | End: 2023-04-10
Attending: PHYSICIAN ASSISTANT | Admitting: PHYSICIAN ASSISTANT
Payer: COMMERCIAL

## 2023-04-10 DIAGNOSIS — R41.3 MEMORY LOSS: ICD-10-CM

## 2023-04-10 PROCEDURE — 70553 MRI BRAIN STEM W/O & W/DYE: CPT

## 2023-04-10 PROCEDURE — A9585 GADOBUTROL INJECTION: HCPCS | Performed by: RADIOLOGY

## 2023-04-10 PROCEDURE — 255N000002 HC RX 255 OP 636: Performed by: RADIOLOGY

## 2023-04-10 RX ORDER — GADOBUTROL 604.72 MG/ML
7.5 INJECTION INTRAVENOUS ONCE
Status: COMPLETED | OUTPATIENT
Start: 2023-04-10 | End: 2023-04-10

## 2023-04-10 RX ADMIN — GADOBUTROL 7.5 ML: 604.72 INJECTION INTRAVENOUS at 07:50

## 2023-04-20 ENCOUNTER — LAB (OUTPATIENT)
Dept: LAB | Facility: OTHER | Age: 68
End: 2023-04-20
Payer: COMMERCIAL

## 2023-04-20 DIAGNOSIS — E78.5 HYPERLIPIDEMIA LDL GOAL <100: Primary | ICD-10-CM

## 2023-04-20 DIAGNOSIS — E78.5 HYPERLIPIDEMIA LDL GOAL <100: ICD-10-CM

## 2023-04-20 LAB
CHOLEST SERPL-MCNC: 322 MG/DL
HDLC SERPL-MCNC: 69 MG/DL
LDLC SERPL CALC-MCNC: 224 MG/DL
NONHDLC SERPL-MCNC: 253 MG/DL
TRIGL SERPL-MCNC: 146 MG/DL

## 2023-04-20 PROCEDURE — 80061 LIPID PANEL: CPT | Mod: ZL

## 2023-04-20 PROCEDURE — 36415 COLL VENOUS BLD VENIPUNCTURE: CPT | Mod: ZL

## 2023-04-20 RX ORDER — ROSUVASTATIN CALCIUM 10 MG/1
10 TABLET, COATED ORAL DAILY
Qty: 90 TABLET | Refills: 1 | Status: SHIPPED | OUTPATIENT
Start: 2023-04-20 | End: 2024-05-03

## 2023-05-26 ENCOUNTER — OFFICE VISIT (OUTPATIENT)
Dept: FAMILY MEDICINE | Facility: OTHER | Age: 68
End: 2023-05-26
Attending: PHYSICIAN ASSISTANT
Payer: COMMERCIAL

## 2023-05-26 VITALS
RESPIRATION RATE: 16 BRPM | TEMPERATURE: 98.1 F | BODY MASS INDEX: 25.86 KG/M2 | DIASTOLIC BLOOD PRESSURE: 80 MMHG | HEART RATE: 78 BPM | OXYGEN SATURATION: 97 % | SYSTOLIC BLOOD PRESSURE: 120 MMHG | WEIGHT: 146 LBS

## 2023-05-26 DIAGNOSIS — E53.8 VITAMIN B12 DEFICIENCY DISEASE: ICD-10-CM

## 2023-05-26 DIAGNOSIS — E53.8 VITAMIN B12 DEFICIENCY (NON ANEMIC): ICD-10-CM

## 2023-05-26 DIAGNOSIS — E78.2 MIXED HYPERLIPIDEMIA: ICD-10-CM

## 2023-05-26 DIAGNOSIS — E55.9 VITAMIN D DEFICIENCY DISEASE: ICD-10-CM

## 2023-05-26 DIAGNOSIS — R41.840 ATTENTION AND CONCENTRATION DEFICIT: Primary | ICD-10-CM

## 2023-05-26 DIAGNOSIS — M81.0 AGE-RELATED OSTEOPOROSIS WITHOUT CURRENT PATHOLOGICAL FRACTURE: ICD-10-CM

## 2023-05-26 PROCEDURE — 96372 THER/PROPH/DIAG INJ SC/IM: CPT | Performed by: PHYSICIAN ASSISTANT

## 2023-05-26 PROCEDURE — G0463 HOSPITAL OUTPT CLINIC VISIT: HCPCS | Mod: 25

## 2023-05-26 PROCEDURE — 99214 OFFICE O/P EST MOD 30 MIN: CPT | Performed by: PHYSICIAN ASSISTANT

## 2023-05-26 PROCEDURE — G0463 HOSPITAL OUTPT CLINIC VISIT: HCPCS

## 2023-05-26 PROCEDURE — 250N000011 HC RX IP 250 OP 636: Performed by: PHYSICIAN ASSISTANT

## 2023-05-26 RX ORDER — DIPHENHYDRAMINE HYDROCHLORIDE 50 MG/ML
50 INJECTION INTRAMUSCULAR; INTRAVENOUS
Status: CANCELLED
Start: 2023-05-26

## 2023-05-26 RX ORDER — CYANOCOBALAMIN 1000 UG/ML
1000 INJECTION, SOLUTION INTRAMUSCULAR; SUBCUTANEOUS
Status: DISCONTINUED | OUTPATIENT
Start: 2023-05-26 | End: 2023-07-20

## 2023-05-26 RX ORDER — CYANOCOBALAMIN 1000 UG/ML
1000 INJECTION, SOLUTION INTRAMUSCULAR; SUBCUTANEOUS ONCE
Status: CANCELLED
Start: 2023-05-26 | End: 2023-05-26

## 2023-05-26 RX ORDER — METHYLPREDNISOLONE SODIUM SUCCINATE 125 MG/2ML
125 INJECTION, POWDER, LYOPHILIZED, FOR SOLUTION INTRAMUSCULAR; INTRAVENOUS
Status: CANCELLED
Start: 2023-05-26

## 2023-05-26 RX ORDER — ALBUTEROL SULFATE 0.83 MG/ML
2.5 SOLUTION RESPIRATORY (INHALATION)
Status: CANCELLED | OUTPATIENT
Start: 2023-05-26

## 2023-05-26 RX ORDER — EPINEPHRINE 1 MG/ML
0.3 INJECTION, SOLUTION INTRAMUSCULAR; SUBCUTANEOUS EVERY 5 MIN PRN
Status: CANCELLED | OUTPATIENT
Start: 2023-05-26

## 2023-05-26 RX ORDER — METHYLPHENIDATE HYDROCHLORIDE 10 MG/1
10 TABLET ORAL 2 TIMES DAILY
Qty: 60 TABLET | Refills: 0 | Status: SHIPPED | OUTPATIENT
Start: 2023-05-26 | End: 2023-08-24

## 2023-05-26 RX ORDER — MEPERIDINE HYDROCHLORIDE 25 MG/ML
25 INJECTION INTRAMUSCULAR; INTRAVENOUS; SUBCUTANEOUS EVERY 30 MIN PRN
Status: CANCELLED | OUTPATIENT
Start: 2023-05-26

## 2023-05-26 RX ORDER — ALBUTEROL SULFATE 90 UG/1
1-2 AEROSOL, METERED RESPIRATORY (INHALATION)
Status: CANCELLED
Start: 2023-05-26

## 2023-05-26 RX ADMIN — CYANOCOBALAMIN 1000 MCG: 1000 INJECTION, SOLUTION INTRAMUSCULAR; SUBCUTANEOUS at 11:13

## 2023-05-26 ASSESSMENT — ANXIETY QUESTIONNAIRES
1. FEELING NERVOUS, ANXIOUS, OR ON EDGE: NOT AT ALL
GAD7 TOTAL SCORE: 0
3. WORRYING TOO MUCH ABOUT DIFFERENT THINGS: NOT AT ALL
5. BEING SO RESTLESS THAT IT IS HARD TO SIT STILL: NOT AT ALL
7. FEELING AFRAID AS IF SOMETHING AWFUL MIGHT HAPPEN: NOT AT ALL
6. BECOMING EASILY ANNOYED OR IRRITABLE: NOT AT ALL
GAD7 TOTAL SCORE: 0
2. NOT BEING ABLE TO STOP OR CONTROL WORRYING: NOT AT ALL

## 2023-05-26 ASSESSMENT — PATIENT HEALTH QUESTIONNAIRE - PHQ9
SUM OF ALL RESPONSES TO PHQ QUESTIONS 1-9: 0
5. POOR APPETITE OR OVEREATING: NOT AT ALL

## 2023-05-26 ASSESSMENT — PAIN SCALES - GENERAL: PAINLEVEL: NO PAIN (0)

## 2023-05-26 NOTE — PROGRESS NOTES
Assessment & Plan     Attention and concentration deficit  Really a struggle to focus and her memory is very poor.  Hears only part of peoples answers can't remember if she has talked about something .   Won't be seeing Neurology for a while yet.  Try low dose ritalin for focus an attention.  Discussion on working her brain and activities to join to help her be social.    - methylphenidate (RITALIN) 10 MG tablet; Take 1 tablet (10 mg) by mouth 2 times daily    Mixed hyperlipidemia  She is goint to recheck in 2 weeks , some myalgia.   - Lipid Profile (Chol, Trig, HDL, LDL calc); Future  - CK total; Future  - AST; Future  - ALT; Future    Review of external notes as documented elsewhere in note  Ordering of each unique test  Prescription drug management  10  minutes spent by me on the date of the encounter doing chart review, history and exam, documentation and further activities per the note       See Patient Instructions    No follow-ups on file.    MARCOS John  [facility] - BRENT Friedman is a 67 year old, presenting for the following health issues:  Follow Up        5/26/2023     9:37 AM   Additional Questions   Roomed by Martin Kamara LPN   Accompanied by self         5/26/2023     9:37 AM   Patient Reported Additional Medications   Patient reports taking the following new medications none     HPI     Hyperlipidemia Follow-Up      Are you regularly taking any medication or supplement to lower your cholesterol?   Yes- rosuvastatin    Are you having muscle aches or other side effects that you think could be caused by your cholesterol lowering medication?  Yes- muscle aches in arms    Depression Followup    How are you doing with your depression since your last visit? No change    Are you having other symptoms that might be associated with depression? No    Have you had a significant life event?  No     Are you feeling anxious or having panic attacks?   No    Do you have any  concerns with your use of alcohol or other drugs? No    Social History     Tobacco Use     Smoking status: Former     Packs/day: 0.30     Years: 1.00     Pack years: 0.30     Types: Cigarettes     Smokeless tobacco: Never     Tobacco comments:     quit 1989; no passive exposure   Vaping Use     Vaping status: Never Used   Substance Use Topics     Alcohol use: Yes     Comment: 1 drink beer and wine daily     Drug use: No         6/12/2022     8:21 PM 3/22/2023     2:11 PM 5/26/2023     9:00 AM   PHQ   PHQ-9 Total Score 4 4 0   Q9: Thoughts of better off dead/self-harm past 2 weeks Not at all Not at all Not at all         1/17/2022     3:49 PM 3/22/2023     2:12 PM 5/26/2023     9:00 AM   BRADLY-7 SCORE   Total Score  0 (minimal anxiety)    Total Score 0 0 0         5/26/2023     9:00 AM   Last PHQ-9   1.  Little interest or pleasure in doing things 0   2.  Feeling down, depressed, or hopeless 0   3.  Trouble falling or staying asleep, or sleeping too much 0   4.  Feeling tired or having little energy 0   5.  Poor appetite or overeating 0   6.  Feeling bad about yourself 0   7.  Trouble concentrating 0   8.  Moving slowly or restless 0   Q9: Thoughts of better off dead/self-harm past 2 weeks 0   PHQ-9 Total Score 0         5/26/2023     9:00 AM   BRADLY-7    1. Feeling nervous, anxious, or on edge 0   2. Not being able to stop or control worrying 0   3. Worrying too much about different things 0   4. Trouble relaxing 0   5. Being so restless that it is hard to sit still 0   6. Becoming easily annoyed or irritable 0   7. Feeling afraid, as if something awful might happen 0   BRADLY-7 Total Score 0       Suicide Assessment Five-step Evaluation and Treatment (SAFE-T)    Hypothyroidism Follow-up      Since last visit, patient describes the following symptoms: fatigue            Review of Systems   Constitutional, HEENT, cardiovascular, pulmonary, gi and gu systems are negative, except as otherwise noted.      Objective    BP  120/80 (BP Location: Right arm, Patient Position: Sitting, Cuff Size: Adult Regular)   Pulse 78   Temp 98.1  F (36.7  C) (Tympanic)   Resp 16   Wt 66.2 kg (146 lb)   SpO2 97%   BMI 25.86 kg/m    Body mass index is 25.86 kg/m .  Physical Exam   GENERAL: healthy, alert and no distress  EYES: Eyes grossly normal to inspection, PERRL and conjunctivae and sclerae normal  HENT: ear canals and TM's normal, nose and mouth without ulcers or lesions  NECK: no adenopathy, no asymmetry, masses, or scars and thyroid normal to palpation  RESP: lungs clear to auscultation - no rales, rhonchi or wheezes  CV: regular rate and rhythm, normal S1 S2, no S3 or S4, no murmur, click or rub, no peripheral edema and peripheral pulses strong  ABDOMEN: soft, nontender, no hepatosplenomegaly, no masses and bowel sounds normal  MS: no gross musculoskeletal defects noted, no edema  SKIN: no suspicious lesions or rashes  NEURO: Normal strength and tone, mentation intact and speech normal  PSYCH: mentation appears normal, affect normal/bright  LYMPH: no cervical, supraclavicular, axillary, or inguinal adenopathy    Lab on 04/20/2023   Component Date Value Ref Range Status     Cholesterol 04/20/2023 322 (H)  <200 mg/dL Final     Triglycerides 04/20/2023 146  <150 mg/dL Final     Direct Measure HDL 04/20/2023 69  >=50 mg/dL Final     LDL Cholesterol Calculated 04/20/2023 224 (H)  <=100 mg/dL Final     Non HDL Cholesterol 04/20/2023 253 (H)  <130 mg/dL Final

## 2023-06-10 DIAGNOSIS — F33.9 EPISODE OF RECURRENT MAJOR DEPRESSIVE DISORDER, UNSPECIFIED DEPRESSION EPISODE SEVERITY (H): ICD-10-CM

## 2023-06-12 RX ORDER — VENLAFAXINE HYDROCHLORIDE 75 MG/1
CAPSULE, EXTENDED RELEASE ORAL
Qty: 90 CAPSULE | Refills: 0 | Status: SHIPPED | OUTPATIENT
Start: 2023-06-12 | End: 2023-10-09

## 2023-06-12 NOTE — TELEPHONE ENCOUNTER
effexor       Last Written Prescription Date:  3-21-23  Last Fill Quantity: 90,   # refills: 0  Last Office Visit: 5-26-23  Future Office visit:    Next 5 appointments (look out 90 days)    Jun 26, 2023  8:30 AM  Nurse Only with HC FP NURSE  [facility] [place] ([facility] [place] ) [address]  [place] [address]  [phone]   Jul 26, 2023  8:30 AM  Nurse Only with HC FP NURSE  [facility] [place] ([facility] [place] ) [address]  [place] [address]  [phone]   Aug 28, 2023  8:30 AM  Nurse Only with HC FP NURSE  [facility] [place] ([facility] [place] ) [address]  [place] [address]  [phone]           Routing refill request to provider for review/approval because:

## 2023-06-13 DIAGNOSIS — E03.9 ACQUIRED HYPOTHYROIDISM: ICD-10-CM

## 2023-06-15 RX ORDER — LEVOTHYROXINE SODIUM 88 UG/1
TABLET ORAL
Qty: 90 TABLET | Refills: 2 | Status: SHIPPED | OUTPATIENT
Start: 2023-06-15 | End: 2024-03-28

## 2023-06-23 ENCOUNTER — TRANSCRIBE ORDERS (OUTPATIENT)
Dept: OTHER | Age: 68
End: 2023-06-23

## 2023-06-23 DIAGNOSIS — F09 COGNITIVE DYSFUNCTION: Primary | ICD-10-CM

## 2023-06-23 DIAGNOSIS — Z82.0 FAMILY HISTORY OF ALZHEIMER'S DISEASE: ICD-10-CM

## 2023-06-26 ENCOUNTER — ALLIED HEALTH/NURSE VISIT (OUTPATIENT)
Dept: FAMILY MEDICINE | Facility: OTHER | Age: 68
End: 2023-06-26
Attending: PHYSICIAN ASSISTANT
Payer: COMMERCIAL

## 2023-06-26 ENCOUNTER — TELEPHONE (OUTPATIENT)
Dept: FAMILY MEDICINE | Facility: OTHER | Age: 68
End: 2023-06-26

## 2023-06-26 DIAGNOSIS — E53.8 VITAMIN B12 DEFICIENCY (NON ANEMIC): Primary | ICD-10-CM

## 2023-06-26 DIAGNOSIS — E53.8 VITAMIN B12 DEFICIENCY DISEASE: ICD-10-CM

## 2023-06-26 DIAGNOSIS — E53.8 VITAMIN B12 DEFICIENCY (NON ANEMIC): ICD-10-CM

## 2023-06-26 PROCEDURE — 250N000011 HC RX IP 250 OP 636: Performed by: PHYSICIAN ASSISTANT

## 2023-06-26 PROCEDURE — 96372 THER/PROPH/DIAG INJ SC/IM: CPT | Performed by: PHYSICIAN ASSISTANT

## 2023-06-26 RX ORDER — CYANOCOBALAMIN 1000 UG/ML
1000 INJECTION, SOLUTION INTRAMUSCULAR; SUBCUTANEOUS
Status: DISPENSED | OUTPATIENT
Start: 2023-06-26 | End: 2024-06-20

## 2023-06-26 RX ADMIN — CYANOCOBALAMIN 1000 MCG: 1000 INJECTION, SOLUTION INTRAMUSCULAR; SUBCUTANEOUS at 08:50

## 2023-06-30 ENCOUNTER — LAB (OUTPATIENT)
Dept: LAB | Facility: OTHER | Age: 68
End: 2023-06-30
Payer: COMMERCIAL

## 2023-06-30 DIAGNOSIS — E78.2 MIXED HYPERLIPIDEMIA: ICD-10-CM

## 2023-06-30 LAB
ALT SERPL W P-5'-P-CCNC: 20 U/L (ref 0–50)
AST SERPL W P-5'-P-CCNC: 23 U/L (ref 0–45)
CHOLEST SERPL-MCNC: 198 MG/DL
CK SERPL-CCNC: 104 U/L (ref 26–192)
HDLC SERPL-MCNC: 65 MG/DL
LDLC SERPL CALC-MCNC: 112 MG/DL
NONHDLC SERPL-MCNC: 133 MG/DL
TRIGL SERPL-MCNC: 107 MG/DL

## 2023-06-30 PROCEDURE — 84450 TRANSFERASE (AST) (SGOT): CPT | Mod: ZL

## 2023-06-30 PROCEDURE — 82550 ASSAY OF CK (CPK): CPT | Mod: ZL

## 2023-06-30 PROCEDURE — 80061 LIPID PANEL: CPT | Mod: ZL

## 2023-06-30 PROCEDURE — 36415 COLL VENOUS BLD VENIPUNCTURE: CPT | Mod: ZL

## 2023-06-30 PROCEDURE — 84460 ALANINE AMINO (ALT) (SGPT): CPT | Mod: ZL

## 2023-07-17 NOTE — RESULT ENCOUNTER NOTE
Please notify this patient.  Has been sitting in the inbox..  Goal LDL is under 100 but certainly improved.

## 2023-07-20 ENCOUNTER — HOSPITAL ENCOUNTER (EMERGENCY)
Facility: HOSPITAL | Age: 68
Discharge: HOME | End: 2023-07-20
Attending: NURSE PRACTITIONER | Admitting: NURSE PRACTITIONER
Payer: COMMERCIAL

## 2023-07-20 VITALS
TEMPERATURE: 99.2 F | HEART RATE: 82 BPM | OXYGEN SATURATION: 96 % | RESPIRATION RATE: 18 BRPM | DIASTOLIC BLOOD PRESSURE: 82 MMHG | SYSTOLIC BLOOD PRESSURE: 121 MMHG

## 2023-07-20 DIAGNOSIS — H92.01 OTALGIA, RIGHT: Primary | ICD-10-CM

## 2023-07-20 DIAGNOSIS — R59.0 CERVICAL LYMPHADENOPATHY: ICD-10-CM

## 2023-07-20 DIAGNOSIS — H69.91 EUSTACHIAN TUBE DYSFUNCTION, RIGHT: ICD-10-CM

## 2023-07-20 PROCEDURE — G0463 HOSPITAL OUTPT CLINIC VISIT: HCPCS

## 2023-07-20 PROCEDURE — 99213 OFFICE O/P EST LOW 20 MIN: CPT | Performed by: NURSE PRACTITIONER

## 2023-07-20 RX ORDER — FLUTICASONE PROPIONATE 50 MCG
1 SPRAY, SUSPENSION (ML) NASAL DAILY
Qty: 18.2 ML | Refills: 0 | Status: SHIPPED | OUTPATIENT
Start: 2023-07-20 | End: 2024-05-03

## 2023-07-20 ASSESSMENT — ENCOUNTER SYMPTOMS
FEVER: 0
CHILLS: 0

## 2023-07-20 ASSESSMENT — ACTIVITIES OF DAILY LIVING (ADL): ADLS_ACUITY_SCORE: 35

## 2023-07-20 NOTE — ED TRIAGE NOTES
Ear fullness in R ear for a week, glands swollen for about 3 days.   Therapies tried ear wash at providers office (right side) on 6/26 did help.  Denies fevers, gi sx, gu sx, facial pain or pressure, cough.  Jessa Behrman, LPN

## 2023-07-20 NOTE — ED PROVIDER NOTES
History     Chief Complaint   Patient presents with     Ear Fullness     Lymphadenopathy     Swollen gland right lateral neck area     HPI  Idalia Hoang is a 67 year old female who presents to urgent care for evaluation of right ear pain and swollen neck lymph nodes.  No ear drainage.  Reports some decreased hearing to the right ear.  No fevers or chills.  She did have some dizziness yesterday but none currently.  She tells me that 3 weeks ago she had her right ear irrigated in the clinic and a huge chunk of earwax was pulled out.  She had similar symptoms at that time and notes that her symptoms improved after the ear irrigation.  She has been taking Sudafed over the last week with minimal improvements to her current symptoms.  Reports that she always has some postnasal drainage.  Denies any significant sinus pressure/pain.  No history of surgeries to this ear.     Allergies:  No Known Allergies    Problem List:    Patient Active Problem List    Diagnosis Date Noted     Vitamin B12 deficiency disease 05/26/2023     Priority: Medium     Vitamin B12 deficiency (non anemic) 05/26/2023     Priority: Medium     Episode of recurrent major depressive disorder, unspecified depression episode severity (H) 03/22/2023     Priority: Medium     Family history of melanoma 02/25/2017     Priority: Medium     Acquired hypothyroidism 04/25/2016     Priority: Medium     Estradiol deficiency 04/25/2016     Priority: Medium     Mixed hyperlipidemia 04/25/2016     Priority: Medium     ACP (advance care planning) 04/25/2016     Priority: Medium     Advance Care Planning 4/25/2016: ACP Review of Chart / Resources Provided:  Reviewed chart for advance care plan.  Idalia Hoang has been provided information and resources to begin or update their advance care plan.  Added by Sarita Jeff             Lichen simplex chronicus 05/10/2015     Priority: Medium     Advanced care planning/counseling discussion 03/12/2012     Priority:  Medium        Past Medical History:    Past Medical History:   Diagnosis Date     Acquired hypothyroidism 4/25/2016     Chronic serous otitis media, simple or unspecified 06/18/2012     Depression, recurrent 01/01/2011     Dysfunction of eustachian tube 06/18/2012     Estradiol deficiency 4/25/2016     Hypothyroidism 01/01/2011     Mixed hyperlipidemia 4/25/2016     Osteopenia        Past Surgical History:    Past Surgical History:   Procedure Laterality Date     COLONOSCOPY  04/20/2012     COLONOSCOPY N/A 11/17/2022    Procedure: COLONOSCOPY;  Surgeon: Eduardo Subramanian MD;  Location: [facility] OR     PHACOEMULSIFICATION WITH STANDARD INTRAOCULAR LENS IMPLANT Left 4/12/2022    Procedure: PHACOEMULSIFICATION CATARACT EXTRACTION POSTERIOR CHAMBER LENS LEFT EYE;  Surgeon: Tremayne Crawford MD;  Location: [facility] OR     surgical removal  2010    tendon nodule; free of disease       Family History:    Family History   Problem Relation Age of Onset     Hypertension Sister      Melanoma Sister      C.A.D. Other         grandmother     Cancer Other         lung; grandmother/leukemia; grandfather     Depression Other         family h/o     Hypertension Other         grandmother     Breast Cancer Sister 62     Other - See Comments Mother         memory loss/sjogrensyndrome/stomach     Osteoporosis Mother      Alzheimer Disease Mother      Hyperlipidemia Sister      Diabetes Father      Coronary Artery Disease Father         a fib     Thyroid Disease Sister      Asthma Brother        Social History:  Marital Status:  Single [1]  Social History     Tobacco Use     Smoking status: Former     Packs/day: 0.30     Years: 1.00     Pack years: 0.30     Types: Cigarettes     Smokeless tobacco: Never     Tobacco comments:     quit 1989; no passive exposure   Vaping Use     Vaping Use: Never used   Substance Use Topics     Alcohol use: Yes     Comment: 1 drink beer and wine daily     Drug use: No        Medications:    fluticasone (FLONASE) 50  MCG/ACT nasal spray  levothyroxine (SYNTHROID/LEVOTHROID) 88 MCG tablet  methylphenidate (RITALIN) 10 MG tablet  rosuvastatin (CRESTOR) 10 MG tablet  venlafaxine (EFFEXOR XR) 75 MG 24 hr capsule  vitamin D3 (CHOLECALCIFEROL) 1.25 MG (51228 UT) capsule          Review of Systems   Constitutional: Negative for chills and fever.   HENT: Positive for ear pain and postnasal drip. Negative for ear discharge.    All other systems reviewed and are negative.      Physical Exam   BP: 121/82  Pulse: 82  Temp: 99.2  F (37.3  C)  Resp: 18  SpO2: 96 %      Physical Exam  Vitals and nursing note reviewed.   Constitutional:       Appearance: Normal appearance. She is not ill-appearing or toxic-appearing.   HENT:      Head: Atraumatic.      Right Ear: Tympanic membrane and ear canal normal. There is no impacted cerumen.      Left Ear: Tympanic membrane and ear canal normal. There is no impacted cerumen.      Ears:      Comments: No earwax appreciated to the right ear canal.  No swelling, redness or tenderness to the ear canal.  Right TM is translucent with full view of all bony landmarks.     Mouth/Throat:      Mouth: Mucous membranes are moist.   Eyes:      Pupils: Pupils are equal, round, and reactive to light.   Cardiovascular:      Rate and Rhythm: Normal rate.   Pulmonary:      Effort: Pulmonary effort is normal.   Musculoskeletal:      Cervical back: Neck supple.   Lymphadenopathy:      Cervical: Cervical adenopathy (Superficial right-sided) present.   Skin:     General: Skin is warm and dry.   Neurological:      Mental Status: She is alert and oriented to person, place, and time.         ED Course                 Procedures            No results found for this or any previous visit (from the past 24 hour(s)).    Medications - No data to display    Assessments & Plan (with Medical Decision Making)     I have reviewed the nursing notes.    67-year-old female that presented for evaluation of right ear pain/fullness with symptoms  having started a week ago.  3 weeks ago she had her ear irrigated and earwax was removed from this ear.  On today's evaluation there is no earwax that was appreciated to her ear canal.  No signs of an external or middle ear infection were appreciated.  I discussed these findings with patient.  Patient notes that she has been taking Sudafed with minimal effectiveness.  Reports some decreased to her hearing.  Her symptoms do appear consistent with eustachian tube dysfunction.  Discussed treatment for this including trialing a nasal spray with either an antihistamine or continuing decongestant that she has been doing.  Patient does not feel like what she has been doing has been working.  I discussed referral to ENT with patient and she noted that she had called the clinic and was told that she cannot get in until December 2023.  After much discussion patient was okay with getting a referral to ENT and was also advised that she may try [facility]. Patient may return to urgent care or emergency department for any worsening or concerning symptoms.  Patient verbalized understanding and was in agreement with plan of care.    I have reviewed the findings, diagnosis, plan and need for follow up with the patient.  This document was prepared using a combination of typing and voice generated software.  While every attempt was made for accuracy, spelling and grammatical errors may exist.        Discharge Medication List as of 7/20/2023  1:35 PM      START taking these medications    Details   fluticasone (FLONASE) 50 MCG/ACT nasal spray Spray 1 spray into both nostrils daily, Disp-18.2 mL, R-0, E-Prescribe             Final diagnoses:   Otalgia, right   Eustachian tube dysfunction, right   Cervical lymphadenopathy       7/20/2023   [facility]     Mpofu, Prudence, CNP  07/20/23 1354       Mpofu, Prudence, CNP  07/20/23 1355       Mpofu, Prudence, CNP  07/20/23 1354

## 2023-07-20 NOTE — DISCHARGE INSTRUCTIONS
There are no signs of an ear infection to your right ear today.  There is no earwax that was found in your ear canal either.  Continue taking Sudafed.  Start using the nasal spray as prescribed.    I placed a referral to [facility] and they will call to schedule an appointment with you.  You can also try and call [facility] and see if you may be able to get an earlier appointment if [facility] is a long wait.    Follow-up with your doctor as needed.  Return to urgent care or emergency department for any worsening or concerning symptoms.

## 2023-07-20 NOTE — ED TRIAGE NOTES
"\"Having right ear fullness for about 1 week.  Also feeling like I have a swollen gland in the right neck area.\"       "

## 2023-07-26 ENCOUNTER — ALLIED HEALTH/NURSE VISIT (OUTPATIENT)
Dept: FAMILY MEDICINE | Facility: OTHER | Age: 68
End: 2023-07-26
Attending: PHYSICIAN ASSISTANT
Payer: COMMERCIAL

## 2023-07-26 DIAGNOSIS — E53.8 VITAMIN B12 DEFICIENCY DISEASE: ICD-10-CM

## 2023-07-26 DIAGNOSIS — E53.8 VITAMIN B12 DEFICIENCY (NON ANEMIC): ICD-10-CM

## 2023-07-26 PROCEDURE — 96372 THER/PROPH/DIAG INJ SC/IM: CPT | Performed by: PHYSICIAN ASSISTANT

## 2023-07-26 PROCEDURE — 250N000011 HC RX IP 250 OP 636: Performed by: PHYSICIAN ASSISTANT

## 2023-07-26 RX ADMIN — CYANOCOBALAMIN 1000 MCG: 1000 INJECTION, SOLUTION INTRAMUSCULAR; SUBCUTANEOUS at 08:49

## 2023-08-01 ENCOUNTER — OFFICE VISIT (OUTPATIENT)
Dept: OTOLARYNGOLOGY | Facility: OTHER | Age: 68
End: 2023-08-01
Attending: NURSE PRACTITIONER
Payer: COMMERCIAL

## 2023-08-01 VITALS
TEMPERATURE: 97 F | HEIGHT: 64 IN | DIASTOLIC BLOOD PRESSURE: 62 MMHG | HEART RATE: 90 BPM | SYSTOLIC BLOOD PRESSURE: 116 MMHG | WEIGHT: 146 LBS | OXYGEN SATURATION: 95 % | BODY MASS INDEX: 24.92 KG/M2 | RESPIRATION RATE: 14 BRPM

## 2023-08-01 DIAGNOSIS — H65.191 ACUTE EFFUSION OF RIGHT EAR: Primary | ICD-10-CM

## 2023-08-01 DIAGNOSIS — H92.01 OTALGIA, RIGHT: ICD-10-CM

## 2023-08-01 PROCEDURE — 31231 NASAL ENDOSCOPY DX: CPT | Performed by: NURSE PRACTITIONER

## 2023-08-01 PROCEDURE — 99213 OFFICE O/P EST LOW 20 MIN: CPT | Mod: 25 | Performed by: NURSE PRACTITIONER

## 2023-08-01 PROCEDURE — 31231 NASAL ENDOSCOPY DX: CPT | Mod: 52 | Performed by: NURSE PRACTITIONER

## 2023-08-01 PROCEDURE — 92504 EAR MICROSCOPY EXAMINATION: CPT | Performed by: NURSE PRACTITIONER

## 2023-08-01 PROCEDURE — G0463 HOSPITAL OUTPT CLINIC VISIT: HCPCS | Mod: 25 | Performed by: NURSE PRACTITIONER

## 2023-08-01 RX ORDER — CETIRIZINE HYDROCHLORIDE 10 MG/1
10 TABLET ORAL DAILY
Qty: 30 TABLET | Refills: 1 | Status: SHIPPED | OUTPATIENT
Start: 2023-08-01 | End: 2023-12-01

## 2023-08-01 RX ORDER — PSEUDOEPHEDRINE HCL 30 MG
30 TABLET ORAL EVERY 6 HOURS PRN
COMMUNITY
End: 2024-05-03

## 2023-08-01 ASSESSMENT — PAIN SCALES - GENERAL: PAINLEVEL: MODERATE PAIN (5)

## 2023-08-01 NOTE — PATIENT INSTRUCTIONS
Thank you for allowing Nayely Jiang and our ENT team to participate in your care.  If your medications are too expensive, please give the nurse a call.  We can possibly change this medication.  If you have a scheduling or an appointment question please contact our Health Unit Coordinator at their direct line [phone].   ALL nursing questions or concerns can be directed to your ENT nurse at: [phone] - Qra     Start Flonase take as directed     Start Russell Med Nasal Saline  Use high volume russell med saline irrigation.  Use warm distilled water and 2 packets of the salt solution that comes with the bottle, dissolve in bottle up to 240 ml linda.  Irrigate each side of your nose leaning over the sink, using 1/3 to 1/2 the volume of the bottle in each nostril every irrigation.  Irrigate 5 times daily and as needed.    Start cetirizine (ZYRTEC) 10 MG tablet, Take 1 tablet (10 mg) by mouth daily      Ordered an audiogram you can schedule this today.    Follow up with Dr. Hyde or Yana Tran for a Myringotomy

## 2023-08-01 NOTE — PROGRESS NOTES
Otolaryngology Note         Chief Complaint:     Patient presents with:  Otalgia: Right ear           History of Present Illness:     Idalia Hoang is a 67 year old female who presents today for ear cleaning.  She reports her right ear has been plugged for the past 3 weeks. No associated cold symptoms.  No nasal congestion.  She was given fluticasone without improvement.  She has been feeling some swelling in her neck glands.  No fevers.      She was seen in the urgent care on 7/20/2023 for concerns for right otalgia.  At that time ear exam showed no concerns for cerumen.  It is documented that the right TM was translucent with a full blue view of all the bony landmarks.  Is documented that symptoms were consistent with eustachian tube dysfunction.  Recommended treatment with nasal spray and antihistamine or decongestant.  She was referred to ENT.    This has never happened before.    The hearing in the right ear is affected.    No concerns for tinnitus.    No vertigo, feels a little disequilibrium,  She has been feeling fatigued.    No otorrhea.    No hx of COM or otologic surgeries.   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  No real noise exposure  No family history of hearing loss.    No flux hearing.           Medications:     Current Outpatient Rx   Medication Sig Dispense Refill    cetirizine (ZYRTEC) 10 MG tablet Take 1 tablet (10 mg) by mouth daily 30 tablet 1    fluticasone (FLONASE) 50 MCG/ACT nasal spray Spray 1 spray into both nostrils daily 18.2 mL 0    levothyroxine (SYNTHROID/LEVOTHROID) 88 MCG tablet Take 1 tablet by mouth once daily 90 tablet 2    pseudoePHEDrine (SUDAFED) 30 MG tablet Take 30 mg by mouth every 6 hours as needed for congestion      venlafaxine (EFFEXOR XR) 75 MG 24 hr capsule Take 1 capsule by mouth once daily 90 capsule 0    vitamin D3 (CHOLECALCIFEROL) 1.25 MG (69600 UT) capsule Take 1 capsule (50,000 Units) by mouth every 7 days 12 capsule 3     "methylphenidate (RITALIN) 10 MG tablet Take 1 tablet (10 mg) by mouth 2 times daily (Patient not taking: Reported on 8/1/2023) 60 tablet 0    rosuvastatin (CRESTOR) 10 MG tablet Take 1 tablet (10 mg) by mouth daily (Patient not taking: Reported on 8/1/2023) 90 tablet 1            Allergies:     Allergies: Patient has no known allergies.          Past Medical History:     Past Medical History:   Diagnosis Date    Acquired hypothyroidism 4/25/2016    Chronic serous otitis media, simple or unspecified 06/18/2012    Depression, recurrent 01/01/2011    Dysfunction of eustachian tube 06/18/2012    Estradiol deficiency 4/25/2016    Hypothyroidism 01/01/2011    Mixed hyperlipidemia 4/25/2016    Osteopenia             Past Surgical History:     Past Surgical History:   Procedure Laterality Date    COLONOSCOPY  04/20/2012    COLONOSCOPY N/A 11/17/2022    Procedure: COLONOSCOPY;  Surgeon: Eduardo Subramanian MD;  Location: [facility] OR    PHACOEMULSIFICATION WITH STANDARD INTRAOCULAR LENS IMPLANT Left 4/12/2022    Procedure: PHACOEMULSIFICATION CATARACT EXTRACTION POSTERIOR CHAMBER LENS LEFT EYE;  Surgeon: Tremayne Crawford MD;  Location: [facility] OR    surgical removal  2010    tendon nodule; free of disease       ENT family history reviewed         Social History:     Social History     Tobacco Use    Smoking status: Former     Packs/day: 0.30     Years: 1.00     Pack years: 0.30     Types: Cigarettes    Smokeless tobacco: Never    Tobacco comments:     quit 1989; no passive exposure   Vaping Use    Vaping Use: Never used   Substance Use Topics    Alcohol use: Yes     Comment: 1 drink beer and wine daily    Drug use: No            Review of Systems:     ROS: See HPI         Physical Exam:     /62 (BP Location: Right arm, Patient Position: Sitting, Cuff Size: Adult Regular)   Pulse 90   Temp 97  F (36.1  C) (Tympanic)   Resp 14   Ht 1.626 m (5' 4\")   Wt 66.2 kg (146 lb)   SpO2 95%   BMI 25.06 kg/m      General - The patient " is well nourished and well developed, and appears to have good nutritional status.  Alert and oriented to person and place, answers questions and cooperates with examination appropriately.   Head and Face - Normocephalic and atraumatic, with no gross asymmetry noted.  The facial nerve is intact, with strong symmetric movements.  Voice and Breathing - The patient was breathing comfortably without the use of accessory muscles. There was no wheezing, stridor. The patients voice was clear and strong, and had appropriate pitch and quality.  Ears - The ears were examined with binocular microscopy and with otoscope.  External ears normal. Right canal patent.  Left canal patent.   Right tympanic membrane is intact with serous effusion, no retraction or mass. No movement with valsalva.   Left tympanic membrane is intact without effusion, retraction or mass.  Eyes - Extraocular movements intact, sclera were not icteric or injected, conjunctiva were pink and moist.  Mouth - Examination of the oral cavity showed pink, healthy oral mucosa. Dentition in good condition. No lesions or ulcerations noted. The tongue was mobile and midline.   Throat - The walls of the oropharynx were smooth, pink, moist, symmetric, and had no lesions or ulcerations.  The tonsillar pillars and soft palate were symmetric. The uvula was midline on elevation.    Neck - Full range of motion on passive movement.  Palpation of the occipital, submental, submandibular, internal jugular chain, and supraclavicular nodes did not demonstrate any abnormal lymph nodes or masses.  Palpation of the thyroid was soft and smooth, with no nodules or goiter appreciated.  The trachea was mobile and midline.  Nose - External contour is symmetric, no gross deflection or scars.  Nasal mucosa is pink and moist with no abnormal mucus.  The septum and turbinates were evaluated with nasal speculum, no polyps, masses, or purulence noted on examination.    To evaluate the nose and  sinuses, I performed rigid nasal endoscopy.  I sprayed both nares with 2 sprays lidocaine and neosynephrine.     I began with the RIGHT side using a 0 degree rigid nasal endoscope, and then similarly examined the LEFT side     Findings:  septum is grossly midline and intact  Inferior turbinates:  moderately enlarged, pale and boggy  Middle turbinate and middle meatus: examined and unremarkable  The superior meatus is examined and unremarkable  Mucosa is healthy throughout,  no polyps nor polypoid degeneration  The SER is examined and unremarkable.  Nasopharynx clear, ET patent, no edema, cobblestoning noted on the NP wall and clear PND noted.   The patient tolerated the procedure well          Assessment and Plan:       ICD-10-CM    1. Acute effusion of right ear  H65.191 cetirizine (ZYRTEC) 10 MG tablet      2. Otalgia, right  H92.01 Adult ENT  Referral          Start Flonase take as directed     Start Russell Med Nasal Saline  Use high volume russell med saline irrigation.  Use warm distilled water and 2 packets of the salt solution that comes with the bottle, dissolve in bottle up to 240 ml linda.  Irrigate each side of your nose leaning over the sink, using 1/3 to 1/2 the volume of the bottle in each nostril every irrigation.  Irrigate 5 times daily and as needed.    Start cetirizine (ZYRTEC) 10 MG tablet, Take 1 tablet (10 mg) by mouth daily      Ordered an audiogram you can schedule this today.    Follow up with Dr. Hyde or Yana Tran for a Myringotomy     Nayely Jiang NP-C  [facility]

## 2023-08-01 NOTE — Clinical Note
Patient has right ear effusion, needs audio in the next 1-2 weeks and follow up with KF or Yana for myringotomy. Thanks Margret

## 2023-08-01 NOTE — LETTER
8/1/2023         RE: Idalia Hoang  [address] [facility] [address]        Dear Colleague,    Thank you for referring your patient, Idalia Hoang, to the [facility]. Please see a copy of my visit note below.    Otolaryngology Note         Chief Complaint:     Patient presents with:  Otalgia: Right ear           History of Present Illness:     Idalia Hoang is a 67 year old female who presents today for ear cleaning.  She reports her right ear has been plugged for the past 3 weeks. No associated cold symptoms.  No nasal congestion.  She was given fluticasone without improvement.  She has been feeling some swelling in her neck glands.  No fevers.      She was seen in the urgent care on 7/20/2023 for concerns for right otalgia.  At that time ear exam showed no concerns for cerumen.  It is documented that the right TM was translucent with a full blue view of all the bony landmarks.  Is documented that symptoms were consistent with eustachian tube dysfunction.  Recommended treatment with nasal spray and antihistamine or decongestant.  She was referred to ENT.    This has never happened before.    The hearing in the right ear is affected.    No concerns for tinnitus.    No vertigo, feels a little disequilibrium,  She has been feeling fatigued.    No otorrhea.    No hx of COM or otologic surgeries.   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  No real noise exposure  No family history of hearing loss.    No flux hearing.           Medications:     Current Outpatient Rx   Medication Sig Dispense Refill     cetirizine (ZYRTEC) 10 MG tablet Take 1 tablet (10 mg) by mouth daily 30 tablet 1     fluticasone (FLONASE) 50 MCG/ACT nasal spray Spray 1 spray into both nostrils daily 18.2 mL 0     levothyroxine (SYNTHROID/LEVOTHROID) 88 MCG tablet Take 1 tablet by mouth once daily 90 tablet 2     pseudoePHEDrine (SUDAFED) 30 MG tablet Take 30 mg by mouth every 6 hours as  needed for congestion       venlafaxine (EFFEXOR XR) 75 MG 24 hr capsule Take 1 capsule by mouth once daily 90 capsule 0     vitamin D3 (CHOLECALCIFEROL) 1.25 MG (22155 UT) capsule Take 1 capsule (50,000 Units) by mouth every 7 days 12 capsule 3     methylphenidate (RITALIN) 10 MG tablet Take 1 tablet (10 mg) by mouth 2 times daily (Patient not taking: Reported on 8/1/2023) 60 tablet 0     rosuvastatin (CRESTOR) 10 MG tablet Take 1 tablet (10 mg) by mouth daily (Patient not taking: Reported on 8/1/2023) 90 tablet 1            Allergies:     Allergies: Patient has no known allergies.          Past Medical History:     Past Medical History:   Diagnosis Date     Acquired hypothyroidism 4/25/2016     Chronic serous otitis media, simple or unspecified 06/18/2012     Depression, recurrent 01/01/2011     Dysfunction of eustachian tube 06/18/2012     Estradiol deficiency 4/25/2016     Hypothyroidism 01/01/2011     Mixed hyperlipidemia 4/25/2016     Osteopenia             Past Surgical History:     Past Surgical History:   Procedure Laterality Date     COLONOSCOPY  04/20/2012     COLONOSCOPY N/A 11/17/2022    Procedure: COLONOSCOPY;  Surgeon: Eduardo Subramanian MD;  Location: [facility] OR     PHACOEMULSIFICATION WITH STANDARD INTRAOCULAR LENS IMPLANT Left 4/12/2022    Procedure: PHACOEMULSIFICATION CATARACT EXTRACTION POSTERIOR CHAMBER LENS LEFT EYE;  Surgeon: Tremayne Crawford MD;  Location: [facility] OR     surgical removal  2010    tendon nodule; free of disease       ENT family history reviewed         Social History:     Social History     Tobacco Use     Smoking status: Former     Packs/day: 0.30     Years: 1.00     Pack years: 0.30     Types: Cigarettes     Smokeless tobacco: Never     Tobacco comments:     quit 1989; no passive exposure   Vaping Use     Vaping Use: Never used   Substance Use Topics     Alcohol use: Yes     Comment: 1 drink beer and wine daily     Drug use: No            Review of Systems:     ROS: See HPI        "  Physical Exam:     /62 (BP Location: Right arm, Patient Position: Sitting, Cuff Size: Adult Regular)   Pulse 90   Temp 97  F (36.1  C) (Tympanic)   Resp 14   Ht 1.626 m (5' 4\")   Wt 66.2 kg (146 lb)   SpO2 95%   BMI 25.06 kg/m      General - The patient is well nourished and well developed, and appears to have good nutritional status.  Alert and oriented to person and place, answers questions and cooperates with examination appropriately.   Head and Face - Normocephalic and atraumatic, with no gross asymmetry noted.  The facial nerve is intact, with strong symmetric movements.  Voice and Breathing - The patient was breathing comfortably without the use of accessory muscles. There was no wheezing, stridor. The patients voice was clear and strong, and had appropriate pitch and quality.  Ears - The ears were examined with binocular microscopy and with otoscope.  External ears normal. Right canal patent.  Left canal patent.   Right tympanic membrane is intact with serous effusion, no retraction or mass. No movement with valsalva.   Left tympanic membrane is intact without effusion, retraction or mass.  Eyes - Extraocular movements intact, sclera were not icteric or injected, conjunctiva were pink and moist.  Mouth - Examination of the oral cavity showed pink, healthy oral mucosa. Dentition in good condition. No lesions or ulcerations noted. The tongue was mobile and midline.   Throat - The walls of the oropharynx were smooth, pink, moist, symmetric, and had no lesions or ulcerations.  The tonsillar pillars and soft palate were symmetric. The uvula was midline on elevation.    Neck - Full range of motion on passive movement.  Palpation of the occipital, submental, submandibular, internal jugular chain, and supraclavicular nodes did not demonstrate any abnormal lymph nodes or masses.  Palpation of the thyroid was soft and smooth, with no nodules or goiter appreciated.  The trachea was mobile and " midline.  Nose - External contour is symmetric, no gross deflection or scars.  Nasal mucosa is pink and moist with no abnormal mucus.  The septum and turbinates were evaluated with nasal speculum, no polyps, masses, or purulence noted on examination.    To evaluate the nose and sinuses, I performed rigid nasal endoscopy.  I sprayed both nares with 2 sprays lidocaine and neosynephrine.     I began with the RIGHT side using a 0 degree rigid nasal endoscope, and then similarly examined the LEFT side     Findings:  septum is grossly midline and intact  Inferior turbinates:  moderately enlarged, pale and boggy  Middle turbinate and middle meatus: examined and unremarkable  The superior meatus is examined and unremarkable  Mucosa is healthy throughout,  no polyps nor polypoid degeneration  The SER is examined and unremarkable.  Nasopharynx clear, ET patent, no edema, cobblestoning noted on the NP wall and clear PND noted.   The patient tolerated the procedure well          Assessment and Plan:       ICD-10-CM    1. Acute effusion of right ear  H65.191 cetirizine (ZYRTEC) 10 MG tablet      2. Otalgia, right  H92.01 Adult ENT  Referral          Start Flonase take as directed     Start Russell Med Nasal Saline  Use high volume russell med saline irrigation.  Use warm distilled water and 2 packets of the salt solution that comes with the bottle, dissolve in bottle up to 240 ml linda.  Irrigate each side of your nose leaning over the sink, using 1/3 to 1/2 the volume of the bottle in each nostril every irrigation.  Irrigate 5 times daily and as needed.    Start cetirizine (ZYRTEC) 10 MG tablet, Take 1 tablet (10 mg) by mouth daily      Ordered an audiogram you can schedule this today.    Follow up with Dr. Hyde or Yana Tran for a Myringotomy     Nayely WHITT  [facility]      Again, thank you for allowing me to participate in the care of your patient.        Sincerely,        Nayely CRANE  [place], NP

## 2023-08-05 ENCOUNTER — MYC MEDICAL ADVICE (OUTPATIENT)
Dept: FAMILY MEDICINE | Facility: OTHER | Age: 68
End: 2023-08-05

## 2023-08-05 DIAGNOSIS — E78.2 MIXED HYPERLIPIDEMIA: Primary | ICD-10-CM

## 2023-08-05 DIAGNOSIS — R41.840 ATTENTION AND CONCENTRATION DEFICIT: ICD-10-CM

## 2023-08-08 DIAGNOSIS — H65.191 ACUTE EFFUSION OF RIGHT EAR: Primary | ICD-10-CM

## 2023-08-15 NOTE — PROGRESS NOTES
Chief Complaint   Patient presents with    Follow Up     HEA/  Right Ear Effusion/possible myringotomy     Patient returns to ENT for follow-up exam.  Patient was seen on 8/1/2023 by Nayely and noted to have an effusion.  She was referred for audiogram and myringotomy.     She was seen in the urgent care on 7/20/2023 for concerns for right otalgia.  At that time ear exam showed no concerns for cerumen.  It is documented that the right TM was translucent with a full blue view of all the bony landmarks.  Is documented that symptoms were consistent with eustachian tube dysfunction.  Recommended treatment with nasal spray and antihistamine or decongestant.  She was referred to ENT.     She reports decreased hearing right ear.   The hearing in the right ear is affected.    No concerns for tinnitus.    No vertigo, feels a little disequilibrium,  She has been feeling fatigued.    No otorrhea.    No hx of COM or otologic surgeries.   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  No real noise exposure  No family history of hearing loss.    No flux hearing.          Audiogram- 8/16/23  Type A Left, Type B Right.  tympanograms  Thresholds are stable left ear normal with mild SNHL and right moderate to moderate-severe conductive HL.   SRT=PTA  WRS-  Right- 100%@80 dB  Left- 100% @55 dB    Past Medical History:   Diagnosis Date    Acquired hypothyroidism 4/25/2016    Chronic serous otitis media, simple or unspecified 06/18/2012    Depression, recurrent 01/01/2011    Dysfunction of eustachian tube 06/18/2012    Estradiol deficiency 4/25/2016    Hypothyroidism 01/01/2011    Mixed hyperlipidemia 4/25/2016    Osteopenia         No Known Allergies  Current Outpatient Medications   Medication    cetirizine (ZYRTEC) 10 MG tablet    fluticasone (FLONASE) 50 MCG/ACT nasal spray    levothyroxine (SYNTHROID/LEVOTHROID) 88 MCG tablet    methylphenidate (RITALIN) 10 MG tablet    pseudoePHEDrine (SUDAFED) 30 MG  "tablet    rosuvastatin (CRESTOR) 10 MG tablet    venlafaxine (EFFEXOR XR) 75 MG 24 hr capsule    vitamin D3 (CHOLECALCIFEROL) 1.25 MG (65350 UT) capsule     Current Facility-Administered Medications   Medication    cyanocobalamin injection 1,000 mcg       ROS- SEE HPI  /78 (BP Location: Left arm, Patient Position: Sitting, Cuff Size: Adult Regular)   Pulse 76   Temp 97.8  F (36.6  C) (Tympanic)   Ht 1.626 m (5' 4\")   Wt 65.8 kg (145 lb)   SpO2 98%   BMI 24.89 kg/m      General - The patient is well nourished and well developed, and appears to have good nutritional status.  Alert and oriented to person and place, answers questions and cooperates with examination appropriately.   Head and Face - Normocephalic and atraumatic, with no gross asymmetry noted.  The facial nerve is intact, with strong symmetric movements.  Voice and Breathing - The patient was breathing comfortably without the use of accessory muscles. There was no wheezing, stridor. The patients voice was clear and strong, and had appropriate pitch and quality.  Ears - The ears were examined with binocular microscopy and with otoscope.  External ears normal. Right canal patent.  Left canal patent.   Right tympanic membrane is intact with serous effusion, no retraction or mass. No movement with valsalva.   Left tympanic membrane is intact without effusion, retraction or mass.  Eyes - Extraocular movements intact, sclera were not icteric or injected, conjunctiva were pink and moist.  Mouth - Examination of the oral cavity showed pink, healthy oral mucosa. Dentition in good condition. No lesions or ulcerations noted. The tongue was mobile and midline.   Throat - The walls of the oropharynx were smooth, pink, moist, symmetric, and had no lesions or ulcerations.  The tonsillar pillars and soft palate were symmetric. The uvula was midline on elevation.    Neck - Full range of motion on passive movement.  Palpation of the occipital, submental, " submandibular, internal jugular chain, and supraclavicular nodes did not demonstrate any abnormal lymph nodes or masses.  Palpation of the thyroid was soft and smooth, with no nodules or goiter appreciated.  The trachea was mobile and midline.  Nose - External contour is symmetric, no gross deflection or scars.  Nasal mucosa is pink and moist with no abnormal mucus.  The septum and turbinates were evaluated with nasal speculum, no polyps, masses, or purulence noted on examination.    Last nasal exam- NP clear, ET patent. Complete on 8/1/23 by JS.         Procedure - RIGHT Myringotomy without tube    Procedure - After discussion of the risks and benefits of myringotomy, informed consent was signed and placed in the chart.  I began with the RIGHT side.  I proceeded to position the patient in a semi-supine position in the examination chair.  Using the binocular surgical microscope, I then proceeded to clean the canal of cerumen and squamous debris.  I was able to see the tympanic membrane.  Using a small cotton tipped applicator, I applied a tiny coating of phenol onto the tympanic membrane.  After visualizing a good shea, I then proceeded to use a myringotomy knife to make a radially oriented incision in the tympanic membrane.  A moderate amount of clear yellow effusion was suctioned away. Ciprodex applied.         A/P -     ICD-10-CM    1. S/P myringotomy  Z96.22       2. Acute effusion of right ear  H65.191 ofloxacin (FLOXIN) 0.3 % otic solution      3. Conductive hearing loss of right ear, unspecified hearing status on contralateral side  H90.11             The patient was instructed on water precautions and given a prescription for floxin drops to use for 7 days.  I will see them in 2 to 4 weeks for follow up and repeat audiogram.  The patient was instructed to call in or return sooner if there was any drainage from the ear.        Yana Tran PA-C  ENT  [facility]

## 2023-08-16 ENCOUNTER — OFFICE VISIT (OUTPATIENT)
Dept: AUDIOLOGY | Facility: OTHER | Age: 68
End: 2023-08-16
Attending: AUDIOLOGIST
Payer: COMMERCIAL

## 2023-08-16 ENCOUNTER — OFFICE VISIT (OUTPATIENT)
Dept: OTOLARYNGOLOGY | Facility: OTHER | Age: 68
End: 2023-08-16
Attending: AUDIOLOGIST
Payer: COMMERCIAL

## 2023-08-16 VITALS
SYSTOLIC BLOOD PRESSURE: 130 MMHG | TEMPERATURE: 97.8 F | DIASTOLIC BLOOD PRESSURE: 78 MMHG | HEIGHT: 64 IN | HEART RATE: 76 BPM | BODY MASS INDEX: 24.75 KG/M2 | WEIGHT: 145 LBS | OXYGEN SATURATION: 98 %

## 2023-08-16 DIAGNOSIS — H90.A22 SENSORINEURAL HEARING LOSS (SNHL) OF LEFT EAR WITH RESTRICTED HEARING OF RIGHT EAR: ICD-10-CM

## 2023-08-16 DIAGNOSIS — H90.11 CONDUCTIVE HEARING LOSS OF RIGHT EAR, UNSPECIFIED HEARING STATUS ON CONTRALATERAL SIDE: ICD-10-CM

## 2023-08-16 DIAGNOSIS — H90.A11 CONDUCTIVE HEARING LOSS OF RIGHT EAR WITH RESTRICTED HEARING OF LEFT EAR: Primary | ICD-10-CM

## 2023-08-16 DIAGNOSIS — H65.191 ACUTE EFFUSION OF RIGHT EAR: ICD-10-CM

## 2023-08-16 DIAGNOSIS — Z96.22 S/P MYRINGOTOMY WITH INSERTION OF TUBE: Primary | ICD-10-CM

## 2023-08-16 DIAGNOSIS — H69.91 DYSFUNCTION OF RIGHT EUSTACHIAN TUBE: ICD-10-CM

## 2023-08-16 PROCEDURE — 69420 INCISION OF EARDRUM: CPT | Mod: RT | Performed by: PHYSICIAN ASSISTANT

## 2023-08-16 PROCEDURE — 69433 CREATE EARDRUM OPENING: CPT | Performed by: PHYSICIAN ASSISTANT

## 2023-08-16 PROCEDURE — 99213 OFFICE O/P EST LOW 20 MIN: CPT | Mod: 25 | Performed by: PHYSICIAN ASSISTANT

## 2023-08-16 PROCEDURE — 92504 EAR MICROSCOPY EXAMINATION: CPT | Performed by: PHYSICIAN ASSISTANT

## 2023-08-16 PROCEDURE — G0463 HOSPITAL OUTPT CLINIC VISIT: HCPCS | Mod: 25 | Performed by: PHYSICIAN ASSISTANT

## 2023-08-16 RX ORDER — OFLOXACIN 3 MG/ML
5 SOLUTION AURICULAR (OTIC) 2 TIMES DAILY
Qty: 4 ML | Refills: 0 | Status: SHIPPED | OUTPATIENT
Start: 2023-08-16 | End: 2023-08-23

## 2023-08-16 ASSESSMENT — PAIN SCALES - GENERAL: PAINLEVEL: NO PAIN (0)

## 2023-08-16 NOTE — LETTER
8/16/2023         RE: Idalia Hoang  [address] [facility] [address]        Dear Colleague,    Thank you for referring your patient, Idalia Hoang, to the [facility]. Please see a copy of my visit note below.    Chief Complaint   Patient presents with     Follow Up     HEA/  Right Ear Effusion/possible myringotomy     Patient returns to ENT for follow-up exam.  Patient was seen on 8/1/2023 by Nayely and noted to have an effusion.  She was referred for audiogram and myringotomy.     She was seen in the urgent care on 7/20/2023 for concerns for right otalgia.  At that time ear exam showed no concerns for cerumen.  It is documented that the right TM was translucent with a full blue view of all the bony landmarks.  Is documented that symptoms were consistent with eustachian tube dysfunction.  Recommended treatment with nasal spray and antihistamine or decongestant.  She was referred to ENT.     She reports decreased hearing right ear.   The hearing in the right ear is affected.    No concerns for tinnitus.    No vertigo, feels a little disequilibrium,  She has been feeling fatigued.    No otorrhea.    No hx of COM or otologic surgeries.   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  No real noise exposure  No family history of hearing loss.    No flux hearing.          Audiogram- 8/16/23  Type A Left, Type B Right.  tympanograms  Thresholds are stable left ear normal with mild SNHL and right moderate to moderate-severe conductive HL.   SRT=PTA  WRS-  Right- 100%@80 dB  Left- 100% @55 dB    Past Medical History:   Diagnosis Date     Acquired hypothyroidism 4/25/2016     Chronic serous otitis media, simple or unspecified 06/18/2012     Depression, recurrent 01/01/2011     Dysfunction of eustachian tube 06/18/2012     Estradiol deficiency 4/25/2016     Hypothyroidism 01/01/2011     Mixed hyperlipidemia 4/25/2016     Osteopenia         No Known Allergies  Current  "Outpatient Medications   Medication     cetirizine (ZYRTEC) 10 MG tablet     fluticasone (FLONASE) 50 MCG/ACT nasal spray     levothyroxine (SYNTHROID/LEVOTHROID) 88 MCG tablet     methylphenidate (RITALIN) 10 MG tablet     pseudoePHEDrine (SUDAFED) 30 MG tablet     rosuvastatin (CRESTOR) 10 MG tablet     venlafaxine (EFFEXOR XR) 75 MG 24 hr capsule     vitamin D3 (CHOLECALCIFEROL) 1.25 MG (47360 UT) capsule     Current Facility-Administered Medications   Medication     cyanocobalamin injection 1,000 mcg       ROS- SEE HPI  /78 (BP Location: Left arm, Patient Position: Sitting, Cuff Size: Adult Regular)   Pulse 76   Temp 97.8  F (36.6  C) (Tympanic)   Ht 1.626 m (5' 4\")   Wt 65.8 kg (145 lb)   SpO2 98%   BMI 24.89 kg/m      General - The patient is well nourished and well developed, and appears to have good nutritional status.  Alert and oriented to person and place, answers questions and cooperates with examination appropriately.   Head and Face - Normocephalic and atraumatic, with no gross asymmetry noted.  The facial nerve is intact, with strong symmetric movements.  Voice and Breathing - The patient was breathing comfortably without the use of accessory muscles. There was no wheezing, stridor. The patients voice was clear and strong, and had appropriate pitch and quality.  Ears - The ears were examined with binocular microscopy and with otoscope.  External ears normal. Right canal patent.  Left canal patent.   Right tympanic membrane is intact with serous effusion, no retraction or mass. No movement with valsalva.   Left tympanic membrane is intact without effusion, retraction or mass.  Eyes - Extraocular movements intact, sclera were not icteric or injected, conjunctiva were pink and moist.  Mouth - Examination of the oral cavity showed pink, healthy oral mucosa. Dentition in good condition. No lesions or ulcerations noted. The tongue was mobile and midline.   Throat - The walls of the oropharynx " were smooth, pink, moist, symmetric, and had no lesions or ulcerations.  The tonsillar pillars and soft palate were symmetric. The uvula was midline on elevation.    Neck - Full range of motion on passive movement.  Palpation of the occipital, submental, submandibular, internal jugular chain, and supraclavicular nodes did not demonstrate any abnormal lymph nodes or masses.  Palpation of the thyroid was soft and smooth, with no nodules or goiter appreciated.  The trachea was mobile and midline.  Nose - External contour is symmetric, no gross deflection or scars.  Nasal mucosa is pink and moist with no abnormal mucus.  The septum and turbinates were evaluated with nasal speculum, no polyps, masses, or purulence noted on examination.    Last nasal exam- NP clear, ET patent. Complete on 8/1/23 by GABI.         Procedure - RIGHT Myringotomy without tube    Procedure - After discussion of the risks and benefits of myringotomy, informed consent was signed and placed in the chart.  I began with the RIGHT side.  I proceeded to position the patient in a semi-supine position in the examination chair.  Using the binocular surgical microscope, I then proceeded to clean the canal of cerumen and squamous debris.  I was able to see the tympanic membrane.  Using a small cotton tipped applicator, I applied a tiny coating of phenol onto the tympanic membrane.  After visualizing a good shea, I then proceeded to use a myringotomy knife to make a radially oriented incision in the tympanic membrane.  A moderate amount of clear yellow effusion was suctioned away. Ciprodex applied.         A/P -     ICD-10-CM    1. S/P myringotomy  Z96.22       2. Acute effusion of right ear  H65.191 ofloxacin (FLOXIN) 0.3 % otic solution      3. Conductive hearing loss of right ear, unspecified hearing status on contralateral side  H90.11             The patient was instructed on water precautions and given a prescription for floxin drops to use for 7  days.  I will see them in 2 to 4 weeks for follow up and repeat audiogram.  The patient was instructed to call in or return sooner if there was any drainage from the ear.        Yana Tran PA-C  ENT  [facility] [place]      Again, thank you for allowing me to participate in the care of your patient.        Sincerely,        Yana Tran PA-C

## 2023-08-16 NOTE — PROGRESS NOTES
Audiology Evaluation Completed. Please refer SCANNED AUDIOGRAM and/or TYMPANOGRAM for BACKGROUND, RESULTS, RECOMMENDATIONS.      Effie MUELLER, [facility]  Audiologist #[id number]

## 2023-08-16 NOTE — PATIENT INSTRUCTIONS
----- Message from Zane Vela sent at 7/14/2022  3:49 PM EDT -----  Regarding: Question about stimulants  Hello,  I left a voicemail with your medical assistant, but wanted to be sure my message wasn't lost by accident. I recieved a call saying that you would not approve me to be put on Adderall to treat my ADHD, which is fine, if that's wats best for my heart health I understand. However, I wanted to know if that extended to all stimulants, or just specifically Adderall, as stimulants are the primary (and possibly only, I'm unsure) way to medicate ADHD.  Either way, my psychologist's office, [facility], requires a formal letter stating this information, so that they know how to treat me moving forward.  Thank you,  Zane Vela   Right myringotomy completed.   Start Floxin 5 drops twice a day for 7 days.   Try to maintain water precautions for right ear until follow up.     Follow up in 4 weeks with audiogram.   Thank you for allowing Denise Roblero our ENT team to participate in your care.  If your medications are too expensive, please give the nurse a call.  We can possibly change this medication.  If you have a scheduling or an appointment question please contact our Health Unit Coordinator at their direct line [phone] ext [phone].   ALL nursing questions or concerns can be directed to your ENT nurse at: [phone] - [place]             Recovery - The placement of ear tubes is a brief operation, and therefore the recovery from the anesthetic is usually less than a day.  However, in young children the sleep patterns, feeding, and behavior can be altered for several days.  Try to return to the daily routine as soon as possible and this issue will resolve without problems.  There are no restrictions to diet or activity after ear tube placement.    Medications - Children and adults can return to all preoperative medications after this procedure, including blood thinners.  You were sent home with ear drops, please use them as directed to assist in the rapid healing of the ear drum around the tube.  Pain medication may have been sent home with you, but a vast majority of the time, over the counter Tylenol or ibuprofen (advil) I sufficient. Finish prescription ear drops (5 drops twice a day).     Complications - A low grade fever (up to 100 degrees ) is not unusual in the day after tubes are placed.  Treat this with cool wash cloths to the forehead and Tylenol.  If the fever is higher, or does not respond to medication, call the Doctor s office or call service after hours.  A small amount of bloody drainage can occur for a day or two after ear tubes, and is perfectly normal, continue the ear drops as directed and it will clear  up.      Follow up - Approximately 1 month after the tubes are placed I like to examine the ears to make sure there are no signs of complications, which are extremely rare.  You should already have an appointment in 1 month with ENT PA and audiology.  If not, call our office at [phone].  In some unusual cases the ears  reject  the tubes.  Depending on the situation, a hearing test may or may not be performed at that time.  Afterwards, follow up is done every 6 months, but of course earlier if there are any issues or problems.    Advantages of Tubes - After ear tube placement, there are certain benefits from having a direct communication of the middle ear space with the ear canal.  In the event of drainage from the ears with ear tubes in place ( which is common with colds and flus ) use the ear drops you were discharged home with using the same dosage and instructions.  This will clear up the ears without the need for oral antibiotics a majority of the time.  Another advantage is that with tubes in place, the ears automatically adjust to changes in atmospheric pressure ( such as in airplanes or elevation ).  In other words, if the tubes are open the ears will not hurt or pop!

## 2023-08-24 NOTE — TELEPHONE ENCOUNTER
Scheduled pt for 9.1.2023 for vaginal discomfort. She is also going to do her cholesterol labs that AM before the appt.   Are you going to want more labs?    Please advise and wants RX filled below.    Ritalin  Last Written Prescription Date:  5.26.2023  Last Fill Quantity: 60,   # refills: 0  Last Office Visit: 5.26.2023  Future Office visit:    Next 5 appointments (look out 90 days)      Aug 28, 2023  8:30 AM  Nurse Only with HC FP NURSE  [facility] [place] ([facility] [place] ) [address]  [place] [address]  [phone]     Sep 01, 2023  9:15 AM  (Arrive by 9:00 AM)  SHORT with MARCOS Nelson  [facility] [place] ([facility] [place] ) [address]  [place] [address]  [phone]     Sep 28, 2023  8:30 AM  Nurse Only with HC FP NURSE  [facility] [place] ([facility] [place] ) [address]  [place] [address]  [phone]     Nov 13, 2023 10:30 AM  (Arrive by 10:15 AM)  Return Visit with Nayely Jiang NP  [facility] [place] ([facility] [place] ) [address]  [place] [address]  [phone]             Routing refill request to provider for review/approval because:  Drug not on the [facility] or [facility] refill protocol or controlled substance    Lakeisha ZEPEDA RN

## 2023-08-28 ENCOUNTER — ALLIED HEALTH/NURSE VISIT (OUTPATIENT)
Dept: FAMILY MEDICINE | Facility: OTHER | Age: 68
End: 2023-08-28
Attending: PHYSICIAN ASSISTANT
Payer: COMMERCIAL

## 2023-08-28 DIAGNOSIS — E53.8 VITAMIN B12 DEFICIENCY (NON ANEMIC): ICD-10-CM

## 2023-08-28 DIAGNOSIS — E53.8 VITAMIN B12 DEFICIENCY DISEASE: ICD-10-CM

## 2023-08-28 PROCEDURE — 96372 THER/PROPH/DIAG INJ SC/IM: CPT | Performed by: PHYSICIAN ASSISTANT

## 2023-08-28 PROCEDURE — 250N000011 HC RX IP 250 OP 636: Performed by: PHYSICIAN ASSISTANT

## 2023-08-28 RX ORDER — METHYLPHENIDATE HYDROCHLORIDE 10 MG/1
10 TABLET ORAL 2 TIMES DAILY
Qty: 60 TABLET | Refills: 0 | Status: SHIPPED | OUTPATIENT
Start: 2023-08-28 | End: 2023-09-01

## 2023-08-28 RX ADMIN — CYANOCOBALAMIN 1000 MCG: 1000 INJECTION, SOLUTION INTRAMUSCULAR; SUBCUTANEOUS at 08:25

## 2023-09-01 ENCOUNTER — LAB (OUTPATIENT)
Dept: LAB | Facility: OTHER | Age: 68
End: 2023-09-01
Attending: PHYSICIAN ASSISTANT
Payer: COMMERCIAL

## 2023-09-01 ENCOUNTER — OFFICE VISIT (OUTPATIENT)
Dept: FAMILY MEDICINE | Facility: OTHER | Age: 68
End: 2023-09-01
Attending: PHYSICIAN ASSISTANT
Payer: COMMERCIAL

## 2023-09-01 VITALS
DIASTOLIC BLOOD PRESSURE: 82 MMHG | HEART RATE: 81 BPM | WEIGHT: 152.7 LBS | TEMPERATURE: 98.5 F | OXYGEN SATURATION: 96 % | SYSTOLIC BLOOD PRESSURE: 120 MMHG | BODY MASS INDEX: 26.21 KG/M2

## 2023-09-01 DIAGNOSIS — N95.2 ATROPHIC VAGINITIS: Primary | ICD-10-CM

## 2023-09-01 DIAGNOSIS — R41.840 ATTENTION AND CONCENTRATION DEFICIT: ICD-10-CM

## 2023-09-01 DIAGNOSIS — E78.2 MIXED HYPERLIPIDEMIA: ICD-10-CM

## 2023-09-01 DIAGNOSIS — E78.5 HYPERLIPIDEMIA LDL GOAL <100: ICD-10-CM

## 2023-09-01 LAB
ALT SERPL W P-5'-P-CCNC: 14 U/L (ref 0–50)
AST SERPL W P-5'-P-CCNC: 18 U/L (ref 0–45)
CHOLEST SERPL-MCNC: 283 MG/DL
CK SERPL-CCNC: 78 U/L (ref 26–192)
HDLC SERPL-MCNC: 57 MG/DL
LDLC SERPL CALC-MCNC: 192 MG/DL
NONHDLC SERPL-MCNC: 226 MG/DL
TRIGL SERPL-MCNC: 169 MG/DL

## 2023-09-01 PROCEDURE — 84450 TRANSFERASE (AST) (SGOT): CPT | Mod: ZL

## 2023-09-01 PROCEDURE — 36415 COLL VENOUS BLD VENIPUNCTURE: CPT | Mod: ZL

## 2023-09-01 PROCEDURE — 84460 ALANINE AMINO (ALT) (SGPT): CPT | Mod: ZL

## 2023-09-01 PROCEDURE — 99213 OFFICE O/P EST LOW 20 MIN: CPT | Performed by: PHYSICIAN ASSISTANT

## 2023-09-01 PROCEDURE — G0463 HOSPITAL OUTPT CLINIC VISIT: HCPCS | Performed by: PHYSICIAN ASSISTANT

## 2023-09-01 PROCEDURE — 80061 LIPID PANEL: CPT | Mod: ZL

## 2023-09-01 PROCEDURE — 82550 ASSAY OF CK (CPK): CPT | Mod: ZL

## 2023-09-01 RX ORDER — METHYLPHENIDATE HYDROCHLORIDE 10 MG/1
10 TABLET ORAL 2 TIMES DAILY
Qty: 60 TABLET | Refills: 0 | Status: SHIPPED | OUTPATIENT
Start: 2023-09-01 | End: 2023-10-09

## 2023-09-01 RX ORDER — PRAVASTATIN SODIUM 20 MG
20 TABLET ORAL DAILY
Qty: 30 TABLET | Refills: 3 | Status: SHIPPED | OUTPATIENT
Start: 2023-09-01 | End: 2024-05-03

## 2023-09-01 ASSESSMENT — PAIN SCALES - GENERAL: PAINLEVEL: NO PAIN (0)

## 2023-09-01 NOTE — PROGRESS NOTES
Assessment & Plan     Attention and concentration deficit  Has been helping her focus anyway.  She realized after she went off it , it did help her.   - methylphenidate (RITALIN) 10 MG tablet; Take 1 tablet (10 mg) by mouth 2 times daily    Atrophic vaginitis  She will be given this.  Her mammogram is up ot date and her sister did have breast cancer. She will be given this as in study was found to have minimal absorption.  She is going to let me know if helping her sx. Camilo glide is recommended.   - estradiol (ESTRING) 7.5 MCG/24HR vaginal ring; Place 1 each (1 Vaginal ring) vaginally every 3 months    Hyperlipidemia LDL goal <100  LDL off meds is at 192.  We will be giving her PRAVASTATIN.   She will be rechecked in 3 months.   - pravastatin (PRAVACHOL) 20 MG tablet; Take 1 tablet (20 mg) by mouth daily    Review of external notes as documented elsewhere in note  Ordering of each unique test  Prescription drug management  15  minutes spent by me on the date of the encounter doing chart review, history and exam, documentation and further activities per the note       See Patient Instructions    No follow-ups on file.    MARCOS John  [facility] is a 67 year old, presenting for the following health issues:  Vaginal Problem        9/1/2023     8:32 AM   Additional Questions   Roomed by Martin Kamara LPN   Accompanied by self         9/1/2023     8:32 AM   Patient Reported Additional Medications   Patient reports taking the following new medications none       HPI     Vaginal Symptoms  Onset/Duration: 9/18/2023  Description:  Vaginal Discharge: none   Itching (Pruritis): No  Burning sensation:  No  Odor: No  Accompanying Signs & Symptoms:  Urinary symptoms: YES- Constricted and dry  Abdominal pain: No  Fever: No  History:   Sexually active: No but [facility] and [facility] but it did not work  New Partner: No  Possibility of Pregnancy:   N/A  Recent antibiotic use: No  Previous vaginitis issues: No  Precipitating or alleviating factors: None  Therapies tried and outcome: Patient used some coconut oil inside vagina to help with the dryness         Review of Systems   Constitutional, HEENT, cardiovascular, pulmonary, gi and gu systems are negative, except as otherwise noted.      Objective    /82 (BP Location: Right arm, Patient Position: Sitting, Cuff Size: Adult Large)   Pulse 81   Temp 98.5  F (36.9  C) (Tympanic)   Wt 69.3 kg (152 lb 11.2 oz)   SpO2 96%   BMI 26.21 kg/m    Body mass index is 26.21 kg/m .  Physical Exam   GENERAL: healthy, alert and no distress  EYES: Eyes grossly normal to inspection, PERRL and conjunctivae and sclerae normal  HENT: ear canals and TM's normal, nose and mouth without ulcers or lesions  NECK: no adenopathy, no asymmetry, masses, or scars and thyroid normal to palpation  RESP: lungs clear to auscultation - no rales, rhonchi or wheezes  CV: regular rate and rhythm, normal S1 S2, no S3 or S4, no murmur, click or rub, no peripheral edema and peripheral pulses strong  ABDOMEN: soft, nontender, no hepatosplenomegaly, no masses and bowel sounds normal  MS: no gross musculoskeletal defects noted, no edema  SKIN: no suspicious lesions or rashes  NEURO: Normal strength and tone, mentation intact and speech normal  BACK: no CVA tenderness, no paralumbar tenderness  PSYCH: mentation appears normal, affect normal/bright    Lab on 09/01/2023   Component Date Value Ref Range Status    Cholesterol 09/01/2023 283 (H)  <200 mg/dL Final    Triglycerides 09/01/2023 169 (H)  <150 mg/dL Final    Direct Measure HDL 09/01/2023 57  >=50 mg/dL Final    LDL Cholesterol Calculated 09/01/2023 192 (H)  <=100 mg/dL Final    Non HDL Cholesterol 09/01/2023 226 (H)  <130 mg/dL Final    CK 09/01/2023 78  26 - 192 U/L Final    AST 09/01/2023 18  0 - 45 U/L Final    Reference intervals for this test were updated on 6/12/2023 to more  accurately reflect our healthy population. There may be differences in the flagging of prior results with similar values performed with this method. Interpretation of those prior results can be made in the context of the updated reference intervals.    ALT 09/01/2023 14  0 - 50 U/L Final    Reference intervals for this test were updated on 6/12/2023 to more accurately reflect our healthy population. There may be differences in the flagging of prior results with similar values performed with this method. Interpretation of those prior results can be made in the context of the updated reference intervals.

## 2023-09-13 ENCOUNTER — OFFICE VISIT (OUTPATIENT)
Dept: AUDIOLOGY | Facility: OTHER | Age: 68
End: 2023-09-13
Attending: AUDIOLOGIST
Payer: COMMERCIAL

## 2023-09-13 ENCOUNTER — OFFICE VISIT (OUTPATIENT)
Dept: OTOLARYNGOLOGY | Facility: OTHER | Age: 68
End: 2023-09-13
Attending: NURSE PRACTITIONER
Payer: COMMERCIAL

## 2023-09-13 VITALS
DIASTOLIC BLOOD PRESSURE: 76 MMHG | WEIGHT: 150 LBS | SYSTOLIC BLOOD PRESSURE: 124 MMHG | BODY MASS INDEX: 25.61 KG/M2 | HEIGHT: 64 IN | HEART RATE: 80 BPM | TEMPERATURE: 99.4 F | OXYGEN SATURATION: 97 %

## 2023-09-13 DIAGNOSIS — H90.3 ASYMMETRICAL SENSORINEURAL HEARING LOSS: ICD-10-CM

## 2023-09-13 DIAGNOSIS — Z98.890 HISTORY OF MYRINGOTOMY: Primary | ICD-10-CM

## 2023-09-13 DIAGNOSIS — H90.42 SENSORINEURAL HEARING LOSS (SNHL) OF LEFT EAR WITH UNRESTRICTED HEARING OF RIGHT EAR: Primary | ICD-10-CM

## 2023-09-13 PROCEDURE — 92556 SPEECH AUDIOMETRY COMPLETE: CPT | Performed by: AUDIOLOGIST

## 2023-09-13 PROCEDURE — G0463 HOSPITAL OUTPT CLINIC VISIT: HCPCS | Mod: 25

## 2023-09-13 PROCEDURE — 92567 TYMPANOMETRY: CPT | Mod: 52,RT | Performed by: AUDIOLOGIST

## 2023-09-13 PROCEDURE — 92504 EAR MICROSCOPY EXAMINATION: CPT | Performed by: NURSE PRACTITIONER

## 2023-09-13 PROCEDURE — 92552 PURE TONE AUDIOMETRY AIR: CPT | Performed by: AUDIOLOGIST

## 2023-09-13 PROCEDURE — 99213 OFFICE O/P EST LOW 20 MIN: CPT | Mod: 25 | Performed by: NURSE PRACTITIONER

## 2023-09-13 ASSESSMENT — PAIN SCALES - GENERAL: PAINLEVEL: NO PAIN (0)

## 2023-09-13 NOTE — PROGRESS NOTES
Audiology Evaluation Completed. Please refer SCANNED AUDIOGRAM and/or TYMPANOGRAM for BACKGROUND, RESULTS, RECOMMENDATIONS.      Effie MUELLER, [facility]  Audiologist #[id number]

## 2023-09-13 NOTE — LETTER
9/13/2023         RE: Idalia Hoang  [address] [facility] [address]        Dear Colleague,    Thank you for referring your patient, Idalia Hoang, to the [facility]. Please see a copy of my visit note below.    Otolaryngology Note         Chief Complaint:     Patient presents with:  Follow Up: S/P Myringotomy            History of Present Illness:     Idalia Hoang is a 67 year old female seen today for follow-up right myringotomy without tube placement.    She was last seen in ENT on 8/16/2023 by Yana Tran for right-sided conductive hearing loss, right-sided serous effusion and a right sided myringotomy without tube placement.  This note was reviewed.    No tinnitus, hearing is improved, no further ear fullness or crackling.    She feels a bit off kilter today, no rotary vertigo.  Also has a dull headache    No flux hearing.    No focal neuro deficits.     Preprocedure audiogram completed 8/16/2023:  Tympanograms are Type A for left ear suggesting normal eardrum mobility and Type B right-restricted mobility.  Thresholds are stable left normal with mild sensorineural hearing loss 3-4kHz rising to normal range. Right moderate to moderate-severe  conductive hearing loss.  Speech reception thresholds are in good agreement with pure tone average.  Word discrimination scores are excellent at supra-thresholds level.    Repeat audiogram completed 9/13/2023:  Tympanograms are Type A for right ear suggesting normal eardrum mobility.  Thresholds are tested both ears to confirm asymmetry. normal hearing range right and left with asymmetrical sensorineural hearing loss  3-4kHz.  Speech reception thresholds are in good agreement with pure tone average.  Word discrimination scores are excellent at supra-thresholds level.    MRI brain with and without contrast completed 4/10/2023:  FINDINGS: There are some areas of white matter bright signal in both  cerebral hemispheres consistent with small vessel  changes. The  ventricular system is normal in size. There are no enhancing masses or  ventricular shifts or extracerebral collections. The brainstem and  cerebellum appear normal. The pituitary and optic chiasm are normal.  The basal cisterns and internal auditory canals are normal. Cranial  vault is intact. Mucosal thickening is seen in the maxillary and  ethmoid sinuses.                                                                        IMPRESSION: Small vessel changes in both hemispheres. No masses or  ventricular shifts         Medications:     Current Outpatient Rx   Medication Sig Dispense Refill     cetirizine (ZYRTEC) 10 MG tablet Take 1 tablet (10 mg) by mouth daily 30 tablet 1     estradiol (ESTRING) 7.5 MCG/24HR vaginal ring Place 1 each (1 Vaginal ring) vaginally every 3 months 1 each 4     fluticasone (FLONASE) 50 MCG/ACT nasal spray Spray 1 spray into both nostrils daily 18.2 mL 0     levothyroxine (SYNTHROID/LEVOTHROID) 88 MCG tablet Take 1 tablet by mouth once daily 90 tablet 2     methylphenidate (RITALIN) 10 MG tablet Take 1 tablet (10 mg) by mouth 2 times daily 60 tablet 0     pravastatin (PRAVACHOL) 20 MG tablet Take 1 tablet (20 mg) by mouth daily 30 tablet 3     pseudoePHEDrine (SUDAFED) 30 MG tablet Take 30 mg by mouth every 6 hours as needed for congestion       rosuvastatin (CRESTOR) 10 MG tablet Take 1 tablet (10 mg) by mouth daily 90 tablet 1     venlafaxine (EFFEXOR XR) 75 MG 24 hr capsule Take 1 capsule by mouth once daily 90 capsule 0     vitamin D3 (CHOLECALCIFEROL) 1.25 MG (49196 UT) capsule Take 1 capsule (50,000 Units) by mouth every 7 days 12 capsule 3            Allergies:     Allergies: Patient has no known allergies.          Past Medical History:     Past Medical History:   Diagnosis Date     Acquired hypothyroidism 4/25/2016     Chronic serous otitis media, simple or unspecified 06/18/2012     Depression, recurrent 01/01/2011     Dysfunction of eustachian tube 06/18/2012  "    Estradiol deficiency 4/25/2016     Hypothyroidism 01/01/2011     Mixed hyperlipidemia 4/25/2016     Osteopenia             Past Surgical History:     Past Surgical History:   Procedure Laterality Date     COLONOSCOPY  04/20/2012     COLONOSCOPY N/A 11/17/2022    Procedure: COLONOSCOPY;  Surgeon: Eduardo Subramanian MD;  Location: [facility] OR     PHACOEMULSIFICATION WITH STANDARD INTRAOCULAR LENS IMPLANT Left 4/12/2022    Procedure: PHACOEMULSIFICATION CATARACT EXTRACTION POSTERIOR CHAMBER LENS LEFT EYE;  Surgeon: Tremayne Crawford MD;  Location: [facility] OR     surgical removal  2010    tendon nodule; free of disease       ENT family history reviewed         Social History:     Social History     Tobacco Use     Smoking status: Former     Packs/day: 0.30     Years: 1.00     Pack years: 0.30     Types: Cigarettes     Smokeless tobacco: Never     Tobacco comments:     quit 1989; no passive exposure   Vaping Use     Vaping Use: Never used   Substance Use Topics     Alcohol use: Yes     Comment: 1 drink beer and wine daily     Drug use: No            Review of Systems:     ROS: See HPI         Physical Exam:     /76 (Cuff Size: Adult Regular)   Pulse 80   Temp 99.4  F (37.4  C) (Tympanic)   Ht 1.626 m (5' 4\")   Wt 68 kg (150 lb)   SpO2 97%   BMI 25.75 kg/m      General - The patient is well nourished and well developed, and appears to have good nutritional status.  Alert and oriented to person and place, answers questions and cooperates with examination appropriately.   Head and Face - Normocephalic and atraumatic, with no gross asymmetry noted.  The facial nerve is intact, with strong symmetric movements.  Voice and Breathing - The patient was breathing comfortably without the use of accessory muscles. There was no wheezing, stridor. The patients voice was clear and strong, and had appropriate pitch and quality.  Ears - External ear normal. Canals are patent.  The ears were examined under binocular microscopy and " with otoscope.  Bilateral tympanic membranes are intact without effusion or retraction.  Eyes - Extraocular movements intact, sclera were not icteric or injected.  Mouth - Examination of the oral cavity showed pink, healthy oral mucosa. Dentition in good condition. No lesions or ulcerations noted. The tongue was mobile and midline.   Throat - The walls of the oropharynx were smooth, pink, moist, symmetric, and had no lesions or ulcerations.  The tonsillar pillars and soft palate were symmetric. The uvula was midline on elevation.    Neck - Normal range of motion, no palpable lymphadenopathy.  Palpation of the thyroid was soft and smooth, with no nodules or goiter appreciated.  The trachea was mobile and midline.  Nose - External contour is symmetric, no gross deflection or scars.  Nasal mucosa is pink and moist with no abnormal mucus.  The septum and turbinates were evaluated with nasal speculum, no polyps, masses, or purulence noted on examination.         Assessment and Plan:       ICD-10-CM    1. History of myringotomy  Z98.890       2. Asymmetrical sensorineural hearing loss  H90.3         Right-sided conductive hearing loss has resolved.  She does have slight asymmetry, MRI of the brain did show normal IACs in April.  No indications for repeat imaging at this time.  Recommend annual hearing exam, follow-up sooner with new or worsening symptoms.    Nayely WHITT  [facility] ENT      Again, thank you for allowing me to participate in the care of your patient.        Sincerely,        Nayely Jiang NP

## 2023-09-13 NOTE — PROGRESS NOTES
Otolaryngology Note         Chief Complaint:     Patient presents with:  Follow Up: S/P Myringotomy            History of Present Illness:     Idalia Hoang is a 67 year old female seen today for follow-up right myringotomy without tube placement.    She was last seen in ENT on 8/16/2023 by Yana Tran for right-sided conductive hearing loss, right-sided serous effusion and a right sided myringotomy without tube placement.  This note was reviewed.    No tinnitus, hearing is improved, no further ear fullness or crackling.    She feels a bit off kilter today, no rotary vertigo.  Also has a dull headache    No flux hearing.    No focal neuro deficits.     Preprocedure audiogram completed 8/16/2023:  Tympanograms are Type A for left ear suggesting normal eardrum mobility and Type B right-restricted mobility.  Thresholds are stable left normal with mild sensorineural hearing loss 3-4kHz rising to normal range. Right moderate to moderate-severe  conductive hearing loss.  Speech reception thresholds are in good agreement with pure tone average.  Word discrimination scores are excellent at supra-thresholds level.    Repeat audiogram completed 9/13/2023:  Tympanograms are Type A for right ear suggesting normal eardrum mobility.  Thresholds are tested both ears to confirm asymmetry. normal hearing range right and left with asymmetrical sensorineural hearing loss  3-4kHz.  Speech reception thresholds are in good agreement with pure tone average.  Word discrimination scores are excellent at supra-thresholds level.    MRI brain with and without contrast completed 4/10/2023:  FINDINGS: There are some areas of white matter bright signal in both  cerebral hemispheres consistent with small vessel changes. The  ventricular system is normal in size. There are no enhancing masses or  ventricular shifts or extracerebral collections. The brainstem and  cerebellum appear normal. The pituitary and optic chiasm are normal.  The basal  cisterns and internal auditory canals are normal. Cranial  vault is intact. Mucosal thickening is seen in the maxillary and  ethmoid sinuses.                                                                        IMPRESSION: Small vessel changes in both hemispheres. No masses or  ventricular shifts         Medications:     Current Outpatient Rx   Medication Sig Dispense Refill    cetirizine (ZYRTEC) 10 MG tablet Take 1 tablet (10 mg) by mouth daily 30 tablet 1    estradiol (ESTRING) 7.5 MCG/24HR vaginal ring Place 1 each (1 Vaginal ring) vaginally every 3 months 1 each 4    fluticasone (FLONASE) 50 MCG/ACT nasal spray Spray 1 spray into both nostrils daily 18.2 mL 0    levothyroxine (SYNTHROID/LEVOTHROID) 88 MCG tablet Take 1 tablet by mouth once daily 90 tablet 2    methylphenidate (RITALIN) 10 MG tablet Take 1 tablet (10 mg) by mouth 2 times daily 60 tablet 0    pravastatin (PRAVACHOL) 20 MG tablet Take 1 tablet (20 mg) by mouth daily 30 tablet 3    pseudoePHEDrine (SUDAFED) 30 MG tablet Take 30 mg by mouth every 6 hours as needed for congestion      rosuvastatin (CRESTOR) 10 MG tablet Take 1 tablet (10 mg) by mouth daily 90 tablet 1    venlafaxine (EFFEXOR XR) 75 MG 24 hr capsule Take 1 capsule by mouth once daily 90 capsule 0    vitamin D3 (CHOLECALCIFEROL) 1.25 MG (34977 UT) capsule Take 1 capsule (50,000 Units) by mouth every 7 days 12 capsule 3            Allergies:     Allergies: Patient has no known allergies.          Past Medical History:     Past Medical History:   Diagnosis Date    Acquired hypothyroidism 4/25/2016    Chronic serous otitis media, simple or unspecified 06/18/2012    Depression, recurrent 01/01/2011    Dysfunction of eustachian tube 06/18/2012    Estradiol deficiency 4/25/2016    Hypothyroidism 01/01/2011    Mixed hyperlipidemia 4/25/2016    Osteopenia             Past Surgical History:     Past Surgical History:   Procedure Laterality Date    COLONOSCOPY  04/20/2012    COLONOSCOPY N/A  "11/17/2022    Procedure: COLONOSCOPY;  Surgeon: Edurado Subramanian MD;  Location: [facility] OR    PHACOEMULSIFICATION WITH STANDARD INTRAOCULAR LENS IMPLANT Left 4/12/2022    Procedure: PHACOEMULSIFICATION CATARACT EXTRACTION POSTERIOR CHAMBER LENS LEFT EYE;  Surgeon: Tremayne Crawford MD;  Location: [facility] OR    surgical removal  2010    tendon nodule; free of disease       ENT family history reviewed         Social History:     Social History     Tobacco Use    Smoking status: Former     Packs/day: 0.30     Years: 1.00     Pack years: 0.30     Types: Cigarettes    Smokeless tobacco: Never    Tobacco comments:     quit 1989; no passive exposure   Vaping Use    Vaping Use: Never used   Substance Use Topics    Alcohol use: Yes     Comment: 1 drink beer and wine daily    Drug use: No            Review of Systems:     ROS: See HPI         Physical Exam:     /76 (Cuff Size: Adult Regular)   Pulse 80   Temp 99.4  F (37.4  C) (Tympanic)   Ht 1.626 m (5' 4\")   Wt 68 kg (150 lb)   SpO2 97%   BMI 25.75 kg/m      General - The patient is well nourished and well developed, and appears to have good nutritional status.  Alert and oriented to person and place, answers questions and cooperates with examination appropriately.   Head and Face - Normocephalic and atraumatic, with no gross asymmetry noted.  The facial nerve is intact, with strong symmetric movements.  Voice and Breathing - The patient was breathing comfortably without the use of accessory muscles. There was no wheezing, stridor. The patients voice was clear and strong, and had appropriate pitch and quality.  Ears - External ear normal. Canals are patent.  The ears were examined under binocular microscopy and with otoscope.  Bilateral tympanic membranes are intact without effusion or retraction.  Eyes - Extraocular movements intact, sclera were not icteric or injected.  Mouth - Examination of the oral cavity showed pink, healthy oral mucosa. Dentition in good " condition. No lesions or ulcerations noted. The tongue was mobile and midline.   Throat - The walls of the oropharynx were smooth, pink, moist, symmetric, and had no lesions or ulcerations.  The tonsillar pillars and soft palate were symmetric. The uvula was midline on elevation.    Neck - Normal range of motion, no palpable lymphadenopathy.  Palpation of the thyroid was soft and smooth, with no nodules or goiter appreciated.  The trachea was mobile and midline.  Nose - External contour is symmetric, no gross deflection or scars.  Nasal mucosa is pink and moist with no abnormal mucus.  The septum and turbinates were evaluated with nasal speculum, no polyps, masses, or purulence noted on examination.         Assessment and Plan:       ICD-10-CM    1. History of myringotomy  Z98.890       2. Asymmetrical sensorineural hearing loss  H90.3         Right-sided conductive hearing loss has resolved.  She does have slight asymmetry, MRI of the brain did show normal IACs in April.  No indications for repeat imaging at this time.  Recommend annual hearing exam, follow-up sooner with new or worsening symptoms.    Nayely WHITT  [facility] ENT

## 2023-09-13 NOTE — PATIENT INSTRUCTIONS
Thank you for allowing Nayely Jiang and our ENT team to participate in your care.  If your medications are too expensive, please give the nurse a call.  We can possibly change this medication.  If you have a scheduling or an appointment question please contact our Health Unit Coordinator at their direct line [phone].   ALL nursing questions or concerns can be directed to your ENT nurse at: [phone] - [facility]

## 2023-09-18 ENCOUNTER — TELEPHONE (OUTPATIENT)
Dept: FAMILY MEDICINE | Facility: OTHER | Age: 68
End: 2023-09-18

## 2023-10-06 DIAGNOSIS — R41.840 ATTENTION AND CONCENTRATION DEFICIT: ICD-10-CM

## 2023-10-06 DIAGNOSIS — F33.9 EPISODE OF RECURRENT MAJOR DEPRESSIVE DISORDER, UNSPECIFIED DEPRESSION EPISODE SEVERITY (H): ICD-10-CM

## 2023-10-09 RX ORDER — METHYLPHENIDATE HYDROCHLORIDE 10 MG/1
10 TABLET ORAL 2 TIMES DAILY
Qty: 60 TABLET | Refills: 0 | Status: SHIPPED | OUTPATIENT
Start: 2023-10-09 | End: 2023-12-01

## 2023-10-09 RX ORDER — VENLAFAXINE HYDROCHLORIDE 75 MG/1
CAPSULE, EXTENDED RELEASE ORAL
Qty: 90 CAPSULE | Refills: 1 | Status: SHIPPED | OUTPATIENT
Start: 2023-10-09 | End: 2024-05-03

## 2023-10-09 NOTE — TELEPHONE ENCOUNTER
Ritalin      Last Written Prescription Date:  9.1.23  Last Fill Quantity: #60,   # refills: 0  Last Office Visit: 9.1.23  Future Office visit:    Next 5 appointments (look out 90 days)      Nov 13, 2023 10:30 AM  (Arrive by 10:15 AM)  Return Visit with Nayely Jiang NP  [facility] ([facility] ) [address] [place] [address]  [phone]     Dec 01, 2023  8:15 AM  (Arrive by 8:00 AM)  SHORT with MARCOS Nelson  [facility] ([facility] [place] ) [address] [facility] [address]  [phone]             Routing refill request to provider for review/approval because:  Drug not on the [facility], P or [facility] refill protocol or controlled substance

## 2023-10-29 NOTE — TELEPHONE ENCOUNTER
RECORDS RECEIVED FROM:    REASON FOR VISIT: Cognitive Dysfunction    Date of Appt: 12/13/2023   NOTES (FOR ALL VISITS) STATUS DETAILS   OFFICE NOTE from referring provider CAROLIN Emmanuel-6/9/2023   OFFICE NOTE from other specialist [facility]  ED Visit-7/20/2023   DISCHARGE SUMMARY from hospital     DISCHARGE REPORT from the ER     OPERATIVE REPORT     [facility] ([facility] ONLY)     EMG     EEG     MEDICATION LIST     IMAGING  (FOR ALL VISITS)     LUMBAR PUNCTURE     HALINA SCAN (MOVEMENT)     ULTRASOUND (CAROTID BILAT) *VASCULAR*     MRI (HEAD, NECK, SPINE) PACS  MR Brain-4/10/2023   CT (HEAD, NECK, SPINE)

## 2023-11-06 DIAGNOSIS — H91.93 DECREASED HEARING OF BOTH EARS: Primary | ICD-10-CM

## 2023-11-13 ENCOUNTER — TELEPHONE (OUTPATIENT)
Dept: OTOLARYNGOLOGY | Facility: OTHER | Age: 68
End: 2023-11-13

## 2023-11-13 ENCOUNTER — OFFICE VISIT (OUTPATIENT)
Dept: OTOLARYNGOLOGY | Facility: OTHER | Age: 68
End: 2023-11-13
Attending: AUDIOLOGIST
Payer: COMMERCIAL

## 2023-11-13 VITALS
HEIGHT: 63 IN | RESPIRATION RATE: 18 BRPM | DIASTOLIC BLOOD PRESSURE: 58 MMHG | BODY MASS INDEX: 26.75 KG/M2 | WEIGHT: 151 LBS | TEMPERATURE: 98.6 F | OXYGEN SATURATION: 96 % | HEART RATE: 94 BPM | SYSTOLIC BLOOD PRESSURE: 108 MMHG

## 2023-11-13 DIAGNOSIS — R09.82 POST-NASAL DRIP: Primary | ICD-10-CM

## 2023-11-13 DIAGNOSIS — L29.9 EAR ITCH: ICD-10-CM

## 2023-11-13 DIAGNOSIS — J34.89 RHINORRHEA: ICD-10-CM

## 2023-11-13 PROCEDURE — 99213 OFFICE O/P EST LOW 20 MIN: CPT | Performed by: NURSE PRACTITIONER

## 2023-11-13 PROCEDURE — G0463 HOSPITAL OUTPT CLINIC VISIT: HCPCS

## 2023-11-13 PROCEDURE — 86003 ALLG SPEC IGE CRUDE XTRC EA: CPT | Mod: ZL,GZ | Performed by: NURSE PRACTITIONER

## 2023-11-13 PROCEDURE — 36415 COLL VENOUS BLD VENIPUNCTURE: CPT | Mod: ZL | Performed by: NURSE PRACTITIONER

## 2023-11-13 PROCEDURE — 82785 ASSAY OF IGE: CPT | Mod: ZL | Performed by: NURSE PRACTITIONER

## 2023-11-13 ASSESSMENT — PAIN SCALES - GENERAL: PAINLEVEL: NO PAIN (0)

## 2023-11-13 NOTE — TELEPHONE ENCOUNTER
Tried to call pt to let her know that we do not need to see her for audiogram or ENT appointment today.  She already was seen on 09/13/2023.  No answer and no way to leave a message.

## 2023-11-13 NOTE — LETTER
11/13/2023         RE: Idalia Hoang  [address] [facility] [address]        Dear Colleague,    Thank you for referring your patient, Idalia Hoang, to the [facility]. Please see a copy of my visit note below.    Otolaryngology Note         Chief Complaint:     Patient presents with:  Sinus Problem: Post nasal drip  Ear Problem: Itching ears, feels like draining           History of Present Illness:     Idalia Hoang is a 67 year old female seen today for follow up ear check and concerns for post nasal drainage and rhinorrhea.     Idalia was last seen in ENT on 9/13/2023 for follow-up right myringotomy.  At that time she had been doing quite well.  The conductive hearing loss had resolved.  No tinnitus, ear fullness, vertigo, ear pain or drainage.  Repeat audiogram showed bilateral type a tympanograms, thresholds showed normal hearing right and left mild asymmetrical sensorineural hearing loss at 3 to 4000 Hz.    MRI brain was completed on 4/10/2023 with and without contrast    FINDINGS: There are some areas of white matter bright signal in both  cerebral hemispheres consistent with small vessel changes. The  ventricular system is normal in size. There are no enhancing masses or  ventricular shifts or extracerebral collections. The brainstem and  cerebellum appear normal. The pituitary and optic chiasm are normal.  The basal cisterns and internal auditory canals are normal. Cranial  vault is intact. Mucosal thickening is seen in the maxillary and  ethmoid sinuses.                                                               IMPRESSION: Small vessel changes in both hemispheres. No masses or  ventricular shifts    Today she has no concerns with her hearing, ears.  No complaints of aural fullness, acute changes in hearing, vertigo, bothersome tinnitus.  She has concerns for postnasal drainage and rhinorrhea and ear itching.  Symptoms have been present off and on for years.    Rigid  endoscopic exam completed on 8/1/2023 by myself showed grossly normal septum, moderately enlarged pale and boggy turbinates, unremarkable middle turbinates, unremarkable superior meatus, mucosal appears grossly normal throughout nasopharynx showed cobblestoning and clear postnasal drainage, no masses, eustachian tubes are patent.    She was started on Zyrtec and Flonase.  Also recommended NeilMed sinus rinse.    She feels she has a history of allergies.  Never been allergy tested in the past.  She is not currently using Flonase or any nasal rinses.  No concerns for facial pain or pressure or purulence.  No fevers or chills.  She has otherwise been feeling healthy.         Medications:     Current Outpatient Rx   Medication Sig Dispense Refill     estradiol (ESTRING) 7.5 MCG/24HR vaginal ring Place 1 each (1 Vaginal ring) vaginally every 3 months 1 each 4     fluticasone (FLONASE) 50 MCG/ACT nasal spray Spray 1 spray into both nostrils daily 18.2 mL 0     levothyroxine (SYNTHROID/LEVOTHROID) 88 MCG tablet Take 1 tablet by mouth once daily 90 tablet 2     methylphenidate (RITALIN) 10 MG tablet Take 1 tablet (10 mg) by mouth 2 times daily 60 tablet 0     pravastatin (PRAVACHOL) 20 MG tablet Take 1 tablet (20 mg) by mouth daily 30 tablet 3     pseudoePHEDrine (SUDAFED) 30 MG tablet Take 30 mg by mouth every 6 hours as needed for congestion       venlafaxine (EFFEXOR XR) 75 MG 24 hr capsule Take 1 capsule by mouth once daily 90 capsule 1     vitamin D3 (CHOLECALCIFEROL) 1.25 MG (61547 UT) capsule Take 1 capsule (50,000 Units) by mouth every 7 days 12 capsule 3     cetirizine (ZYRTEC) 10 MG tablet Take 1 tablet (10 mg) by mouth daily (Patient not taking: Reported on 11/13/2023) 30 tablet 1     rosuvastatin (CRESTOR) 10 MG tablet Take 1 tablet (10 mg) by mouth daily (Patient not taking: Reported on 11/13/2023) 90 tablet 1            Allergies:     Allergies: Seasonal allergies          Past Medical History:     Past  "Medical History:   Diagnosis Date     Acquired hypothyroidism 2016     Chronic serous otitis media, simple or unspecified 2012     Depression, recurrent 2011     Dysfunction of eustachian tube 2012     Estradiol deficiency 2016     Hypothyroidism 2011     Mixed hyperlipidemia 2016     Osteopenia             Past Surgical History:     Past Surgical History:   Procedure Laterality Date     COLONOSCOPY  2012     COLONOSCOPY N/A 2022    Procedure: COLONOSCOPY;  Surgeon: Eduardo Subramanian MD;  Location: [facility]     PHACOEMULSIFICATION WITH STANDARD INTRAOCULAR LENS IMPLANT Left 2022    Procedure: PHACOEMULSIFICATION CATARACT EXTRACTION POSTERIOR CHAMBER LENS LEFT EYE;  Surgeon: Tremayne Crawford MD;  Location: [facility] OR     surgical removal  2010    tendon nodule; free of disease       ENT family history reviewed         Social History:     Social History     Tobacco Use     Smoking status: Former     Packs/day: 0.30     Years: 1.00     Additional pack years: 0.00     Total pack years: 0.30     Types: Cigarettes     Quit date:      Years since quittin.8     Passive exposure: Past     Smokeless tobacco: Never     Tobacco comments:     quit ; no passive exposure   Vaping Use     Vaping Use: Never used   Substance Use Topics     Alcohol use: Yes     Comment: 1 drink beer and wine daily     Drug use: No            Review of Systems:     ROS: See HPI         Physical Exam:     /58 (BP Location: Right arm, Patient Position: Sitting, Cuff Size: Adult Regular)   Pulse 94   Temp 98.6  F (37  C) (Tympanic)   Resp 18   Ht 1.595 m (5' 2.8\")   Wt 68.5 kg (151 lb)   SpO2 96%   BMI 26.92 kg/m      General - The patient is well nourished and well developed, and appears to have good nutritional status.  Alert and interactive.  Head and Face - Normocephalic and atraumatic, with no gross asymmetry noted.  The facial nerve is intact, with strong symmetric " movements.  Voice and Breathing - The patient was breathing comfortably without the use of accessory muscles. There was no wheezing, stridor, or stertor.  The patients voice was clear and strong, and had appropriate pitch and quality.  Ears -The external auditory canals are patent, the tympanic membranes are intact without effusion, retraction or mass.  Bony landmarks are intact.  Eyes - Extraocular movements intact, sclera were not icteric or injected, conjunctiva were pink and moist.  Mouth - Examination of the oral cavity showed pink, healthy oral mucosa. No lesions or ulcerations noted.  The tongue was mobile and midline, and the dentition were in good repair.    Throat - The walls of the oropharynx were smooth, pink, moist, symmetric, and had no lesions or ulcerations.  The tonsillar pillars and soft palate were symmetric.  The uvula was midline on elevation.    Neck -no worrisome lymphadenopathy.   Palpation of the thyroid was soft and smooth, with no nodules or goiter appreciated.  The trachea was mobile and midline.  Nose - External contour is symmetric, no gross deflection or scars.  The nasal passages are examined with nasal speculum.   Nasal mucosa is pink and moist with no abnormal mucus.  The septum was intact and the turbinates are examined with nasal speculum, no polyps, masses, or purulence noted on examination of anterior nasal cavity.          Assessment and Plan:       ICD-10-CM    1. Post-nasal drip  R09.82 Inhalent Panel [place] (Serolab)     Total IgE (Serolab)     Inhalent Panel [place] (Serolab)     Total IgE (Serolab)      2. Ear itch  L29.9 Inhalent Panel [place] (Serolab)     Total IgE (Serolab)     Inhalent Panel [place] (Serolab)     Total IgE (Serolab)      3. Rhinorrhea  J34.89 Inhalent Panel [place] (Serolab)     Total IgE (Serolab)     Inhalent Panel [place] (Serolab)     Total IgE (Serolab)          Complete allergy testing via blood panel  Start flonase  Start zyrtec  (or generic equivalent)  We will call you with the results of the allergy test    Indications for allergy testing include:    1) Confirm suspicion of allergic rhinitis due to inhalant allergies  2) Identify the offending allergen to determine specific mode of treatment  3) In the case of chronic rhinosinusitis: when symptoms are not controlled by avoidance and pharmacotherapy  4) In the Asthma patient when exacerbations may be due to perennial allergen exposure  5) Suspect food allergy  6) Otitis Media, chronic rhinitis, atopic dermatitis, Meniere disease, headache, pharyngitis or eye symptoms      Modified quantitative testing (MQT) will be performed.  Signed consent was obtained, and the risks of immunotherapy were discussed, including the potential for anaphylaxis.     If immunotherapy (IT) is recommended, there is continued risk of anaphylaxis.   Anaphylaxis can cause death. The patient will need to be monitored for 30 minutes post injection.  They must present their epinephrine pen prior to injection.  Subcutaneous as well as sublingual immunotherapy (SLIT) were discussed as potential treatment options.  The patient was told SLIT is not approved by the FDA and is cash pay.  The general time frame of immunotherapy was discussed (generally 3-5 years, sometimes longer), and the basic immunology behind IT was discussed.      Nayely WHITT  [facility]        Again, thank you for allowing me to participate in the care of your patient.        Sincerely,        Nayely Jiang NP

## 2023-11-13 NOTE — PROGRESS NOTES
Otolaryngology Note         Chief Complaint:     Patient presents with:  Sinus Problem: Post nasal drip  Ear Problem: Itching ears, feels like draining           History of Present Illness:     Idalia Hoang is a 67 year old female seen today for follow up ear check and concerns for post nasal drainage and rhinorrhea.     Idalia was last seen in ENT on 9/13/2023 for follow-up right myringotomy.  At that time she had been doing quite well.  The conductive hearing loss had resolved.  No tinnitus, ear fullness, vertigo, ear pain or drainage.  Repeat audiogram showed bilateral type a tympanograms, thresholds showed normal hearing right and left mild asymmetrical sensorineural hearing loss at 3 to 4000 Hz.    MRI brain was completed on 4/10/2023 with and without contrast    FINDINGS: There are some areas of white matter bright signal in both  cerebral hemispheres consistent with small vessel changes. The  ventricular system is normal in size. There are no enhancing masses or  ventricular shifts or extracerebral collections. The brainstem and  cerebellum appear normal. The pituitary and optic chiasm are normal.  The basal cisterns and internal auditory canals are normal. Cranial  vault is intact. Mucosal thickening is seen in the maxillary and  ethmoid sinuses.                                                               IMPRESSION: Small vessel changes in both hemispheres. No masses or  ventricular shifts    Today she has no concerns with her hearing, ears.  No complaints of aural fullness, acute changes in hearing, vertigo, bothersome tinnitus.  She has concerns for postnasal drainage and rhinorrhea and ear itching.  Symptoms have been present off and on for years.    Rigid endoscopic exam completed on 8/1/2023 by myself showed grossly normal septum, moderately enlarged pale and boggy turbinates, unremarkable middle turbinates, unremarkable superior meatus, mucosal appears grossly normal throughout nasopharynx showed  cobblestoning and clear postnasal drainage, no masses, eustachian tubes are patent.    She was started on Zyrtec and Flonase.  Also recommended NeilMed sinus rinse.    She feels she has a history of allergies.  Never been allergy tested in the past.  She is not currently using Flonase or any nasal rinses.  No concerns for facial pain or pressure or purulence.  No fevers or chills.  She has otherwise been feeling healthy.         Medications:     Current Outpatient Rx   Medication Sig Dispense Refill    estradiol (ESTRING) 7.5 MCG/24HR vaginal ring Place 1 each (1 Vaginal ring) vaginally every 3 months 1 each 4    fluticasone (FLONASE) 50 MCG/ACT nasal spray Spray 1 spray into both nostrils daily 18.2 mL 0    levothyroxine (SYNTHROID/LEVOTHROID) 88 MCG tablet Take 1 tablet by mouth once daily 90 tablet 2    methylphenidate (RITALIN) 10 MG tablet Take 1 tablet (10 mg) by mouth 2 times daily 60 tablet 0    pravastatin (PRAVACHOL) 20 MG tablet Take 1 tablet (20 mg) by mouth daily 30 tablet 3    pseudoePHEDrine (SUDAFED) 30 MG tablet Take 30 mg by mouth every 6 hours as needed for congestion      venlafaxine (EFFEXOR XR) 75 MG 24 hr capsule Take 1 capsule by mouth once daily 90 capsule 1    vitamin D3 (CHOLECALCIFEROL) 1.25 MG (41394 UT) capsule Take 1 capsule (50,000 Units) by mouth every 7 days 12 capsule 3    cetirizine (ZYRTEC) 10 MG tablet Take 1 tablet (10 mg) by mouth daily (Patient not taking: Reported on 11/13/2023) 30 tablet 1    rosuvastatin (CRESTOR) 10 MG tablet Take 1 tablet (10 mg) by mouth daily (Patient not taking: Reported on 11/13/2023) 90 tablet 1            Allergies:     Allergies: Seasonal allergies          Past Medical History:     Past Medical History:   Diagnosis Date    Acquired hypothyroidism 4/25/2016    Chronic serous otitis media, simple or unspecified 06/18/2012    Depression, recurrent 01/01/2011    Dysfunction of eustachian tube 06/18/2012    Estradiol deficiency 4/25/2016     "Hypothyroidism 2011    Mixed hyperlipidemia 2016    Osteopenia             Past Surgical History:     Past Surgical History:   Procedure Laterality Date    COLONOSCOPY  2012    COLONOSCOPY N/A 2022    Procedure: COLONOSCOPY;  Surgeon: Eduardo Subramanian MD;  Location: [facility]    PHACOEMULSIFICATION WITH STANDARD INTRAOCULAR LENS IMPLANT Left 2022    Procedure: PHACOEMULSIFICATION CATARACT EXTRACTION POSTERIOR CHAMBER LENS LEFT EYE;  Surgeon: Tremayne Crawford MD;  Location: [facility] OR    surgical removal      tendon nodule; free of disease       ENT family history reviewed         Social History:     Social History     Tobacco Use    Smoking status: Former     Packs/day: 0.30     Years: 1.00     Additional pack years: 0.00     Total pack years: 0.30     Types: Cigarettes     Quit date:      Years since quittin.8     Passive exposure: Past    Smokeless tobacco: Never    Tobacco comments:     quit ; no passive exposure   Vaping Use    Vaping Use: Never used   Substance Use Topics    Alcohol use: Yes     Comment: 1 drink beer and wine daily    Drug use: No            Review of Systems:     ROS: See HPI         Physical Exam:     /58 (BP Location: Right arm, Patient Position: Sitting, Cuff Size: Adult Regular)   Pulse 94   Temp 98.6  F (37  C) (Tympanic)   Resp 18   Ht 1.595 m (5' 2.8\")   Wt 68.5 kg (151 lb)   SpO2 96%   BMI 26.92 kg/m      General - The patient is well nourished and well developed, and appears to have good nutritional status.  Alert and interactive.  Head and Face - Normocephalic and atraumatic, with no gross asymmetry noted.  The facial nerve is intact, with strong symmetric movements.  Voice and Breathing - The patient was breathing comfortably without the use of accessory muscles. There was no wheezing, stridor, or stertor.  The patients voice was clear and strong, and had appropriate pitch and quality.  Ears -The external auditory canals are " patent, the tympanic membranes are intact without effusion, retraction or mass.  Bony landmarks are intact.  Eyes - Extraocular movements intact, sclera were not icteric or injected, conjunctiva were pink and moist.  Mouth - Examination of the oral cavity showed pink, healthy oral mucosa. No lesions or ulcerations noted.  The tongue was mobile and midline, and the dentition were in good repair.    Throat - The walls of the oropharynx were smooth, pink, moist, symmetric, and had no lesions or ulcerations.  The tonsillar pillars and soft palate were symmetric.  The uvula was midline on elevation.    Neck -no worrisome lymphadenopathy.   Palpation of the thyroid was soft and smooth, with no nodules or goiter appreciated.  The trachea was mobile and midline.  Nose - External contour is symmetric, no gross deflection or scars.  The nasal passages are examined with nasal speculum.   Nasal mucosa is pink and moist with no abnormal mucus.  The septum was intact and the turbinates are examined with nasal speculum, no polyps, masses, or purulence noted on examination of anterior nasal cavity.          Assessment and Plan:       ICD-10-CM    1. Post-nasal drip  R09.82 Inhalent Panel [place] (Serolab)     Total IgE (Serolab)     Inhalent Panel [place] (Serolab)     Total IgE (Serolab)      2. Ear itch  L29.9 Inhalent Panel [place] (Serolab)     Total IgE (Serolab)     Inhalent Panel [place] (Serolab)     Total IgE (Serolab)      3. Rhinorrhea  J34.89 Inhalent Panel [place] (Serolab)     Total IgE (Serolab)     Inhalent Panel [place] (Serolab)     Total IgE (Serolab)          Complete allergy testing via blood panel  Start flonase  Start zyrtec (or generic equivalent)  We will call you with the results of the allergy test    Indications for allergy testing include:    1) Confirm suspicion of allergic rhinitis due to inhalant allergies  2) Identify the offending allergen to determine specific mode of treatment  3) In  the case of chronic rhinosinusitis: when symptoms are not controlled by avoidance and pharmacotherapy  4) In the Asthma patient when exacerbations may be due to perennial allergen exposure  5) Suspect food allergy  6) Otitis Media, chronic rhinitis, atopic dermatitis, Meniere disease, headache, pharyngitis or eye symptoms      Modified quantitative testing (MQT) will be performed.  Signed consent was obtained, and the risks of immunotherapy were discussed, including the potential for anaphylaxis.     If immunotherapy (IT) is recommended, there is continued risk of anaphylaxis.   Anaphylaxis can cause death. The patient will need to be monitored for 30 minutes post injection.  They must present their epinephrine pen prior to injection.  Subcutaneous as well as sublingual immunotherapy (SLIT) were discussed as potential treatment options.  The patient was told SLIT is not approved by the FDA and is cash pay.  The general time frame of immunotherapy was discussed (generally 3-5 years, sometimes longer), and the basic immunology behind IT was discussed.      Nayely WHITT  [facility]

## 2023-11-20 ENCOUNTER — PATIENT OUTREACH (OUTPATIENT)
Dept: GASTROENTEROLOGY | Facility: CLINIC | Age: 68
End: 2023-11-20
Payer: COMMERCIAL

## 2023-11-22 LAB
SCANNED LAB RESULT: NORMAL
SCANNED LAB RESULT: NORMAL

## 2023-11-30 NOTE — PROGRESS NOTES
"  Assessment & Plan     Hypersomnolence disorder, persistent  She is going to increase still sleeping on the couch.   - methylphenidate (RITALIN) 20 MG tablet; Take 1 tablet (20 mg) by mouth 2 times daily    Acquired hypothyroidism  She Is going to have this checked.  Feeling very tire. No weight loss sweats weakness.   - TSH with free T4 reflex; Future    Mixed hyperlipidemia  Due for recheck taking medications.   - Lipid Profile (Chol, Trig, HDL, LDL calc); Future    Vitamin B12 deficiency disease  Hasn't been taking her medications.   - Vitamin B12; Future  - Cyanocobalamin (B-12 COMPLIANCE INJECTION) 1000 MCG/ML KIT; Inject 1 Dose as directed every 30 days    History of hepatitis C  I wasn't aware of this. We are going to be getting labs and also Hep C antigen   - Comprehensive metabolic panel (BMP + Alb, Alk Phos, ALT, AST, Total. Bili, TP); Future    Ordering of each unique test  10  minutes spent by me on the date of the encounter doing chart review, history and exam, documentation and further activities per the note       BMI:   Estimated body mass index is 25.25 kg/m  as calculated from the following:    Height as of this encounter: 1.665 m (5' 5.55\").    Weight as of this encounter: 70 kg (154 lb 4.8 oz).   Weight management plan: Discussed healthy diet and exercise guidelines        No follow-ups on file.    MARCOS John  [facility] - BRENT Friedman is a 67 year old, presenting for the following health issues:  Hypertension        12/1/2023     8:03 AM   Additional Questions   Roomed by orlando fried   Accompanied by self       HPI     Hypertension Follow-up    Do you check your blood pressure regularly outside of the clinic? No   Are you following a low salt diet? No  Are your blood pressures ever more than 140 on the top number (systolic) OR more   than 90 on the bottom number (diastolic), for example 140/90? No- today was     Medication Followup of ritalin   Taking " No stool x1 week, +urination. No relief with prunes and purne juice. Denies change to diet.  Pt irritable and crying throughout triage assessment "Medication as prescribed: taking the half, sometimes forgetting to take the other half   Side Effects:  None  Medication Helping Symptoms: unknown       Review of Systems   Constitutional, HEENT, cardiovascular, pulmonary, gi and gu systems are negative, except as otherwise noted.      Objective    BP (!) 143/91 (BP Location: Right arm, Patient Position: Sitting, Cuff Size: Adult Regular)   Pulse 78   Temp 98  F (36.7  C) (Tympanic)   Ht 1.665 m (5' 5.55\")   Wt 70 kg (154 lb 4.8 oz)   SpO2 97%   BMI 25.25 kg/m    Body mass index is 25.25 kg/m .  Physical Exam   GENERAL: healthy, alert and no distress  EYES: Eyes grossly normal to inspection, PERRL and conjunctivae and sclerae normal  HENT: ear canals and TM's normal, nose and mouth without ulcers or lesions  NECK: no adenopathy, no asymmetry, masses, or scars and thyroid normal to palpation  RESP: lungs clear to auscultation - no rales, rhonchi or wheezes  CV: regular rate and rhythm, normal S1 S2, no S3 or S4, no murmur, click or rub, no peripheral edema and peripheral pulses strong  ABDOMEN: soft, nontender, no hepatosplenomegaly, no masses and bowel sounds normal  MS: no gross musculoskeletal defects noted, no edema  SKIN: no suspicious lesions or rashes  NEURO: Normal strength and tone, mentation intact and speech normal  BACK: no CVA tenderness, no paralumbar tenderness  PSYCH: mentation appears normal, affect normal/bright  LYMPH: no cervical, supraclavicular, axillary, or inguinal adenopathy    No results found for any visits on 12/01/23.          Answers submitted by the patient for this visit:  Patient Health Questionnaire (Submitted on 12/1/2023)  If you checked off any problems, how difficult have these problems made it for you to do your work, take care of things at home, or get along with other people?: Somewhat difficult  PHQ9 TOTAL SCORE: 8  BRADLY-7 (Submitted on 12/1/2023)  BRADLY 7 TOTAL SCORE: 0    "

## 2023-12-01 ENCOUNTER — OFFICE VISIT (OUTPATIENT)
Dept: FAMILY MEDICINE | Facility: OTHER | Age: 68
End: 2023-12-01
Attending: PHYSICIAN ASSISTANT
Payer: COMMERCIAL

## 2023-12-01 VITALS
SYSTOLIC BLOOD PRESSURE: 143 MMHG | HEIGHT: 66 IN | OXYGEN SATURATION: 97 % | HEART RATE: 78 BPM | TEMPERATURE: 98 F | DIASTOLIC BLOOD PRESSURE: 91 MMHG | WEIGHT: 154.3 LBS | BODY MASS INDEX: 24.8 KG/M2

## 2023-12-01 DIAGNOSIS — G47.10 HYPERSOMNOLENCE DISORDER, PERSISTENT: Primary | ICD-10-CM

## 2023-12-01 DIAGNOSIS — E53.8 VITAMIN B12 DEFICIENCY DISEASE: ICD-10-CM

## 2023-12-01 DIAGNOSIS — Z86.19 HISTORY OF HEPATITIS C: ICD-10-CM

## 2023-12-01 DIAGNOSIS — E78.2 MIXED HYPERLIPIDEMIA: ICD-10-CM

## 2023-12-01 DIAGNOSIS — E78.5 HYPERLIPIDEMIA LDL GOAL <100: ICD-10-CM

## 2023-12-01 DIAGNOSIS — E03.9 ACQUIRED HYPOTHYROIDISM: ICD-10-CM

## 2023-12-01 LAB
ALBUMIN SERPL BCG-MCNC: 4.8 G/DL (ref 3.5–5.2)
ALP SERPL-CCNC: 71 U/L (ref 40–150)
ALT SERPL W P-5'-P-CCNC: 16 U/L (ref 0–50)
ANION GAP SERPL CALCULATED.3IONS-SCNC: 12 MMOL/L (ref 7–15)
AST SERPL W P-5'-P-CCNC: 18 U/L (ref 0–45)
BILIRUB SERPL-MCNC: 0.3 MG/DL
BUN SERPL-MCNC: 14.7 MG/DL (ref 8–23)
CALCIUM SERPL-MCNC: 10.1 MG/DL (ref 8.8–10.2)
CHLORIDE SERPL-SCNC: 104 MMOL/L (ref 98–107)
CHOLEST SERPL-MCNC: 252 MG/DL
CK SERPL-CCNC: 80 U/L (ref 26–192)
CREAT SERPL-MCNC: 0.83 MG/DL (ref 0.51–0.95)
DEPRECATED HCO3 PLAS-SCNC: 26 MMOL/L (ref 22–29)
EGFRCR SERPLBLD CKD-EPI 2021: 77 ML/MIN/1.73M2
GLUCOSE SERPL-MCNC: 112 MG/DL (ref 70–99)
HDLC SERPL-MCNC: 69 MG/DL
LDLC SERPL CALC-MCNC: 149 MG/DL
NONHDLC SERPL-MCNC: 183 MG/DL
POTASSIUM SERPL-SCNC: 3.8 MMOL/L (ref 3.4–5.3)
PROT SERPL-MCNC: 7.7 G/DL (ref 6.4–8.3)
SODIUM SERPL-SCNC: 142 MMOL/L (ref 135–145)
TRIGL SERPL-MCNC: 169 MG/DL
TSH SERPL DL<=0.005 MIU/L-ACNC: 2.75 UIU/ML (ref 0.3–4.2)

## 2023-12-01 PROCEDURE — 99214 OFFICE O/P EST MOD 30 MIN: CPT | Performed by: PHYSICIAN ASSISTANT

## 2023-12-01 PROCEDURE — 80053 COMPREHEN METABOLIC PANEL: CPT | Mod: ZL | Performed by: PHYSICIAN ASSISTANT

## 2023-12-01 PROCEDURE — 82550 ASSAY OF CK (CPK): CPT | Mod: ZL | Performed by: PHYSICIAN ASSISTANT

## 2023-12-01 PROCEDURE — G0463 HOSPITAL OUTPT CLINIC VISIT: HCPCS

## 2023-12-01 PROCEDURE — 36415 COLL VENOUS BLD VENIPUNCTURE: CPT | Mod: ZL | Performed by: PHYSICIAN ASSISTANT

## 2023-12-01 PROCEDURE — 84443 ASSAY THYROID STIM HORMONE: CPT | Mod: ZL | Performed by: PHYSICIAN ASSISTANT

## 2023-12-01 PROCEDURE — 82607 VITAMIN B-12: CPT | Mod: ZL | Performed by: PHYSICIAN ASSISTANT

## 2023-12-01 PROCEDURE — 80061 LIPID PANEL: CPT | Mod: ZL | Performed by: PHYSICIAN ASSISTANT

## 2023-12-01 RX ORDER — METHYLPHENIDATE HYDROCHLORIDE 20 MG/1
20 TABLET ORAL 2 TIMES DAILY
Qty: 60 TABLET | Refills: 0 | Status: SHIPPED | OUTPATIENT
Start: 2023-12-01 | End: 2024-05-03

## 2023-12-01 RX ORDER — CYANOCOBALAMIN, ISOPROPYL ALCOHOL 1000MCG/ML
1 KIT INJECTION
Qty: 3 KIT | Refills: 4 | Status: SHIPPED | OUTPATIENT
Start: 2023-12-01

## 2023-12-01 ASSESSMENT — ANXIETY QUESTIONNAIRES
GAD7 TOTAL SCORE: 0
GAD7 TOTAL SCORE: 0
4. TROUBLE RELAXING: NOT AT ALL
7. FEELING AFRAID AS IF SOMETHING AWFUL MIGHT HAPPEN: NOT AT ALL
2. NOT BEING ABLE TO STOP OR CONTROL WORRYING: NOT AT ALL
5. BEING SO RESTLESS THAT IT IS HARD TO SIT STILL: NOT AT ALL
8. IF YOU CHECKED OFF ANY PROBLEMS, HOW DIFFICULT HAVE THESE MADE IT FOR YOU TO DO YOUR WORK, TAKE CARE OF THINGS AT HOME, OR GET ALONG WITH OTHER PEOPLE?: SOMEWHAT DIFFICULT
1. FEELING NERVOUS, ANXIOUS, OR ON EDGE: NOT AT ALL
6. BECOMING EASILY ANNOYED OR IRRITABLE: NOT AT ALL
3. WORRYING TOO MUCH ABOUT DIFFERENT THINGS: NOT AT ALL
IF YOU CHECKED OFF ANY PROBLEMS ON THIS QUESTIONNAIRE, HOW DIFFICULT HAVE THESE PROBLEMS MADE IT FOR YOU TO DO YOUR WORK, TAKE CARE OF THINGS AT HOME, OR GET ALONG WITH OTHER PEOPLE: SOMEWHAT DIFFICULT
7. FEELING AFRAID AS IF SOMETHING AWFUL MIGHT HAPPEN: NOT AT ALL
GAD7 TOTAL SCORE: 0

## 2023-12-01 ASSESSMENT — PAIN SCALES - GENERAL: PAINLEVEL: NO PAIN (0)

## 2023-12-01 ASSESSMENT — PATIENT HEALTH QUESTIONNAIRE - PHQ9
SUM OF ALL RESPONSES TO PHQ QUESTIONS 1-9: 8
SUM OF ALL RESPONSES TO PHQ QUESTIONS 1-9: 8
10. IF YOU CHECKED OFF ANY PROBLEMS, HOW DIFFICULT HAVE THESE PROBLEMS MADE IT FOR YOU TO DO YOUR WORK, TAKE CARE OF THINGS AT HOME, OR GET ALONG WITH OTHER PEOPLE: SOMEWHAT DIFFICULT

## 2023-12-02 LAB — VIT B12 SERPL-MCNC: 555 PG/ML (ref 232–1245)

## 2023-12-09 ENCOUNTER — MYC MEDICAL ADVICE (OUTPATIENT)
Dept: FAMILY MEDICINE | Facility: OTHER | Age: 68
End: 2023-12-09

## 2023-12-13 ENCOUNTER — PRE VISIT (OUTPATIENT)
Dept: NEUROLOGY | Facility: CLINIC | Age: 68
End: 2023-12-13
Payer: COMMERCIAL

## 2024-01-08 ENCOUNTER — ALLIED HEALTH/NURSE VISIT (OUTPATIENT)
Dept: FAMILY MEDICINE | Facility: OTHER | Age: 69
End: 2024-01-08
Attending: PHYSICIAN ASSISTANT
Payer: COMMERCIAL

## 2024-01-08 DIAGNOSIS — E53.8 VITAMIN B12 DEFICIENCY (NON ANEMIC): Primary | ICD-10-CM

## 2024-01-08 PROCEDURE — 96372 THER/PROPH/DIAG INJ SC/IM: CPT | Performed by: PHYSICIAN ASSISTANT

## 2024-01-08 PROCEDURE — 250N000011 HC RX IP 250 OP 636: Performed by: PHYSICIAN ASSISTANT

## 2024-01-08 RX ADMIN — CYANOCOBALAMIN 1000 MCG: 1000 INJECTION, SOLUTION INTRAMUSCULAR; SUBCUTANEOUS at 13:19

## 2024-01-10 ENCOUNTER — OFFICE VISIT (OUTPATIENT)
Dept: FAMILY MEDICINE | Facility: OTHER | Age: 69
End: 2024-01-10
Attending: PHYSICIAN ASSISTANT
Payer: COMMERCIAL

## 2024-01-10 VITALS
OXYGEN SATURATION: 97 % | TEMPERATURE: 98.5 F | BODY MASS INDEX: 24.87 KG/M2 | SYSTOLIC BLOOD PRESSURE: 108 MMHG | DIASTOLIC BLOOD PRESSURE: 62 MMHG | RESPIRATION RATE: 16 BRPM | WEIGHT: 152 LBS | HEART RATE: 86 BPM

## 2024-01-10 DIAGNOSIS — E78.2 MIXED HYPERLIPIDEMIA: Primary | ICD-10-CM

## 2024-01-10 DIAGNOSIS — E03.9 ACQUIRED HYPOTHYROIDISM: ICD-10-CM

## 2024-01-10 DIAGNOSIS — F33.9 EPISODE OF RECURRENT MAJOR DEPRESSIVE DISORDER, UNSPECIFIED DEPRESSION EPISODE SEVERITY (H): ICD-10-CM

## 2024-01-10 PROCEDURE — 99213 OFFICE O/P EST LOW 20 MIN: CPT | Performed by: PHYSICIAN ASSISTANT

## 2024-01-10 PROCEDURE — G0463 HOSPITAL OUTPT CLINIC VISIT: HCPCS

## 2024-01-10 RX ORDER — VENLAFAXINE HYDROCHLORIDE 75 MG/1
CAPSULE, EXTENDED RELEASE ORAL
Qty: 90 CAPSULE | Refills: 1 | Status: CANCELLED | OUTPATIENT
Start: 2024-01-10

## 2024-01-10 RX ORDER — VENLAFAXINE HYDROCHLORIDE 150 MG/1
150 CAPSULE, EXTENDED RELEASE ORAL DAILY
Qty: 90 CAPSULE | Refills: 1 | Status: SHIPPED | OUTPATIENT
Start: 2024-01-10 | End: 2024-09-30

## 2024-01-10 RX ORDER — PRAVASTATIN SODIUM 40 MG
40 TABLET ORAL DAILY
Qty: 90 TABLET | Refills: 1 | Status: SHIPPED | OUTPATIENT
Start: 2024-01-10 | End: 2024-05-03

## 2024-01-10 RX ORDER — CYANOCOBALAMIN 1000 UG/ML
INJECTION, SOLUTION INTRAMUSCULAR; SUBCUTANEOUS
COMMUNITY
Start: 2023-12-01 | End: 2024-05-03

## 2024-01-10 ASSESSMENT — PAIN SCALES - GENERAL: PAINLEVEL: NO PAIN (0)

## 2024-01-10 ASSESSMENT — PATIENT HEALTH QUESTIONNAIRE - PHQ9: SUM OF ALL RESPONSES TO PHQ QUESTIONS 1-9: 0

## 2024-01-10 NOTE — PROGRESS NOTES
Assessment & Plan     Episode of recurrent major depressive disorder, unspecified depression episode severity (H24)  She will increase her Effexor. She feels the higher dose is very helpful.  Was having break through panic.  Not able to get Ritalin.  She is following up also with her Neuro [facility] appointment.  Can't remember things. Hard to retrieve. Moderate Risk for Dementia.  She also has issues with long term memory and Short term memory given many things to help her with this. We will make sure her depression is managed well. See us back in 3 months.   - venlafaxine (EFFEXOR XR) 150 MG 24 hr capsule; Take 1 capsule (150 mg) by mouth daily    Mixed hyperlipidemia  She is going to have this increased.   - Lipid Profile (Chol, Trig, HDL, LDL calc); Future  - pravastatin (PRAVACHOL) 40 MG tablet; Take 1 tablet (40 mg) by mouth daily    Acquired hypothyroidism  TSH is in range ok for one year.      Review of external notes as documented elsewhere in note  Ordering of each unique test  Prescription drug management  10  minutes spent by me on the date of the encounter doing chart review, history and exam, documentation and further activities per the note       See Patient Instructions    No follow-ups on file.    MARCOS John  [facility] - BRENT Friedman is a 68 year old, presenting for the following health issues:  Follow Up        1/10/2024     8:10 AM   Additional Questions   Roomed by Martin Kamara LPN   Accompanied by self         1/10/2024     8:10 AM   Patient Reported Additional Medications   Patient reports taking the following new medications none       HPI     Hyperlipidemia Follow-Up    Are you regularly taking any medication or supplement to lower your cholesterol?   Yes- pravastatin   Are you having muscle aches or other side effects that you think could be caused by your cholesterol lowering medication?  No    Hypothyroidism Follow-up    Since last visit, patient  describes the following symptoms: Weight stable, no hair loss, no skin changes, no constipation, no loose stools          Review of Systems   Constitutional, HEENT, cardiovascular, pulmonary, gi and gu systems are negative, except as otherwise noted.      Objective    /80 (BP Location: Left arm, Patient Position: Sitting, Cuff Size: Adult Regular)   Pulse 86   Temp 98.5  F (36.9  C) (Tympanic)   Resp 16   Wt 68.9 kg (152 lb)   SpO2 97%   BMI 24.87 kg/m    Body mass index is 24.87 kg/m .  Physical Exam   GENERAL: healthy, alert and no distress  EYES: Eyes grossly normal to inspection, PERRL and conjunctivae and sclerae normal  HENT: ear canals and TM's normal, nose and mouth without ulcers or lesions  NECK: no adenopathy, no asymmetry, masses, or scars and thyroid normal to palpation  RESP: lungs clear to auscultation - no rales, rhonchi or wheezes  CV: regular rate and rhythm, normal S1 S2, no S3 or S4, no murmur, click or rub, no peripheral edema and peripheral pulses strong  ABDOMEN: soft, nontender, no hepatosplenomegaly, no masses and bowel sounds normal  MS: no gross musculoskeletal defects noted, no edema    No results found for any visits on 01/10/24.

## 2024-01-16 ENCOUNTER — LAB (OUTPATIENT)
Dept: LAB | Facility: OTHER | Age: 69
End: 2024-01-16
Payer: COMMERCIAL

## 2024-01-16 DIAGNOSIS — E78.2 MIXED HYPERLIPIDEMIA: ICD-10-CM

## 2024-01-16 LAB
CHOLEST SERPL-MCNC: 280 MG/DL
FASTING STATUS PATIENT QL REPORTED: YES
HDLC SERPL-MCNC: 70 MG/DL
LDLC SERPL CALC-MCNC: 180 MG/DL
NONHDLC SERPL-MCNC: 210 MG/DL
TRIGL SERPL-MCNC: 151 MG/DL

## 2024-01-16 PROCEDURE — 80061 LIPID PANEL: CPT | Mod: ZL

## 2024-01-16 PROCEDURE — 36415 COLL VENOUS BLD VENIPUNCTURE: CPT | Mod: ZL

## 2024-01-17 ENCOUNTER — TELEPHONE (OUTPATIENT)
Dept: FAMILY MEDICINE | Facility: OTHER | Age: 69
End: 2024-01-17

## 2024-01-31 ENCOUNTER — LAB (OUTPATIENT)
Dept: LAB | Facility: OTHER | Age: 69
End: 2024-01-31
Payer: COMMERCIAL

## 2024-01-31 DIAGNOSIS — E78.2 MIXED HYPERLIPIDEMIA: ICD-10-CM

## 2024-01-31 LAB — HOLD SPECIMEN: NORMAL

## 2024-01-31 PROCEDURE — 82465 ASSAY BLD/SERUM CHOLESTEROL: CPT | Mod: ZL

## 2024-01-31 PROCEDURE — 36415 COLL VENOUS BLD VENIPUNCTURE: CPT | Mod: ZL

## 2024-02-01 DIAGNOSIS — E78.2 MIXED HYPERLIPIDEMIA: Primary | ICD-10-CM

## 2024-02-02 LAB
CHOLEST SERPL-MCNC: 219 MG/DL
HDLC SERPL-MCNC: 64 MG/DL
LDLC SERPL CALC-MCNC: 133 MG/DL
NONHDLC SERPL-MCNC: 155 MG/DL
TRIGL SERPL-MCNC: 111 MG/DL

## 2024-03-28 DIAGNOSIS — F33.9 EPISODE OF RECURRENT MAJOR DEPRESSIVE DISORDER, UNSPECIFIED DEPRESSION EPISODE SEVERITY (H): ICD-10-CM

## 2024-03-28 DIAGNOSIS — E03.9 ACQUIRED HYPOTHYROIDISM: ICD-10-CM

## 2024-03-28 RX ORDER — LEVOTHYROXINE SODIUM 88 UG/1
TABLET ORAL
Qty: 90 TABLET | Refills: 2 | Status: SHIPPED | OUTPATIENT
Start: 2024-03-28

## 2024-03-28 RX ORDER — VENLAFAXINE HYDROCHLORIDE 75 MG/1
CAPSULE, EXTENDED RELEASE ORAL
Qty: 90 CAPSULE | Refills: 0 | OUTPATIENT
Start: 2024-03-28

## 2024-03-28 NOTE — TELEPHONE ENCOUNTER
Effexor adjusted 1/10/24 to 150 mg one capsule daily #90, 1 R.  Medication denied. Patient to follow up in April and scheduled.  Adele Rashid, JIMMIE  Care Coordination

## 2024-03-28 NOTE — TELEPHONE ENCOUNTER
Levothyroxine (Synthroid/Levothroid) 88 MCG tablet    Last Written Prescription Date:  06/15/2023  Last Fill Quantity: 90,   # refills: 0  Last Office Visit: 01/10/2024      Venlafaxine (Effexor XR) 75 MG 24 hr capsule    Last Written Prescription Date:  01/10/2024  Last Fill Quantity: 90,   # refills: 0

## 2024-05-03 ENCOUNTER — OFFICE VISIT (OUTPATIENT)
Dept: FAMILY MEDICINE | Facility: OTHER | Age: 69
End: 2024-05-03
Attending: PHYSICIAN ASSISTANT
Payer: COMMERCIAL

## 2024-05-03 VITALS
DIASTOLIC BLOOD PRESSURE: 78 MMHG | SYSTOLIC BLOOD PRESSURE: 114 MMHG | OXYGEN SATURATION: 97 % | TEMPERATURE: 98.4 F | RESPIRATION RATE: 16 BRPM | HEART RATE: 74 BPM | WEIGHT: 150.8 LBS | BODY MASS INDEX: 24.67 KG/M2

## 2024-05-03 DIAGNOSIS — G31.84 MCI (MILD COGNITIVE IMPAIRMENT): ICD-10-CM

## 2024-05-03 DIAGNOSIS — E55.9 VITAMIN D DEFICIENCY: ICD-10-CM

## 2024-05-03 DIAGNOSIS — Z00.00 MEDICARE ANNUAL WELLNESS VISIT, SUBSEQUENT: ICD-10-CM

## 2024-05-03 DIAGNOSIS — E03.9 ACQUIRED HYPOTHYROIDISM: ICD-10-CM

## 2024-05-03 DIAGNOSIS — E53.8 VITAMIN B12 DEFICIENCY DISEASE: Primary | ICD-10-CM

## 2024-05-03 DIAGNOSIS — Z12.31 ENCOUNTER FOR SCREENING MAMMOGRAM FOR BREAST CANCER: ICD-10-CM

## 2024-05-03 DIAGNOSIS — M81.6 LOCALIZED OSTEOPOROSIS WITHOUT CURRENT PATHOLOGICAL FRACTURE: ICD-10-CM

## 2024-05-03 LAB
ALBUMIN SERPL BCG-MCNC: 4.7 G/DL (ref 3.5–5.2)
ALP SERPL-CCNC: 71 U/L (ref 40–150)
ALT SERPL W P-5'-P-CCNC: 17 U/L (ref 0–50)
ANION GAP SERPL CALCULATED.3IONS-SCNC: 11 MMOL/L (ref 7–15)
AST SERPL W P-5'-P-CCNC: 20 U/L (ref 0–45)
BILIRUB SERPL-MCNC: 0.4 MG/DL
BUN SERPL-MCNC: 12.4 MG/DL (ref 8–23)
CALCIUM SERPL-MCNC: 10 MG/DL (ref 8.8–10.2)
CHLORIDE SERPL-SCNC: 100 MMOL/L (ref 98–107)
CREAT SERPL-MCNC: 0.8 MG/DL (ref 0.51–0.95)
DEPRECATED HCO3 PLAS-SCNC: 28 MMOL/L (ref 22–29)
EGFRCR SERPLBLD CKD-EPI 2021: 80 ML/MIN/1.73M2
GLUCOSE SERPL-MCNC: 93 MG/DL (ref 70–99)
POTASSIUM SERPL-SCNC: 3.8 MMOL/L (ref 3.4–5.3)
PROT SERPL-MCNC: 7.6 G/DL (ref 6.4–8.3)
SODIUM SERPL-SCNC: 139 MMOL/L (ref 135–145)
TSH SERPL DL<=0.005 MIU/L-ACNC: 2.68 UIU/ML (ref 0.3–4.2)

## 2024-05-03 PROCEDURE — 82040 ASSAY OF SERUM ALBUMIN: CPT | Mod: ZL | Performed by: PHYSICIAN ASSISTANT

## 2024-05-03 PROCEDURE — 82607 VITAMIN B-12: CPT | Mod: ZL | Performed by: PHYSICIAN ASSISTANT

## 2024-05-03 PROCEDURE — 99214 OFFICE O/P EST MOD 30 MIN: CPT | Mod: 25 | Performed by: PHYSICIAN ASSISTANT

## 2024-05-03 PROCEDURE — G0439 PPPS, SUBSEQ VISIT: HCPCS | Performed by: PHYSICIAN ASSISTANT

## 2024-05-03 PROCEDURE — 36415 COLL VENOUS BLD VENIPUNCTURE: CPT | Mod: ZL | Performed by: PHYSICIAN ASSISTANT

## 2024-05-03 PROCEDURE — 84443 ASSAY THYROID STIM HORMONE: CPT | Mod: ZL | Performed by: PHYSICIAN ASSISTANT

## 2024-05-03 PROCEDURE — G0463 HOSPITAL OUTPT CLINIC VISIT: HCPCS

## 2024-05-03 PROCEDURE — 82306 VITAMIN D 25 HYDROXY: CPT | Mod: ZL | Performed by: PHYSICIAN ASSISTANT

## 2024-05-03 RX ORDER — MEMANTINE HYDROCHLORIDE 5 MG/1
5 TABLET ORAL 2 TIMES DAILY
Qty: 60 TABLET | Refills: 3 | Status: SHIPPED | OUTPATIENT
Start: 2024-05-03 | End: 2024-08-27

## 2024-05-03 ASSESSMENT — PAIN SCALES - GENERAL: PAINLEVEL: NO PAIN (0)

## 2024-05-03 NOTE — PROGRESS NOTES
Assessment & Plan     Medicare annual wellness visit, subsequent  Sister comes in with her today. Her memory is getting worse.  Saw Dr Aponte and has cognitive issues and memory loss. She is having her sister help her with most of her home concerns. Paperwork is something that overwhelms her.  Sister needs a release to talk to us and Idalia gave us a ok verbally but will send home paper work to get this done. No Medical POA. She is doing well. Happy     Vitamin B12 deficiency disease  Check level. Only took one shot forgot to get the rest of them probably would be better to bring her in for a shot that way we know it would get done.   - Vitamin B12; Future    Acquired hypothyroidism  Labs will be drawn.   - TSH with free T4 reflex; Future    MCI (mild cognitive impairment)  Advancing memory loss.  Not using THC any more . Going to start on Namenda.  Aricept a concern with GI issues.  She will not want to take the XR due to cost. So BID dosing will be used.  See us back in 8 weeks and reassess.   - memantine (NAMENDA) 5 MG tablet; Take 1 tablet (5 mg) by mouth 2 times daily  - Comprehensive metabolic panel (BMP + Alb, Alk Phos, ALT, AST, Total. Bili, TP); Future    Localized osteoporosis without current pathological fracture  Needing a bone density.  Has vit D def and we give her a weekly medicaitons.   - DX Bone Density; Future  - Vitamin D Deficiency; Future    Encounter for screening mammogram for breast cancer  Due for this exam is negative.   - MA Screen Bilateral w/Thaddeus; Future    Vitamin D deficiency  Check level.   - Vitamin D Deficiency; Future    Review of prior external note(s) from - CareEverywhere information from Dr Aponte  reviewed  Review of external notes as documented elsewhere in note  Ordering of each unique test  Prescription drug management  10  minutes spent by me on the date of the encounter doing chart review, history and exam, documentation and further activities per the note        See  Patient Instructions    Return in about 8 weeks (around 2024) for Follow up.    Dianne Friedman is a 68 year old, presenting for the following health issues:  Anxiety, Depression, Lipids, and Thyroid Problem        5/3/2024    10:36 AM   Additional Questions   Roomed by lion mckoy lpn   Accompanied by sister         5/3/2024    10:36 AM   Patient Reported Additional Medications   Patient reports taking the following new medications none     History of Present Illness       Reason for visit:  Annual visit    She eats 2-3 servings of fruits and vegetables daily.She consumes 0 sweetened beverage(s) daily.She exercises with enough effort to increase her heart rate 9 or less minutes per day.  She is missing 1 dose(s) of medications per week.       Hyperlipidemia Follow-Up    Are you regularly taking any medication or supplement to lower your cholesterol?   No  Are you having muscle aches or other side effects that you think could be caused by your cholesterol lowering medication?  No    Depression and Anxiety   How are you doing with your depression since your last visit? Improved   How are you doing with your anxiety since your last visit?  Improved   Are you having other symptoms that might be associated with depression or anxiety? No  Have you had a significant life event? No   Do you have any concerns with your use of alcohol or other drugs? No    Social History     Tobacco Use    Smoking status: Former     Current packs/day: 0.00     Average packs/day: 0.3 packs/day for 1 year (0.3 ttl pk-yrs)     Types: Cigarettes     Start date:      Quit date:      Years since quittin.3     Passive exposure: Past    Smokeless tobacco: Never    Tobacco comments:     quit ; no passive exposure   Vaping Use    Vaping status: Never Used   Substance Use Topics    Alcohol use: Yes     Comment: 1 drink beer and wine daily    Drug use: No         2023     9:00 AM 2023     8:03 AM 1/10/2024     8:15  AM   PHQ   PHQ-9 Total Score 0 8 0   Q9: Thoughts of better off dead/self-harm past 2 weeks Not at all Not at all Not at all         3/22/2023     2:12 PM 5/26/2023     9:00 AM 12/1/2023     8:05 AM   BRADLY-7 SCORE   Total Score 0 (minimal anxiety)  0 (minimal anxiety)   Total Score 0 0 0         1/10/2024     8:15 AM   Last PHQ-9   1.  Little interest or pleasure in doing things 0   2.  Feeling down, depressed, or hopeless 0   3.  Trouble falling or staying asleep, or sleeping too much 0   4.  Feeling tired or having little energy 0   5.  Poor appetite or overeating 0   6.  Feeling bad about yourself 0   7.  Trouble concentrating 0   8.  Moving slowly or restless 0   Q9: Thoughts of better off dead/self-harm past 2 weeks 0   PHQ-9 Total Score 0         12/1/2023     8:05 AM   BRADLY-7    1. Feeling nervous, anxious, or on edge 0   2. Not being able to stop or control worrying 0   3. Worrying too much about different things 0   4. Trouble relaxing 0   5. Being so restless that it is hard to sit still 0   6. Becoming easily annoyed or irritable 0   7. Feeling afraid, as if something awful might happen 0   BRADLY-7 Total Score 0   If you checked any problems, how difficult have they made it for you to do your work, take care of things at home, or get along with other people? Somewhat difficult       Suicide Assessment Five-step Evaluation and Treatment (SAFE-T)    Hypothyroidism Follow-up    Since last visit, patient describes the following symptoms: Weight stable, no hair loss, no skin changes, no constipation, no loose stools    How many servings of fruits and vegetables do you eat daily?  2-3  On average, how many sweetened beverages do you drink each day (Examples: soda, juice, sweet tea, etc.  Do NOT count diet or artificially sweetened beverages)?   1  How many days per week do you exercise enough to make your heart beat faster? 4  How many minutes a day do you exercise enough to make your heart beat faster? 60 or  more  How many days per week do you miss taking your medication? 1  What makes it hard for you to take your medications?  remembering to take      Review of Systems  CONSTITUTIONAL: NEGATIVE for fever, chills, change in weight  INTEGUMENTARY/SKIN: NEGATIVE for worrisome rashes, moles or lesions  EYES: NEGATIVE for vision changes or irritation  ENT/MOUTH: NEGATIVE for ear, mouth and throat problems  RESP: NEGATIVE for significant cough or SOB  BREAST: NEGATIVE for masses, tenderness or discharge  CV: NEGATIVE for chest pain, palpitations or peripheral edema  GI: NEGATIVE for nausea, abdominal pain, heartburn, or change in bowel habits  : NEGATIVE for frequency, dysuria, or hematuria  MUSCULOSKELETAL:elbow tenderness on right lateral elbow.   NEURO: NEGATIVE for weakness, dizziness or paresthesias  ENDOCRINE: NEGATIVE for temperature intolerance, skin/hair changes  HEME: NEGATIVE for bleeding problems  PSYCHIATRIC: NEGATIVE for changes in mood or affect      Objective    /78 (BP Location: Right arm, Patient Position: Sitting, Cuff Size: Adult Regular)   Pulse 74   Temp 98.4  F (36.9  C) (Tympanic)   Resp 16   Wt 68.4 kg (150 lb 12.8 oz)   SpO2 97%   BMI 24.67 kg/m    Body mass index is 24.67 kg/m .  Physical Exam   GENERAL: alert and no distress  EYES: Eyes grossly normal to inspection, PERRL and conjunctivae and sclerae normal  HENT: ear canals and TM's normal, nose and mouth without ulcers or lesions  NECK: no adenopathy, no asymmetry, masses, or scars  RESP: lungs clear to auscultation - no rales, rhonchi or wheezes  CV: regular rate and rhythm, normal S1 S2, no S3 or S4, no murmur, click or rub, no peripheral edema  ABDOMEN: soft, nontender, no hepatosplenomegaly, no masses and bowel sounds normal  MS: no gross musculoskeletal defects noted, no edema  SKIN: no suspicious lesions or rashes  NEURO: Normal strength and tone, mentation intact and speech normal  PSYCH: mentation appears normal, affect  normal/bright  LYMPH: no cervical, supraclavicular, axillary, or inguinal adenopathy    Lab on 01/31/2024   Component Date Value Ref Range Status    Hold Specimen 01/31/2024 [facility]   Final    Cholesterol 01/31/2024 219 (H)  <200 mg/dL Final    Triglycerides 01/31/2024 111  <150 mg/dL Final    Direct Measure HDL 01/31/2024 64  >=50 mg/dL Final    LDL Cholesterol Calculated 01/31/2024 133 (H)  <=100 mg/dL Final    Non HDL Cholesterol 01/31/2024 155 (H)  <130 mg/dL Final     No results found for any visits on 05/03/24.        Signed Electronically by: MARCOS John

## 2024-05-03 NOTE — PATIENT INSTRUCTIONS
"Patient Education   Coping with Memory Loss  What happens when you have memory loss?  Memory loss causes problems with thinking, learning, and memory. You may feel forgetful or have trouble focusing on tasks.  Memory loss may be a side effect of medicine, chemotherapy, or radiation. In these cases, problems with memory may improve after you finish your treatment. But you could have long-term problems.  Other factors that affect memory and how well you can focus include:  Aging  Illness  Depression  Hormonal changes  Anemia (low red blood cells)  Stress  What can I do to treat or prevent memory loss?  Problems with thinking and memory can be very frustrating. There is no standard treatment at this time. But there are things you can do to reduce the effects of memory loss:  Reach out and ask for help from your care team, family, and friends.  Really pay attention. Use your eyes, ears, and sense of touch to remember things. Focus when someone tells you something.  Limit distractions when you need to focus on completing a task.  Tell yourself what you're doing as you do it. For example, if you're going out for a few hours, as you close the door tell yourself, \"I'm locking the door now and putting the key in my pocket, so I don't have to worry about whether I locked the door.\"  Give yourself clear reminders. If you need to buy dog food, put the empty bag at the door so you'll see it as you leave the house. Or write yourself a note and put it where it's needed.  Keep things in the same place. This way you don't have to wonder where you put your keys, remote control, or medicine.  Keep a journal of daily events and activities. Carry a notebook and write down important information that you want to remember.  Exercise your brain. For example, do crossword puzzles, painting, sudoku.  Use word play and rhyming to help remember things.  Use helpful tools, such as:  Daily planner  Wall calendar  Checklists  Sticky notes  Key " stone near the door  Pre-programmed phone  Wristwatch with an alarm  Pill box to keep track of your medicines  Get plenty of sleep and exercise each day.  Stay positive, and try to manage stress.  If you think you may be depressed, talk to your doctor. Depression should be treated.  When should I call my care team?  Call your care team if:  You feel depressed  You can't complete basic daily chores  Comments:  __________________________________________  __________________________________________  __________________________________________  __________________________________________  __________________________________________  __________________________________________  __________________________________________  For informational purposes only. Not to replace the advice of your health care provider. Copyright   2006 [employer]. All rights reserved. Clinically reviewed by [facility]. Gideon [id number] - REV 11/22.

## 2024-05-04 LAB
VIT B12 SERPL-MCNC: 1033 PG/ML (ref 232–1245)
VIT D+METAB SERPL-MCNC: 83 NG/ML (ref 20–50)

## 2024-05-07 ENCOUNTER — TELEPHONE (OUTPATIENT)
Dept: MAMMOGRAPHY | Facility: OTHER | Age: 69
End: 2024-05-07

## 2024-05-07 ENCOUNTER — HOSPITAL ENCOUNTER (OUTPATIENT)
Dept: BONE DENSITY | Facility: HOSPITAL | Age: 69
Discharge: HOME | End: 2024-05-07
Attending: PHYSICIAN ASSISTANT
Payer: COMMERCIAL

## 2024-05-07 ENCOUNTER — ANCILLARY PROCEDURE (OUTPATIENT)
Dept: MAMMOGRAPHY | Facility: OTHER | Age: 69
End: 2024-05-07
Attending: PHYSICIAN ASSISTANT
Payer: COMMERCIAL

## 2024-05-07 DIAGNOSIS — Z12.31 ENCOUNTER FOR SCREENING MAMMOGRAM FOR BREAST CANCER: ICD-10-CM

## 2024-05-07 DIAGNOSIS — M81.6 LOCALIZED OSTEOPOROSIS WITHOUT CURRENT PATHOLOGICAL FRACTURE: ICD-10-CM

## 2024-05-07 PROCEDURE — 77080 DXA BONE DENSITY AXIAL: CPT

## 2024-05-07 PROCEDURE — 77063 BREAST TOMOSYNTHESIS BI: CPT | Mod: TC

## 2024-05-08 NOTE — RESULT ENCOUNTER NOTE
You have osteoporosis. We need to make sure you are getting 2000 unit(s) vitamin D and 1200 mg of calcium in supplement.  You can also take a supplement that is one a week like Fosamax . Helps build bone. But needs to be taken weekly with full glass of water. You then must sit upright for one hour before you take any other fluids or foods or medications.  There are some once very 3 months.  But need to fail the weekly.  Please call her sister Bina with this information due to memory issues.

## 2024-05-09 DIAGNOSIS — M81.0 AGE-RELATED OSTEOPOROSIS WITHOUT CURRENT PATHOLOGICAL FRACTURE: Primary | ICD-10-CM

## 2024-05-09 RX ORDER — ALENDRONATE SODIUM 70 MG/1
70 TABLET ORAL
Qty: 12 TABLET | Refills: 3 | Status: SHIPPED | OUTPATIENT
Start: 2024-05-09 | End: 2024-08-29

## 2024-07-10 RX ORDER — ZOLEDRONIC ACID 5 MG/100ML
5 INJECTION, SOLUTION INTRAVENOUS ONCE
Status: SHIPPED | OUTPATIENT
Start: 2024-07-10

## 2024-08-02 RX ORDER — CYANOCOBALAMIN 1000 UG/ML
1000 INJECTION, SOLUTION INTRAMUSCULAR; SUBCUTANEOUS
Status: ACTIVE | OUTPATIENT
Start: 2024-08-02

## 2024-08-03 ENCOUNTER — HOSPITAL ENCOUNTER (EMERGENCY)
Facility: HOSPITAL | Age: 69
Discharge: HOME | End: 2024-08-03
Attending: PHYSICIAN ASSISTANT | Admitting: PHYSICIAN ASSISTANT
Payer: COMMERCIAL

## 2024-08-03 VITALS
TEMPERATURE: 97.6 F | DIASTOLIC BLOOD PRESSURE: 85 MMHG | HEART RATE: 77 BPM | RESPIRATION RATE: 19 BRPM | OXYGEN SATURATION: 96 % | SYSTOLIC BLOOD PRESSURE: 132 MMHG

## 2024-08-03 DIAGNOSIS — L03.115 CELLULITIS OF RIGHT LOWER EXTREMITY: ICD-10-CM

## 2024-08-03 PROCEDURE — G0463 HOSPITAL OUTPT CLINIC VISIT: HCPCS

## 2024-08-03 PROCEDURE — 99213 OFFICE O/P EST LOW 20 MIN: CPT | Performed by: PHYSICIAN ASSISTANT

## 2024-08-03 RX ORDER — CEFADROXIL 500 MG/1
500 CAPSULE ORAL 2 TIMES DAILY
Qty: 14 CAPSULE | Refills: 0 | Status: SHIPPED | OUTPATIENT
Start: 2024-08-03 | End: 2024-08-10

## 2024-08-03 ASSESSMENT — ENCOUNTER SYMPTOMS: WOUND: 1

## 2024-08-03 ASSESSMENT — COLUMBIA-SUICIDE SEVERITY RATING SCALE - C-SSRS
1. IN THE PAST MONTH, HAVE YOU WISHED YOU WERE DEAD OR WISHED YOU COULD GO TO SLEEP AND NOT WAKE UP?: NO
6. HAVE YOU EVER DONE ANYTHING, STARTED TO DO ANYTHING, OR PREPARED TO DO ANYTHING TO END YOUR LIFE?: NO
2. HAVE YOU ACTUALLY HAD ANY THOUGHTS OF KILLING YOURSELF IN THE PAST MONTH?: NO
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
## 2024-08-18 ENCOUNTER — MYC MEDICAL ADVICE (OUTPATIENT)
Dept: FAMILY MEDICINE | Facility: OTHER | Age: 69
End: 2024-08-18

## 2024-08-18 DIAGNOSIS — E53.8 VITAMIN B12 DEFICIENCY DISEASE: Primary | ICD-10-CM

## 2024-08-18 DIAGNOSIS — E53.8 VITAMIN B12 DEFICIENCY (NON ANEMIC): ICD-10-CM

## 2024-08-18 RX ORDER — MEPERIDINE HYDROCHLORIDE 25 MG/ML
25 INJECTION INTRAMUSCULAR; INTRAVENOUS; SUBCUTANEOUS EVERY 30 MIN PRN
Status: CANCELLED | OUTPATIENT
Start: 2024-08-18

## 2024-08-18 RX ORDER — CYANOCOBALAMIN 1000 UG/ML
1000 INJECTION, SOLUTION INTRAMUSCULAR; SUBCUTANEOUS ONCE
Status: CANCELLED
Start: 2024-08-18 | End: 2024-08-18

## 2024-08-18 RX ORDER — ALBUTEROL SULFATE 0.83 MG/ML
2.5 SOLUTION RESPIRATORY (INHALATION)
Status: CANCELLED | OUTPATIENT
Start: 2024-08-18

## 2024-08-18 RX ORDER — ALBUTEROL SULFATE 90 UG/1
1-2 AEROSOL, METERED RESPIRATORY (INHALATION)
Status: CANCELLED
Start: 2024-08-18

## 2024-08-18 RX ORDER — METHYLPREDNISOLONE SODIUM SUCCINATE 125 MG/2ML
125 INJECTION, POWDER, LYOPHILIZED, FOR SOLUTION INTRAMUSCULAR; INTRAVENOUS
Status: CANCELLED
Start: 2024-08-18

## 2024-08-18 RX ORDER — EPINEPHRINE 1 MG/ML
0.3 INJECTION, SOLUTION INTRAMUSCULAR; SUBCUTANEOUS EVERY 5 MIN PRN
Status: CANCELLED | OUTPATIENT
Start: 2024-08-18

## 2024-08-18 RX ORDER — DIPHENHYDRAMINE HYDROCHLORIDE 50 MG/ML
50 INJECTION INTRAMUSCULAR; INTRAVENOUS
Status: CANCELLED
Start: 2024-08-18

## 2024-08-19 NOTE — TELEPHONE ENCOUNTER
Idalia Etienne. This medication is showing discontinued as of 05/03/2024 and is not active on your medication list. Please call to schedule an appointment with your PCP.   
Attending Attestation (For Attendings USE Only)...

## 2024-08-27 DIAGNOSIS — G31.84 MCI (MILD COGNITIVE IMPAIRMENT): ICD-10-CM

## 2024-08-27 RX ORDER — MEMANTINE HYDROCHLORIDE 5 MG/1
5 TABLET ORAL 2 TIMES DAILY
Qty: 60 TABLET | Refills: 0 | Status: SHIPPED | OUTPATIENT
Start: 2024-08-27 | End: 2024-08-29

## 2024-08-27 NOTE — TELEPHONE ENCOUNTER
Memantine HCl 5 MG Oral Tablet       Last Written Prescription Date:  05/03/2024  Last Fill Quantity: 60,   # refills: 3  Last Office Visit: 05/03/2024  Future Office visit:       Routing refill request to provider for review/approval because:    Miscellaneous Dementia Agents [id number] 06:14 AM   Protocol Details Medication indicated for associated diagnosis          Kimberly Boecker, RN

## 2024-08-29 ENCOUNTER — OFFICE VISIT (OUTPATIENT)
Dept: FAMILY MEDICINE | Facility: OTHER | Age: 69
End: 2024-08-29
Attending: PHYSICIAN ASSISTANT
Payer: COMMERCIAL

## 2024-08-29 VITALS
DIASTOLIC BLOOD PRESSURE: 68 MMHG | HEIGHT: 65 IN | RESPIRATION RATE: 18 BRPM | BODY MASS INDEX: 24.61 KG/M2 | TEMPERATURE: 100.6 F | HEART RATE: 110 BPM | SYSTOLIC BLOOD PRESSURE: 108 MMHG | OXYGEN SATURATION: 97 % | WEIGHT: 147.7 LBS

## 2024-08-29 DIAGNOSIS — M81.0 AGE-RELATED OSTEOPOROSIS WITHOUT CURRENT PATHOLOGICAL FRACTURE: ICD-10-CM

## 2024-08-29 DIAGNOSIS — R41.840 ATTENTION AND CONCENTRATION DEFICIT: ICD-10-CM

## 2024-08-29 DIAGNOSIS — G47.10 HYPERSOMNOLENCE DISORDER, PERSISTENT: ICD-10-CM

## 2024-08-29 DIAGNOSIS — F33.9 EPISODE OF RECURRENT MAJOR DEPRESSIVE DISORDER, UNSPECIFIED DEPRESSION EPISODE SEVERITY (H): Primary | ICD-10-CM

## 2024-08-29 DIAGNOSIS — E55.9 VITAMIN D DEFICIENCY DISEASE: ICD-10-CM

## 2024-08-29 DIAGNOSIS — G31.84 MCI (MILD COGNITIVE IMPAIRMENT): ICD-10-CM

## 2024-08-29 PROCEDURE — G0463 HOSPITAL OUTPT CLINIC VISIT: HCPCS

## 2024-08-29 PROCEDURE — G0463 HOSPITAL OUTPT CLINIC VISIT: HCPCS | Mod: 25

## 2024-08-29 PROCEDURE — 99214 OFFICE O/P EST MOD 30 MIN: CPT | Performed by: PHYSICIAN ASSISTANT

## 2024-08-29 PROCEDURE — 250N000011 HC RX IP 250 OP 636: Performed by: PHYSICIAN ASSISTANT

## 2024-08-29 PROCEDURE — 96372 THER/PROPH/DIAG INJ SC/IM: CPT | Performed by: PHYSICIAN ASSISTANT

## 2024-08-29 RX ORDER — CYANOCOBALAMIN 1000 UG/ML
1000 INJECTION, SOLUTION INTRAMUSCULAR; SUBCUTANEOUS
Status: DISCONTINUED | OUTPATIENT
Start: 2024-08-29 | End: 2024-09-02

## 2024-08-29 RX ORDER — ALENDRONATE SODIUM 70 MG/1
70 TABLET ORAL
Qty: 12 TABLET | Refills: 3 | Status: SHIPPED | OUTPATIENT
Start: 2024-08-29

## 2024-08-29 RX ORDER — RIVASTIGMINE 4.6 MG/24H
1 PATCH, EXTENDED RELEASE TRANSDERMAL DAILY
Qty: 30 PATCH | Refills: 1 | Status: SHIPPED | OUTPATIENT
Start: 2024-08-29 | End: 2024-09-30

## 2024-08-29 RX ORDER — METHYLPHENIDATE HYDROCHLORIDE 10 MG/1
10 TABLET ORAL 2 TIMES DAILY
Qty: 60 TABLET | Refills: 0 | Status: SHIPPED | OUTPATIENT
Start: 2024-08-29 | End: 2024-09-30

## 2024-08-29 RX ADMIN — CYANOCOBALAMIN 1000 MCG: 1000 INJECTION, SOLUTION INTRAMUSCULAR at 16:32

## 2024-08-29 ASSESSMENT — PATIENT HEALTH QUESTIONNAIRE - PHQ9
SUM OF ALL RESPONSES TO PHQ QUESTIONS 1-9: 5
SUM OF ALL RESPONSES TO PHQ QUESTIONS 1-9: 5

## 2024-08-29 ASSESSMENT — PAIN SCALES - GENERAL: PAINLEVEL: MODERATE PAIN (5)

## 2024-08-29 ASSESSMENT — ANXIETY QUESTIONNAIRES
7. FEELING AFRAID AS IF SOMETHING AWFUL MIGHT HAPPEN: NOT AT ALL
8. IF YOU CHECKED OFF ANY PROBLEMS, HOW DIFFICULT HAVE THESE MADE IT FOR YOU TO DO YOUR WORK, TAKE CARE OF THINGS AT HOME, OR GET ALONG WITH OTHER PEOPLE?: NOT DIFFICULT AT ALL
GAD7 TOTAL SCORE: 0

## 2024-08-29 NOTE — PROGRESS NOTES
Zeinab is accompanied by sister Bina. She has POA for health care   Assessment & Plan     Attention and concentration deficit  Idalia has had long standing difficulty with Add and ADHD. Her evaluation by Dr Aponte stated she doesn't often listen well to questions and answers with out thinking about what she says.  We will try low dose Ritalin twice a day.  She is sleeping many times in the day. Feels her medication is really  making her tired. She is also not taking her medication like recommended and her memory loss is advancing more rapidly than we would like.   - methylphenidate (RITALIN) 10 MG tablet; Take 1 tablet (10 mg) by mouth 2 times daily.    Vitamin D deficiency disease  Start her back on weekly Vit D. Sister is setting up medications. If left to Idalia she will take many things at once.    - vitamin D3 (CHOLECALCIFEROL) 1.25 MG (14753 UT) capsule; Take 1 capsule (50,000 Units) by mouth every 7 days.    Episode of recurrent major depressive disorder, unspecified depression episode severity (H24)  Her loss of memory had cause anxiety and depression. Hx of use of daily THC was also a contributing factor.  No longer does much THC. Makes her more anxious than helpful. Checking labs.   - CBC with platelets and differential; Future  - Lipid Profile (Chol, Trig, HDL, LDL calc); Future  - Comprehensive metabolic panel (BMP + Alb, Alk Phos, ALT, AST, Total. Bili, TP); Future  - UA with Microscopic reflex to Culture - [place]; Future  - TSH with free T4 reflex; Future    Hypersomnolence disorder, persistent  Starting her on low dose Ritalin. Recheck 6 to 8 weeks.   - CBC with platelets and differential; Future  - Lipid Profile (Chol, Trig, HDL, LDL calc); Future  - Comprehensive metabolic panel (BMP + Alb, Alk Phos, ALT, AST, Total. Bili, TP); Future  - UA with Microscopic reflex to Culture - [place]; Future  - TSH with free T4 reflex; Future    MCI (mild cognitive impairment)  Change to Exelon. We could not get  her mediations affordable at daily use so we will get her Exelon patch covered. At least we know this will be helpful to know daily medications.  We are also going to make a referral to care coordination.  She just is not getting medications as scheduled. Can't recall if she  has taken a pill. Even in a pill container.   - rivastigmine (EXELON) 4.6 MG/24HR 24 hr patch; Place 1 patch over 24 hours onto the skin daily.  - Primary Care - Care Coordination Referral  - cyanocobalamin injection 1,000 mcg    Age-related osteoporosis without current pathological fracture  Recheck levels and Fosamax weekly.   - alendronate (FOSAMAX) 70 MG tablet; Take 1 tablet (70 mg) by mouth every 7 days.  - CBC with platelets and differential; Future  - Lipid Profile (Chol, Trig, HDL, LDL calc); Future  - Comprehensive metabolic panel (BMP + Alb, Alk Phos, ALT, AST, Total. Bili, TP); Future  - UA with Microscopic reflex to Culture - [place]; Future  - TSH with free T4 reflex; Future            See Patient Instructions    No follow-ups on file.    Dianne Friedman is a 68 year old, presenting for the following health issues:  No chief complaint on file.        8/29/2024     2:36 PM   Additional Questions   Roomed by Helena Dhillon   Accompanied by self         8/29/2024     2:36 PM   Patient Reported Additional Medications   Patient reports taking the following new medications none     History of Present Illness       Reason for visit:  Followup She is missing 1 dose(s) of medications per week.       Concern - Memory changes  Onset: Ongoing  Description: Unable to focus.  Intensity: moderate  Progression of Symptoms:  same  Accompanying Signs & Symptoms: forgetful   Previous history of similar problem: no  Precipitating factors:        Worsened by: having to remember things  Alleviating factors:        Improved by: na   Therapies tried and outcome: None    Hyperlipidemia Follow-Up    Are you regularly taking any medication or supplement to  lower your cholesterol?   No  Are you having muscle aches or other side effects that you think could be caused by your cholesterol lowering medication?  No    Depression and Anxiety   How are you doing with your depression since your last visit? Improved   How are you doing with your anxiety since your last visit?  Improved   Are you having other symptoms that might be associated with depression or anxiety? Yes:  does not reach out to others, very sleepy   Have you had a significant life event? No   Do you have any concerns with your use of alcohol or other drugs? No    Social History     Tobacco Use    Smoking status: Former     Current packs/day: 0.00     Average packs/day: 0.3 packs/day for 1 year (0.3 ttl pk-yrs)     Types: Cigarettes     Start date:      Quit date:      Years since quittin.6     Passive exposure: Past    Smokeless tobacco: Never    Tobacco comments:     quit ; no passive exposure   Vaping Use    Vaping status: Never Used   Substance Use Topics    Alcohol use: Yes     Comment: 1 drink beer and wine daily    Drug use: No         2023     8:03 AM 1/10/2024     8:15 AM 2024     2:37 PM   PHQ   PHQ-9 Total Score 8 0 5   Q9: Thoughts of better off dead/self-harm past 2 weeks Not at all Not at all Not at all         2023     9:00 AM 2023     8:05 AM 2024     2:40 PM   BRADLY-7 SCORE   Total Score  0 (minimal anxiety) 0 (minimal anxiety)   Total Score 0 0 0         2024     2:37 PM   Last PHQ-9   1.  Little interest or pleasure in doing things 1   2.  Feeling down, depressed, or hopeless 0   3.  Trouble falling or staying asleep, or sleeping too much 3   4.  Feeling tired or having little energy 1   5.  Poor appetite or overeating 0   6.  Feeling bad about yourself 0   7.  Trouble concentrating 0   8.  Moving slowly or restless 0   Q9: Thoughts of better off dead/self-harm past 2 weeks 0   PHQ-9 Total Score 5         2024     2:40 PM   BRADLY-7    1.  Feeling nervous, anxious, or on edge 0   2. Not being able to stop or control worrying 0   3. Worrying too much about different things 0   4. Trouble relaxing 0   5. Being so restless that it is hard to sit still 0   6. Becoming easily annoyed or irritable 0   7. Feeling afraid, as if something awful might happen 0   BRADLY-7 Total Score 0   If you checked any problems, how difficult have they made it for you to do your work, take care of things at home, or get along with other people? Not difficult at all       Suicide Assessment Five-step Evaluation and Treatment (SAFE-T)    Hypothyroidism Follow-up    Since last visit, patient describes the following symptoms: Weight stable, no hair loss, no skin changes, no constipation, no loose stools and fatigue  How many servings of fruits and vegetables do you eat daily?  0-1  On average, how many sweetened beverages do you drink each day (Examples: soda, juice, sweet tea, etc.  Do NOT count diet or artificially sweetened beverages)?   0  How many days per week do you exercise enough to make your heart beat faster? 3 or less  How many minutes a day do you exercise enough to make your heart beat faster? 9 or less  How many days per week do you miss taking your medication? 4  What makes it hard for you to take your medications?  remembering to take      Review of Systems  Constitutional, neuro, ENT, endocrine, pulmonary, cardiac, gastrointestinal, genitourinary, musculoskeletal, integument and psychiatric systems are negative, except as otherwise noted.      Objective    There were no vitals taken for this visit.  There is no height or weight on file to calculate BMI.  Physical Exam   GENERAL: alert and no distress  NECK: no adenopathy, no asymmetry, masses, or scars  RESP: lungs clear to auscultation - no rales, rhonchi or wheezes  CV: regular rate and rhythm, normal S1 S2, no S3 or S4, no murmur, click or rub, no peripheral edema  ABDOMEN: soft, nontender, no  hepatosplenomegaly, no masses and bowel sounds normal  MS: no gross musculoskeletal defects noted, no edema  PSYCH: concentration poor, inattentive, affect normal/bright, judgement and insight impaired, and very poor recall of short term conversations. Has trouble paying attention.     No results found for any visits on 08/29/24.      No results found for any visits on 08/29/24.  Office Visit on 05/03/2024   Component Date Value Ref Range Status    Vitamin B12 05/03/2024 1,033  232 - 1,245 pg/mL Final    Sodium 05/03/2024 139  135 - 145 mmol/L Final    Reference intervals for this test were updated on 09/26/2023 to more accurately reflect our healthy population. There may be differences in the flagging of prior results with similar values performed with this method. Interpretation of those prior results can be made in the context of the updated reference intervals.     Potassium 05/03/2024 3.8  3.4 - 5.3 mmol/L Final    Carbon Dioxide (CO2) 05/03/2024 28  22 - 29 mmol/L Final    Anion Gap 05/03/2024 11  7 - 15 mmol/L Final    Urea Nitrogen 05/03/2024 12.4  8.0 - 23.0 mg/dL Final    Creatinine 05/03/2024 0.80  0.51 - 0.95 mg/dL Final    GFR Estimate 05/03/2024 80  >60 mL/min/1.73m2 Final    Calcium 05/03/2024 10.0  8.8 - 10.2 mg/dL Final    Chloride 05/03/2024 100  98 - 107 mmol/L Final    Glucose 05/03/2024 93  70 - 99 mg/dL Final    Alkaline Phosphatase 05/03/2024 71  40 - 150 U/L Final    Reference intervals for this test were updated on 11/14/2023 to more accurately reflect our healthy population. There may be differences in the flagging of prior results with similar values performed with this method. Interpretation of those prior results can be made in the context of the updated reference intervals.    AST 05/03/2024 20  0 - 45 U/L Final    Reference intervals for this test were updated on 6/12/2023 to more accurately reflect our healthy population. There may be differences in the flagging of prior results with  similar values performed with this method. Interpretation of those prior results can be made in the context of the updated reference intervals.    ALT 05/03/2024 17  0 - 50 U/L Final    Reference intervals for this test were updated on 6/12/2023 to more accurately reflect our healthy population. There may be differences in the flagging of prior results with similar values performed with this method. Interpretation of those prior results can be made in the context of the updated reference intervals.      Protein Total 05/03/2024 7.6  6.4 - 8.3 g/dL Final    Albumin 05/03/2024 4.7  3.5 - 5.2 g/dL Final    Bilirubin Total 05/03/2024 0.4  <=1.2 mg/dL Final    TSH 05/03/2024 2.68  0.30 - 4.20 uIU/mL Final    Vitamin D, Total (25-Hydroxy) 05/03/2024 83 (H)  20 - 50 ng/mL Final    toxicity possible       Signed Electronically by: MARCOS John

## 2024-09-04 ENCOUNTER — LAB (OUTPATIENT)
Dept: LAB | Facility: OTHER | Age: 69
End: 2024-09-04
Payer: COMMERCIAL

## 2024-09-04 DIAGNOSIS — F33.9 EPISODE OF RECURRENT MAJOR DEPRESSIVE DISORDER, UNSPECIFIED DEPRESSION EPISODE SEVERITY (H): ICD-10-CM

## 2024-09-04 DIAGNOSIS — E78.5 HYPERLIPIDEMIA LDL GOAL <70: Primary | ICD-10-CM

## 2024-09-04 DIAGNOSIS — G47.10 HYPERSOMNOLENCE DISORDER, PERSISTENT: ICD-10-CM

## 2024-09-04 DIAGNOSIS — M81.0 AGE-RELATED OSTEOPOROSIS WITHOUT CURRENT PATHOLOGICAL FRACTURE: ICD-10-CM

## 2024-09-04 LAB
ALBUMIN SERPL BCG-MCNC: 4.2 G/DL (ref 3.5–5.2)
ALBUMIN UR-MCNC: NEGATIVE MG/DL
ALP SERPL-CCNC: 73 U/L (ref 40–150)
ALT SERPL W P-5'-P-CCNC: 19 U/L (ref 0–50)
ANION GAP SERPL CALCULATED.3IONS-SCNC: 13 MMOL/L (ref 7–15)
APPEARANCE UR: CLEAR
AST SERPL W P-5'-P-CCNC: 20 U/L (ref 0–45)
BASOPHILS # BLD AUTO: 0 10E3/UL (ref 0–0.2)
BASOPHILS NFR BLD AUTO: 1 %
BILIRUB SERPL-MCNC: 0.4 MG/DL
BILIRUB UR QL STRIP: NEGATIVE
BUN SERPL-MCNC: 9.5 MG/DL (ref 8–23)
CALCIUM SERPL-MCNC: 9.4 MG/DL (ref 8.8–10.4)
CHLORIDE SERPL-SCNC: 104 MMOL/L (ref 98–107)
CHOLEST SERPL-MCNC: 266 MG/DL
COLOR UR AUTO: ABNORMAL
CREAT SERPL-MCNC: 0.85 MG/DL (ref 0.51–0.95)
EGFRCR SERPLBLD CKD-EPI 2021: 75 ML/MIN/1.73M2
EOSINOPHIL # BLD AUTO: 0.3 10E3/UL (ref 0–0.7)
EOSINOPHIL NFR BLD AUTO: 5 %
ERYTHROCYTE [DISTWIDTH] IN BLOOD BY AUTOMATED COUNT: 13.7 % (ref 10–15)
FASTING STATUS PATIENT QL REPORTED: YES
FASTING STATUS PATIENT QL REPORTED: YES
GLUCOSE SERPL-MCNC: 112 MG/DL (ref 70–99)
GLUCOSE UR STRIP-MCNC: NEGATIVE MG/DL
HCO3 SERPL-SCNC: 23 MMOL/L (ref 22–29)
HCT VFR BLD AUTO: 39.4 % (ref 35–47)
HDLC SERPL-MCNC: 58 MG/DL
HGB BLD-MCNC: 13.3 G/DL (ref 11.7–15.7)
HGB UR QL STRIP: NEGATIVE
IMM GRANULOCYTES # BLD: 0 10E3/UL
IMM GRANULOCYTES NFR BLD: 0 %
KETONES UR STRIP-MCNC: NEGATIVE MG/DL
LDLC SERPL CALC-MCNC: 182 MG/DL
LEUKOCYTE ESTERASE UR QL STRIP: NEGATIVE
LYMPHOCYTES # BLD AUTO: 1.9 10E3/UL (ref 0.8–5.3)
LYMPHOCYTES NFR BLD AUTO: 35 %
MCH RBC QN AUTO: 32.2 PG (ref 26.5–33)
MCHC RBC AUTO-ENTMCNC: 33.8 G/DL (ref 31.5–36.5)
MCV RBC AUTO: 95 FL (ref 78–100)
MONOCYTES # BLD AUTO: 0.5 10E3/UL (ref 0–1.3)
MONOCYTES NFR BLD AUTO: 8 %
MUCOUS THREADS #/AREA URNS LPF: PRESENT /LPF
NEUTROPHILS # BLD AUTO: 2.7 10E3/UL (ref 1.6–8.3)
NEUTROPHILS NFR BLD AUTO: 51 %
NITRATE UR QL: NEGATIVE
NONHDLC SERPL-MCNC: 208 MG/DL
NRBC # BLD AUTO: 0 10E3/UL
NRBC BLD AUTO-RTO: 0 /100
PH UR STRIP: 7 [PH] (ref 4.7–8)
PLATELET # BLD AUTO: 290 10E3/UL (ref 150–450)
POTASSIUM SERPL-SCNC: 4.1 MMOL/L (ref 3.4–5.3)
PROT SERPL-MCNC: 7.2 G/DL (ref 6.4–8.3)
RBC # BLD AUTO: 4.13 10E6/UL (ref 3.8–5.2)
RBC URINE: <1 /HPF
SODIUM SERPL-SCNC: 140 MMOL/L (ref 135–145)
SP GR UR STRIP: 1.02 (ref 1–1.03)
SQUAMOUS EPITHELIAL: 0 /HPF
TRIGL SERPL-MCNC: 131 MG/DL
UROBILINOGEN UR STRIP-MCNC: NORMAL MG/DL
WBC # BLD AUTO: 5.3 10E3/UL (ref 4–11)
WBC URINE: <1 /HPF

## 2024-09-04 PROCEDURE — 80061 LIPID PANEL: CPT | Mod: ZL

## 2024-09-04 PROCEDURE — 81001 URINALYSIS AUTO W/SCOPE: CPT | Mod: ZL

## 2024-09-04 PROCEDURE — 85041 AUTOMATED RBC COUNT: CPT | Mod: ZL

## 2024-09-04 PROCEDURE — 82040 ASSAY OF SERUM ALBUMIN: CPT | Mod: ZL

## 2024-09-04 PROCEDURE — 36415 COLL VENOUS BLD VENIPUNCTURE: CPT | Mod: ZL

## 2024-09-05 NOTE — RESULT ENCOUNTER NOTE
You need help with your cholesterol. Your numbers are very high . Goal LDL is 70 , your is well over 180.   This is daily medications. Your Glucose is about 20 points higher than last check. Watch carbohydrates.

## 2024-09-06 ENCOUNTER — MYC MEDICAL ADVICE (OUTPATIENT)
Dept: FAMILY MEDICINE | Facility: OTHER | Age: 69
End: 2024-09-06

## 2024-09-06 NOTE — TELEPHONE ENCOUNTER
See my chart message.   Need to clarify medication question for pt.   Copied from LOV note:  MCI (mild cognitive impairment)  Change to Exelon. We could not get her mediations affordable at daily use so we will get her Exelon patch covered. At least we know this will be helpful to know daily medications.  We are also going to make a referral to care coordination.  She just is not getting medications as scheduled. Can't recall if she  has taken a pill. Even in a pill container.   - rivastigmine (EXELON) 4.6 MG/24HR 24 hr patch; Place 1 patch over 24 hours onto the skin daily.      Pt notified Memantine was discontinued on 8/29/24 and reviewed LOV note with pt.   Pt reports rivastigmine Exelon patch out of pocket $69 picked it up today. Pt stated pharmacy told her they may be able to get it cheaper for her.     Matilde Lopez, RN

## 2024-09-12 ENCOUNTER — TELEPHONE (OUTPATIENT)
Dept: INFUSION THERAPY | Facility: OTHER | Age: 69
End: 2024-09-12

## 2024-09-22 RX ORDER — ATORVASTATIN CALCIUM 20 MG/1
20 TABLET, FILM COATED ORAL DAILY
Qty: 30 TABLET | Refills: 2 | Status: SHIPPED | OUTPATIENT
Start: 2024-09-22 | End: 2024-09-30

## 2024-09-30 ENCOUNTER — PATIENT OUTREACH (OUTPATIENT)
Dept: CARE COORDINATION | Facility: OTHER | Age: 69
End: 2024-09-30

## 2024-09-30 ENCOUNTER — OFFICE VISIT (OUTPATIENT)
Dept: FAMILY MEDICINE | Facility: OTHER | Age: 69
End: 2024-09-30
Attending: PHYSICIAN ASSISTANT
Payer: COMMERCIAL

## 2024-09-30 VITALS
DIASTOLIC BLOOD PRESSURE: 80 MMHG | RESPIRATION RATE: 16 BRPM | OXYGEN SATURATION: 99 % | SYSTOLIC BLOOD PRESSURE: 120 MMHG | TEMPERATURE: 98.9 F | HEART RATE: 76 BPM | WEIGHT: 146.9 LBS | BODY MASS INDEX: 24.45 KG/M2

## 2024-09-30 DIAGNOSIS — G31.84 MCI (MILD COGNITIVE IMPAIRMENT): ICD-10-CM

## 2024-09-30 DIAGNOSIS — F33.9 EPISODE OF RECURRENT MAJOR DEPRESSIVE DISORDER, UNSPECIFIED DEPRESSION EPISODE SEVERITY (H): ICD-10-CM

## 2024-09-30 DIAGNOSIS — E78.5 HYPERLIPIDEMIA LDL GOAL <70: ICD-10-CM

## 2024-09-30 DIAGNOSIS — E53.8 VITAMIN B12 DEFICIENCY DISEASE: ICD-10-CM

## 2024-09-30 DIAGNOSIS — R41.840 ATTENTION AND CONCENTRATION DEFICIT: ICD-10-CM

## 2024-09-30 DIAGNOSIS — Z23 HIGH PRIORITY FOR 2019-NCOV VACCINE: Primary | ICD-10-CM

## 2024-09-30 DIAGNOSIS — M35.00 SICCA SYNDROME (H): ICD-10-CM

## 2024-09-30 PROCEDURE — 90480 ADMN SARSCOV2 VAC 1/ONLY CMP: CPT

## 2024-09-30 PROCEDURE — G0463 HOSPITAL OUTPT CLINIC VISIT: HCPCS | Mod: 25

## 2024-09-30 PROCEDURE — 250N000011 HC RX IP 250 OP 636: Performed by: PHYSICIAN ASSISTANT

## 2024-09-30 PROCEDURE — 96372 THER/PROPH/DIAG INJ SC/IM: CPT | Performed by: PHYSICIAN ASSISTANT

## 2024-09-30 PROCEDURE — G0463 HOSPITAL OUTPT CLINIC VISIT: HCPCS

## 2024-09-30 PROCEDURE — 91320 SARSCV2 VAC 30MCG TRS-SUC IM: CPT

## 2024-09-30 PROCEDURE — 99214 OFFICE O/P EST MOD 30 MIN: CPT | Performed by: PHYSICIAN ASSISTANT

## 2024-09-30 RX ORDER — RIVASTIGMINE 4.6 MG/24H
1 PATCH, EXTENDED RELEASE TRANSDERMAL DAILY
Qty: 30 PATCH | Refills: 4 | Status: SHIPPED | OUTPATIENT
Start: 2024-09-30

## 2024-09-30 RX ORDER — METHYLPHENIDATE HYDROCHLORIDE 10 MG/1
10 TABLET ORAL 2 TIMES DAILY
Qty: 60 TABLET | Refills: 0 | Status: SHIPPED | OUTPATIENT
Start: 2024-09-30

## 2024-09-30 RX ORDER — VENLAFAXINE HYDROCHLORIDE 150 MG/1
150 CAPSULE, EXTENDED RELEASE ORAL DAILY
Qty: 90 CAPSULE | Refills: 1 | Status: SHIPPED | OUTPATIENT
Start: 2024-09-30

## 2024-09-30 RX ORDER — CYANOCOBALAMIN 1000 UG/ML
1000 INJECTION, SOLUTION INTRAMUSCULAR; SUBCUTANEOUS
Status: DISCONTINUED | OUTPATIENT
Start: 2024-09-30 | End: 2024-10-01

## 2024-09-30 RX ORDER — CYANOCOBALAMIN, ISOPROPYL ALCOHOL 1000MCG/ML
1 KIT INJECTION
Qty: 3 KIT | Refills: 4 | Status: SHIPPED | OUTPATIENT
Start: 2024-09-30

## 2024-09-30 RX ORDER — ATORVASTATIN CALCIUM 20 MG/1
20 TABLET, FILM COATED ORAL DAILY
Qty: 90 TABLET | Refills: 2 | Status: SHIPPED | OUTPATIENT
Start: 2024-09-30

## 2024-09-30 RX ADMIN — CYANOCOBALAMIN 1000 MCG: 1000 INJECTION, SOLUTION INTRAMUSCULAR at 10:28

## 2024-09-30 ASSESSMENT — PAIN SCALES - GENERAL: PAINLEVEL: MILD PAIN (2)

## 2024-09-30 NOTE — PROGRESS NOTES
Assessment & Plan     High priority for 2019-nCoV vaccine  Given today.     Sicca syndrome (H)  No concerns no issues now with dry mouth.      MCI (mild cognitive impairment)  Beyond mild. She is on Exelon and will take the medications still at bedtime. She certainly is beyond mild.  Can't recall much of anything and Bina her sister is helping her today.  They are very happy with changes.  She is yawning the entire time in office.  Ritalin was given in a 10 mg Dose last month and Bina is not braxton if she takes this.   - rivastigmine (EXELON) 4.6 MG/24HR 24 hr patch; Place 1 patch over 24 hours onto the skin daily.    Hyperlipidemia LDL goal <70  Reviewed her labs and she is not taking her medications. Reviewed with caregiver the labs an I feel she should be taking her medication due to the large jump in this.   - atorvastatin (LIPITOR) 20 MG tablet; Take 1 tablet (20 mg) by mouth daily.    Attention and concentration deficit  Yawning, falling asleep ,   - methylphenidate (RITALIN) 10 MG tablet; Take 1 tablet (10 mg) by mouth 2 times daily.    Episode of recurrent major depressive disorder, unspecified depression episode severity (H)  No adjustments are needed.  We will be giving her a refill. She is happy.     - venlafaxine (EFFEXOR XR) 150 MG 24 hr capsule; Take 1 capsule (150 mg) by mouth daily.    Vitamin B12 deficiency disease  She is going to be given a shot in clinic then her sister will be giving a shot.   - Cyanocobalamin (B-12 COMPLIANCE INJECTION) 1000 MCG/ML KIT; Inject 1 mL as directed every 30 days.            See Patient Instructions    No follow-ups on file.    Dianne Friedman is a 68 year old, presenting for the following health issues:  Anxiety and Depression        9/30/2024     8:47 AM   Additional Questions   Roomed by Martin Kamara lpn   Accompanied by sister     History of Present Illness       Reason for visit:  Check up She is missing 1 dose(s) of medications per week.       Depression  and Anxiety   How are you doing with your depression since your last visit? No change  How are you doing with your anxiety since your last visit?  No change  Are you having other symptoms that might be associated with depression or anxiety? No  Have you had a significant life event? No   Do you have any concerns with your use of alcohol or other drugs? No    Social History     Tobacco Use    Smoking status: Former     Current packs/day: 0.00     Average packs/day: 0.3 packs/day for 1 year (0.3 ttl pk-yrs)     Types: Cigarettes     Start date:      Quit date:      Years since quittin.7     Passive exposure: Past    Smokeless tobacco: Never    Tobacco comments:     quit ; no passive exposure   Vaping Use    Vaping status: Never Used   Substance Use Topics    Alcohol use: Yes     Comment: 1 drink beer and wine daily    Drug use: No         2023     8:03 AM 1/10/2024     8:15 AM 2024     2:37 PM   PHQ   PHQ-9 Total Score 8 0 5   Q9: Thoughts of better off dead/self-harm past 2 weeks Not at all Not at all Not at all         2023     9:00 AM 2023     8:05 AM 2024     2:40 PM   BRADLY-7 SCORE   Total Score  0 (minimal anxiety) 0 (minimal anxiety)   Total Score 0 0 0         2024     2:37 PM   Last PHQ-9   1.  Little interest or pleasure in doing things 1   2.  Feeling down, depressed, or hopeless 0   3.  Trouble falling or staying asleep, or sleeping too much 3   4.  Feeling tired or having little energy 1   5.  Poor appetite or overeating 0   6.  Feeling bad about yourself 0   7.  Trouble concentrating 0   8.  Moving slowly or restless 0   Q9: Thoughts of better off dead/self-harm past 2 weeks 0   PHQ-9 Total Score 5         2024     2:40 PM   BRADLY-7    1. Feeling nervous, anxious, or on edge 0   2. Not being able to stop or control worrying 0   3. Worrying too much about different things 0   4. Trouble relaxing 0   5. Being so restless that it is hard to sit still 0   6.  Becoming easily annoyed or irritable 0   7. Feeling afraid, as if something awful might happen 0   BRADLY-7 Total Score 0   If you checked any problems, how difficult have they made it for you to do your work, take care of things at home, or get along with other people? Not difficult at all       Suicide Assessment Five-step Evaluation and Treatment (SAFE-T)        Review of Systems  Constitutional, HEENT, cardiovascular, pulmonary, gi and gu systems are negative, except as otherwise noted.      Objective    /80 (BP Location: Right arm, Patient Position: Sitting, Cuff Size: Adult Regular)   Pulse 76   Temp 98.9  F (37.2  C) (Tympanic)   Resp 16   Wt 66.6 kg (146 lb 14.4 oz)   SpO2 99%   BMI 24.45 kg/m    Body mass index is 24.45 kg/m .  Physical Exam   GENERAL: alert and no distress  EYES: Eyes grossly normal to inspection, PERRL and conjunctivae and sclerae normal  HENT: ear canals and TM's normal, nose and mouth without ulcers or lesions  NECK: no adenopathy, no asymmetry, masses, or scars  RESP: lungs clear to auscultation - no rales, rhonchi or wheezes  CV: regular rate and rhythm, normal S1 S2, no S3 or S4, no murmur, click or rub, no peripheral edema  ABDOMEN: soft, nontender, no hepatosplenomegaly, no masses and bowel sounds normal  MS: no gross musculoskeletal defects noted, no edema  SKIN: no suspicious lesions or rashes  NEURO: Normal strength and tone, mentation intact and speech normal  PSYCH: mentation appears normal, affect normal/bright    No results found for any visits on 09/30/24.        Signed Electronically by: MARCOS John

## 2024-09-30 NOTE — PROGRESS NOTES
RN CC talked with DEE Virk regarding patient.  PCP states advancing cognitive decline and lives alone. Sister Flores is assisting patient.  PCP had stated possibly needing resources and maybe medication payment assistance.  DEE Virk will be reaching out to patient.  No contact by JIMMIE BECK.  Adele Rashid RN  Care Coordination

## 2024-10-14 ENCOUNTER — MYC MEDICAL ADVICE (OUTPATIENT)
Dept: FAMILY MEDICINE | Facility: OTHER | Age: 69
End: 2024-10-14

## 2024-10-15 RX ORDER — CYANOCOBALAMIN 1000 UG/ML
INJECTION, SOLUTION INTRAMUSCULAR; SUBCUTANEOUS
COMMUNITY
Start: 2024-09-30 | End: 2024-10-25

## 2024-10-15 NOTE — PROGRESS NOTES
Assessment & Plan     Vitamin D deficiency disease  Refill.   - vitamin D3 (CHOLECALCIFEROL) 1.25 MG (45816 UT) capsule; Take 1 capsule (50,000 Units) by mouth every 7 days.    Cognitive decline  There has been no improvement except maybe more alertness. She is not falling asleep in office today at all. Which is good. I did tell her that her memory is declining faster than we would like it to.  Her Brother had advanced memory loss at 56 and seems Idalia is following his path.  Her memory is very poor. Can't recall if she is taking medications or not. Family is active in her care. Doesn't smoke pot any more . That was really complicating things. She is not ware that her memory is declining so fast.  Has an appointment with Dr. Aponte again in December.  Will be seeing Dr. Wallis for follow up now after today.    - methylphenidate (RITALIN LA) 30 MG 24 hr capsule; Take 1 capsule (30 mg) by mouth daily.  - methylphenidate (RITALIN LA) 30 MG 24 hr capsule; Take 1 capsule (30 mg) by mouth daily.  - methylphenidate (RITALIN LA) 30 MG 24 hr capsule; Take 1 capsule (30 mg) by mouth daily.  - donepezil (ARICEPT) 5 MG tablet; Take 1 tablet (5 mg) by mouth at bedtime.  - donepezil (ARICEPT) 10 MG tablet; Take 1 tablet (10 mg) by mouth at bedtime.    Attention and concentration deficit  Change from her current medication of Namenda .  Will remain on Exelon.  Might give an extra boost.  Keeping patch at 4.6    - donepezil (ARICEPT) 5 MG tablet; Take 1 tablet (5 mg) by mouth at bedtime.  - donepezil (ARICEPT) 10 MG tablet; Take 1 tablet (10 mg) by mouth at bedtime.    Hypersomnolence disorder, persistent  On the Namenda she was sleeping all day and night.  Had confusion as to day and night per her sister.     Memory loss  Start at 5 mg increase to 10 mg once  a day. Discussion on this with her and side effects.  Flores her sister is with her today.  Today Idalia is aware that she is getting worse, and she hasn't always been  compliant with medications and that is a huge issue.  We are going to try to keep medications to daily. Her insurance sometimes will dictate how we can do this however.   - donepezil (ARICEPT) 5 MG tablet; Take 1 tablet (5 mg) by mouth at bedtime.  - donepezil (ARICEPT) 10 MG tablet; Take 1 tablet (10 mg) by mouth at bedtime.            See Patient Instructions    No follow-ups on file.    Dianne Friedman is a 68 year old, presenting for the following health issues:  Lipids, Anxiety, and Depression        10/16/2024     8:58 AM   Additional Questions   Roomed by Helena Dhillon   Accompanied by sister         10/16/2024     8:58 AM   Patient Reported Additional Medications   Patient reports taking the following new medications none     History of Present Illness       Back Pain:  She presents for follow up of back pain. Patient's back pain is a new problem.    Original cause of back pain: lifting  First noticed back pain: yesterday  Patient feels back pain: comes and goesLocation of back pain:  Left lower back  Description of back pain: dull ache  Back pain spreads: nowhere    Since patient first noticed back pain, pain is: unchanged  Does back pain interfere with her job:  Not applicable  On a scale of 1-10 (10 being the worst), patient describes pain as:  1  What makes back pain worse: lying down   Acupuncture: not tried  Acetaminophen: not tried  Activity or exercise: helpful  Chiropractor:  Not tried  Cold: not tried  Massage: helpful  Muscle relaxants: not tried  NSAIDS: not tried  Opioids: not tried  Physical Therapy: not tried  Rest: not helpful  Steroid Injection: not tried  Stretching: not tried  Surgery: not tried  TENS unit: not tried  Topical pain relievers: not tried  Other healthcare providers patient is seeing for back pain: None    Mental Health Follow-up:  Patient presents to follow-up on Depression.Patient's depression since last visit has been:  Good  The patient is having other symptoms  "associated with depression.      Any significant life events: No  Patient is not feeling anxious or having panic attacks.  Patient has no concerns about alcohol or drug use.    Hyperlipidemia:  She presents for follow up of hyperlipidemia.   She is taking medication to lower cholesterol. She is not having myalgia or other side effects to statin medications.    Hypothyroidism:     Since last visit, patient describes the following symptoms::  Fatigue    She eats 2-3 servings of fruits and vegetables daily.She consumes 1 sweetened beverage(s) daily. She exercises with enough effort to increase her heart rate 3 or less days per week.   She is taking medications regularly.       Depression and Anxiety   How are you doing with your depression since your last visit? Improved \"feel good\"  How are you doing with your anxiety since your last visit?  No change  Are you having other symptoms that might be associated with depression or anxiety? No  Have you had a significant life event? No   Do you have any concerns with your use of alcohol or other drugs? No    Social History     Tobacco Use    Smoking status: Former     Current packs/day: 0.00     Average packs/day: 0.3 packs/day for 1 year (0.3 ttl pk-yrs)     Types: Cigarettes     Start date:      Quit date:      Years since quittin.8     Passive exposure: Past    Smokeless tobacco: Never    Tobacco comments:     quit ; no passive exposure   Vaping Use    Vaping status: Never Used   Substance Use Topics    Alcohol use: Yes     Comment: 1 drink beer and wine daily    Drug use: No         2023     8:03 AM 1/10/2024     8:15 AM 2024     2:37 PM   PHQ   PHQ-9 Total Score 8 0 5   Q9: Thoughts of better off dead/self-harm past 2 weeks Not at all Not at all Not at all         2023     9:00 AM 2023     8:05 AM 2024     2:40 PM   BRADLY-7 SCORE   Total Score  0 (minimal anxiety) 0 (minimal anxiety)   Total Score 0 0 0         2024     2:37 " "PM   Last PHQ-9   1.  Little interest or pleasure in doing things 1   2.  Feeling down, depressed, or hopeless 0   3.  Trouble falling or staying asleep, or sleeping too much 3   4.  Feeling tired or having little energy 1   5.  Poor appetite or overeating 0   6.  Feeling bad about yourself 0   7.  Trouble concentrating 0   8.  Moving slowly or restless 0   Q9: Thoughts of better off dead/self-harm past 2 weeks 0   PHQ-9 Total Score 5         8/29/2024     2:40 PM   BRADLY-7    1. Feeling nervous, anxious, or on edge 0   2. Not being able to stop or control worrying 0   3. Worrying too much about different things 0   4. Trouble relaxing 0   5. Being so restless that it is hard to sit still 0   6. Becoming easily annoyed or irritable 0   7. Feeling afraid, as if something awful might happen 0   BRADLY-7 Total Score 0   If you checked any problems, how difficult have they made it for you to do your work, take care of things at home, or get along with other people? Not difficult at all       Suicide Assessment Five-step Evaluation and Treatment (SAFE-T)          Review of Systems  Constitutional, HEENT, cardiovascular, pulmonary, gi and gu systems are negative, except as otherwise noted.      Objective    /82 (BP Location: Right arm, Patient Position: Sitting, Cuff Size: Adult Regular)   Pulse 86   Temp 99.2  F (37.3  C) (Tympanic)   Resp 20   Ht 1.651 m (5' 5\")   Wt 67.4 kg (148 lb 8 oz)   SpO2 98%   BMI 24.71 kg/m    Body mass index is 24.71 kg/m .  Physical Exam   GENERAL: alert and no distress  NECK: no adenopathy, no asymmetry, masses, or scars  RESP: lungs clear to auscultation - no rales, rhonchi or wheezes  CV: regular rate and rhythm, normal S1 S2, no S3 or S4, no murmur, click or rub, no peripheral edema  MS: no gross musculoskeletal defects noted, no edema  PSYCH: mentation appears normal and affect normal/bright, not aware of day , knows me but can't recall my name very often and knows her sisters " name I believe but calls her sis.  Discussion on decline of memory. How rapid it is becoming. Her physical health is excellent, her anxiety seems to be ramping a little but not sever today as we are discussing her course of care.     Lab on 09/04/2024   Component Date Value Ref Range Status    Color Urine 09/04/2024 Light Yellow  Colorless, Straw, Light Yellow, Yellow Final    Appearance Urine 09/04/2024 Clear  Clear Final    Glucose Urine 09/04/2024 Negative  Negative mg/dL Final    Bilirubin Urine 09/04/2024 Negative  Negative Final    Ketones Urine 09/04/2024 Negative  Negative mg/dL Final    Specific Gravity Urine 09/04/2024 1.017  1.003 - 1.035 Final    Blood Urine 09/04/2024 Negative  Negative Final    pH Urine 09/04/2024 7.0  4.7 - 8.0 Final    Protein Albumin Urine 09/04/2024 Negative  Negative mg/dL Final    Urobilinogen Urine 09/04/2024 Normal  Normal, 2.0 mg/dL Final    Nitrite Urine 09/04/2024 Negative  Negative Final    Leukocyte Esterase Urine 09/04/2024 Negative  Negative Final    Mucus Urine 09/04/2024 Present (A)  None Seen /LPF Final    RBC Urine 09/04/2024 <1  <=2 /HPF Final    WBC Urine 09/04/2024 <1  <=5 /HPF Final    Squamous Epithelials Urine 09/04/2024 0  <=1 /HPF Final    Sodium 09/04/2024 140  135 - 145 mmol/L Final    Potassium 09/04/2024 4.1  3.4 - 5.3 mmol/L Final    Carbon Dioxide (CO2) 09/04/2024 23  22 - 29 mmol/L Final    Anion Gap 09/04/2024 13  7 - 15 mmol/L Final    Urea Nitrogen 09/04/2024 9.5  8.0 - 23.0 mg/dL Final    Creatinine 09/04/2024 0.85  0.51 - 0.95 mg/dL Final    GFR Estimate 09/04/2024 75  >60 mL/min/1.73m2 Final    eGFR calculated using 2021 CKD-EPI equation.    Calcium 09/04/2024 9.4  8.8 - 10.4 mg/dL Final    Reference intervals for this test were updated on 7/16/2024 to reflect our healthy population more accurately. There may be differences in the flagging of prior results with similar values performed with this method. Those prior results can be interpreted in  the context of the updated reference intervals.    Chloride 09/04/2024 104  98 - 107 mmol/L Final    Glucose 09/04/2024 112 (H)  70 - 99 mg/dL Final    Alkaline Phosphatase 09/04/2024 73  40 - 150 U/L Final    AST 09/04/2024 20  0 - 45 U/L Final    ALT 09/04/2024 19  0 - 50 U/L Final    Protein Total 09/04/2024 7.2  6.4 - 8.3 g/dL Final    Albumin 09/04/2024 4.2  3.5 - 5.2 g/dL Final    Bilirubin Total 09/04/2024 0.4  <=1.2 mg/dL Final    Patient Fasting > 8hrs? 09/04/2024 Yes   Final    Cholesterol 09/04/2024 266 (H)  <200 mg/dL Final    Triglycerides 09/04/2024 131  <150 mg/dL Final    Direct Measure HDL 09/04/2024 58  >=50 mg/dL Final    LDL Cholesterol Calculated 09/04/2024 182 (H)  <=100 mg/dL Final    Non HDL Cholesterol 09/04/2024 208 (H)  <130 mg/dL Final    Patient Fasting > 8hrs? 09/04/2024 Yes   Final    WBC Count 09/04/2024 5.3  4.0 - 11.0 10e3/uL Final    RBC Count 09/04/2024 4.13  3.80 - 5.20 10e6/uL Final    Hemoglobin 09/04/2024 13.3  11.7 - 15.7 g/dL Final    Hematocrit 09/04/2024 39.4  35.0 - 47.0 % Final    MCV 09/04/2024 95  78 - 100 fL Final    MCH 09/04/2024 32.2  26.5 - 33.0 pg Final    MCHC 09/04/2024 33.8  31.5 - 36.5 g/dL Final    RDW 09/04/2024 13.7  10.0 - 15.0 % Final    Platelet Count 09/04/2024 290  150 - 450 10e3/uL Final    % Neutrophils 09/04/2024 51  % Final    % Lymphocytes 09/04/2024 35  % Final    % Monocytes 09/04/2024 8  % Final    % Eosinophils 09/04/2024 5  % Final    % Basophils 09/04/2024 1  % Final    % Immature Granulocytes 09/04/2024 0  % Final    NRBCs per 100 WBC 09/04/2024 0  <1 /100 Final    Absolute Neutrophils 09/04/2024 2.7  1.6 - 8.3 10e3/uL Final    Absolute Lymphocytes 09/04/2024 1.9  0.8 - 5.3 10e3/uL Final    Absolute Monocytes 09/04/2024 0.5  0.0 - 1.3 10e3/uL Final    Absolute Eosinophils 09/04/2024 0.3  0.0 - 0.7 10e3/uL Final    Absolute Basophils 09/04/2024 0.0  0.0 - 0.2 10e3/uL Final    Absolute Immature Granulocytes 09/04/2024 0.0  <=0.4 10e3/uL  Final    Absolute NRBCs 09/04/2024 0.0  10e3/uL Final     No results found for any visits on 10/16/24.        Signed Electronically by: MARCOS John

## 2024-10-16 ENCOUNTER — CARE COORDINATION (OUTPATIENT)
Dept: FAMILY MEDICINE | Facility: OTHER | Age: 69
End: 2024-10-16

## 2024-10-16 ENCOUNTER — OFFICE VISIT (OUTPATIENT)
Dept: FAMILY MEDICINE | Facility: OTHER | Age: 69
End: 2024-10-16
Attending: PHYSICIAN ASSISTANT
Payer: COMMERCIAL

## 2024-10-16 ENCOUNTER — TELEPHONE (OUTPATIENT)
Dept: FAMILY MEDICINE | Facility: OTHER | Age: 69
End: 2024-10-16

## 2024-10-16 VITALS
SYSTOLIC BLOOD PRESSURE: 124 MMHG | HEIGHT: 65 IN | DIASTOLIC BLOOD PRESSURE: 82 MMHG | RESPIRATION RATE: 20 BRPM | TEMPERATURE: 99.2 F | WEIGHT: 148.5 LBS | OXYGEN SATURATION: 98 % | HEART RATE: 86 BPM | BODY MASS INDEX: 24.74 KG/M2

## 2024-10-16 DIAGNOSIS — R41.840 ATTENTION AND CONCENTRATION DEFICIT: ICD-10-CM

## 2024-10-16 DIAGNOSIS — G47.10 HYPERSOMNOLENCE DISORDER, PERSISTENT: ICD-10-CM

## 2024-10-16 DIAGNOSIS — E55.9 VITAMIN D DEFICIENCY DISEASE: ICD-10-CM

## 2024-10-16 DIAGNOSIS — R41.89 COGNITIVE DECLINE: Primary | ICD-10-CM

## 2024-10-16 DIAGNOSIS — R41.3 MEMORY LOSS: ICD-10-CM

## 2024-10-16 PROCEDURE — G0008 ADMIN INFLUENZA VIRUS VAC: HCPCS

## 2024-10-16 PROCEDURE — 99214 OFFICE O/P EST MOD 30 MIN: CPT | Performed by: PHYSICIAN ASSISTANT

## 2024-10-16 PROCEDURE — G0463 HOSPITAL OUTPT CLINIC VISIT: HCPCS | Mod: 25

## 2024-10-16 RX ORDER — DONEPEZIL HYDROCHLORIDE 10 MG/1
10 TABLET, FILM COATED ORAL AT BEDTIME
Qty: 30 TABLET | Refills: 1 | Status: SHIPPED | OUTPATIENT
Start: 2024-11-14

## 2024-10-16 RX ORDER — METHYLPHENIDATE HYDROCHLORIDE 30 MG/1
30 CAPSULE, EXTENDED RELEASE ORAL DAILY
Qty: 30 CAPSULE | Refills: 0 | Status: SHIPPED | OUTPATIENT
Start: 2024-11-15 | End: 2024-12-15

## 2024-10-16 RX ORDER — DONEPEZIL HYDROCHLORIDE 5 MG/1
5 TABLET, FILM COATED ORAL AT BEDTIME
Qty: 30 TABLET | Refills: 0 | Status: SHIPPED | OUTPATIENT
Start: 2024-10-16

## 2024-10-16 RX ORDER — METHYLPHENIDATE HYDROCHLORIDE 30 MG/1
30 CAPSULE, EXTENDED RELEASE ORAL DAILY
Qty: 30 CAPSULE | Refills: 0 | Status: SHIPPED | OUTPATIENT
Start: 2024-10-16 | End: 2024-11-15

## 2024-10-16 RX ORDER — METHYLPHENIDATE HYDROCHLORIDE 30 MG/1
30 CAPSULE, EXTENDED RELEASE ORAL DAILY
Qty: 30 CAPSULE | Refills: 0 | Status: SHIPPED | OUTPATIENT
Start: 2024-12-15 | End: 2025-01-14

## 2024-10-16 ASSESSMENT — PAIN SCALES - GENERAL: PAINLEVEL: NO PAIN (0)

## 2024-10-16 NOTE — PROGRESS NOTES
Met with patient and her sister today before the PCP visit.  We discussed patient memory and a tool which might be helpful - a medication dispensing machine.  Sister could fill the machine and then it would dispense the medication when it was time and beep til acknowledged.  If the medication is not taken, it would spin it away with the next dose and it could be tracked.  [employer] has these types of machines and can assist in getting them,    We discussed the Medicare Savings plans as from a monthly income perspective patient would qualify for , but from an asset perspective, patient does not qualify at this time.  When assets  are spend down, patient should apply at the [facility] to have them pay her medicare premium for her.    Also discussed with patient and sister the importance for socialization and doing things to keep the brain active.    Sister has this SW contact info and will call with any questions.

## 2024-10-16 NOTE — LETTER
My Depression Action Plan  Name: Idalia Hoang   Date of Birth 1955  Date: 10/16/2024    My doctor: Love Noriega   My clinic: [facility] [place]  [address] [place] [address]  [phone]            GREEN    ZONE   Good Control    What it looks like:   Things are going generally well. You have normal ups and downs. You may even feel depressed from time to time, but bad moods usually last less than a day.   What you need to do:  Continue to care for yourself (see self care plan)  Check your depression survival kit and update it as needed  Follow your physician s recommendations including any medication.  Do not stop taking medication unless you consult with your physician first.             YELLOW         ZONE Getting Worse    What it looks like:   Depression is starting to interfere with your life.   It may be hard to get out of bed; you may be starting to isolate yourself from others.  Symptoms of depression are starting to last most all day and this has happened for several days.   You may have suicidal thoughts but they are not constant.   What you need to do:     Call your care team. Your response to treatment will improve if you keep your care team informed of your progress. Yellow periods are signs an adjustment may need to be made.     Continue your self-care.  Just get dressed and ready for the day.  Don't give yourself time to talk yourself out of it.    Talk to someone in your support network.    Open up your Depression Self-Care Plan/Wellness Kit.             RED    ZONE Medical Alert - Get Help    What it looks like:   Depression is seriously interfering with your life.   You may experience these or other symptoms: You can t get out of bed most days, can t work or engage in other necessary activities, you have trouble taking care of basic hygiene, or basic responsibilities, thoughts of suicide or death that will not go away, self-injurious behavior.     What  you need to do:  Call your care team and request a same-day appointment. If they are not available (weekends or after hours) call your local crisis line, emergency room or 911.          Depression Self-Care Plan / Wellness Kit    Many people find that medication and therapy are helpful treatments for managing depression. In addition, making small changes to your everyday life can help to boost your mood and improve your wellbeing. Below are some tips for you to consider. Be sure to talk with your medical provider and/or behavioral health consultant if your symptoms are worsening or not improving.     Sleep   Sleep hygiene  means all of the habits that support good, restful sleep. It includes maintaining a consistent bedtime and wake time, using your bedroom only for sleeping or sex, and keeping the bedroom dark and free of distractions like a computer, smartphone, or television.     Develop a Healthy Routine  Maintain good hygiene. Get out of bed in the morning, make your bed, brush your teeth, take a shower, and get dressed. Don t spend too much time viewing media that makes you feel stressed. Find time to relax each day.    Exercise  Get some form of exercise every day. This will help reduce pain and release endorphins, the  feel good  chemicals in your brain. It can be as simple as just going for a walk or doing some gardening, anything that will get you moving.      Diet  Strive to eat healthy foods, including fruits and vegetables. Drink plenty of water. Avoid excessive sugar, caffeine, alcohol, and other mood-altering substances.     Stay Connected with Others  Stay in touch with friends and family members.    Manage Your Mood  Try deep breathing, massage therapy, biofeedback, or meditation. Take part in fun activities when you can. Try to find something to smile about each day.     Psychotherapy  Be open to working with a therapist if your provider recommends it.     Medication  Be sure to take your  medication as prescribed. Most anti-depressants need to be taken every day. It usually takes several weeks for medications to work. Not all medicines work for all people. It is important to follow-up with your provider to make sure you have a treatment plan that is working for you. Do not stop your medication abruptly without first discussing it with your provider.    Crisis Resources   These hotlines are for both adults and children. They and are open 24 hours a day, 7 days a week unless noted otherwise.    National Suicide Prevention Lifeline   988 or 7-922-766-WUXM (6735)    Crisis Text Line    www.crisistextline.org  Text HOME to 769751 from anywhere in the [place], anytime, about any type of crisis. A live, trained crisis counselor will receive the text and respond quickly.    [place] Lifeline for LGBTQ Youth  A national crisis intervention and suicide lifeline for LGBTQ youth under 25. Provides a safe place to talk without judgement. Call 1-254.869.5239; text START to 546897 or visit www.theMyMundusvorproject.org to talk to a trained counselor.    For Counts include 234 beds at the [facility] crisis numbers, visit the [facility] website at:  https://[place].gov/dhs/people-we-serve/adults/health-care/mental-health/resources/crisis-contacts.jsp

## 2024-10-16 NOTE — TELEPHONE ENCOUNTER
Idalia was seen today by Hari and at check out we were scheduling a memory loss visit with you per Love.  Your next available is Jan 21st and they were hoping to be seen prior to that due to the symptoms.  Are you able to see her prior to January?  Consent to communicate on file for sister Bina [phone] she asked to be called if patient doesn't answer.

## 2024-10-24 NOTE — PROGRESS NOTES
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 Neuropsych testing already scheduled, will keep this appt. Pt is on ssri as well as stimulant (? Undx adhd per family history). Given the significant family history, particularly with her brother also diagnosed early, recommend neurology referral to [facility] (brother also referred there). She is already on rivastigmine patch and aricept (currently at 5mg, but set to increase to 10mg) per prior to PCP. No changes made today. Family has stepped in and helping with med management. Pt has self limited her driving, family in support of this. Agree with this limitation, we discussed OT Driving evaluation and that I will likely request this in the future as done for most of my patients with memory loss, she would be open to this. She is cooking less, but still able to put together mildly complicated recipes.   - aspirin 81 MG EC tablet  Dispense: 90 tablet; Refill: 3  - Adult Neurology  Referral    Episode of recurrent major depressive disorder, unspecified depression episode severity (H)  Mood appears stable. Sleep is good.     Acquired hypothyroidism  Stable, last lab 10/25  - TSH with free T4 reflex    Age-related osteoporosis without current pathological fracture  Noted, will update secondary labs. She is on fosamax, however, compliance has been a concern in the past. We discussed possible shift to reclast. Both pt and family would be insupport of this. Complete fosamax rx and will order at follow up  - Phosphorus  - Protein electrophoresis  - ESR: Erythrocyte sedimentation rate  - Hepatic panel (Albumin, ALT, AST, Bili, Alk Phos, TP)  - Basic metabolic panel  - Parathyroid Hormone Intact  - Vitamin D Deficiency  - Phosphorus  - Protein electrophoresis  - ESR: Erythrocyte sedimentation rate  - Hepatic panel (Albumin, ALT, AST, Bili, Alk Phos, TP)  -  Basic metabolic panel  - Parathyroid Hormone Intact  - Vitamin D Deficiency      The longitudinal plan of care for the diagnosis(es)/condition(s) as documented were addressed during this visit. Due to the added complexity in care, I will continue to support Idalia in the subsequent management and with ongoing continuity of care.    No follow-ups on file.    Dianne Friedman is a 68 year old, presenting for the following health issues:  Memory Loss        10/25/2024     8:20 AM   Additional Questions   Roomed by Sully   Accompanied by sister Bina     CHIQUIS     Concern - Memory issues  Onset: 1 year   Description: memory   Intensity: moderate  Progression of Symptoms:  worsening  Accompanying Signs & Symptoms: short term memory issues, trouble with time, gets stressed easily    Previous history of similar problem: yes   Precipitating factors:        Worsened by: NA  Alleviating factors:        Improved by: NA  Therapies tried and outcome: aricept, exelon.     - family first noted memory changes several years ago. Of note, family indicates, pt always demonstrated some attention or executive function deficits since childhood.     - Mood is currently very good. Function seems to be improved now on the ADHD medications.     - Getting b12 injections, done at home.     - Had one episode concerning to family where she was concerned about someone stealing from her. This is unlike her. Has not had further issues with paranoia, hallucinations, delusions.     - No neurologic sx such as tremor, changes in movement, gait, falls, vision changes, speech changes (rate, volume, etc).     - No known deficits in hearing or visions.     - Sleeping well, unclear if she snores.     - Mother dx with Alz disease at age 70, brother with early onset Alz Disease as well. Two additional maternal uncles with disease. Father with vascular dementia.     - Tolerating aricept and exelon patch well, unclear if helpful for memory    - First evaluation  with Neuropsych 12/2023, Impression below:  SUMMARY AND IMPRESSIONS: Ms. Hoang is a 68-year-old, right-handed woman who was referred by neurology for evaluation of memory concerns in the context of a strong family history of dementia. On neuropsychological testing, she performed within the broad context of normal across most tasks, but exhibited weaknesses in areas of memory. This deficit is beyond expectation for her age and reflect an abnormal neuropsychological profile. The severity of deficits is mild. As she remains independent in activities of daily living, a diagnosis of mild cognitive impairment appears most appropriate. With respect etiology, her family medical history and neuropsychological test profile places her in the moderate risk group for progression to dementia.      DIAGNOSTIC IMPRESSION:    Mild cognitive impairment   Family history of Alzheimer's disease         Review of Systems  Constitutional, neuro, ENT, endocrine, pulmonary, cardiac, gastrointestinal, genitourinary, musculoskeletal, integument and psychiatric systems are negative, except as otherwise noted.      Objective    /84   Pulse 80   Temp 98  F (36.7  C) (Tympanic)   Wt 66.7 kg (147 lb)   SpO2 99%   BMI 24.46 kg/m    Body mass index is 24.46 kg/m .  Physical Exam   GENERAL: alert and no distress  EYES: Eyes grossly normal to inspection  NECK: no adenopathy, no asymmetry, masses, or scars  RESP: lungs clear to auscultation - no rales, rhonchi or wheezes  CV: regular rate and rhythm, normal S1 S2, no S3 or S4, no murmur, click or rub, no peripheral edema  ABDOMEN: soft, nontender, no hepatosplenomegaly, no masses and bowel sounds normal  MS: no gross musculoskeletal defects noted, no edema  SKIN: no suspicious lesions or rashes  NEURO: Normal strength and tone, memory variable to past events, occasionally seems to recall details of appts and schedules, other times does not and speech normal, neuro exam non focal  PSYCH:  mentation appears normal, affect normal/bright    Results for orders placed or performed in visit on 10/25/24   Phosphorus     Status: Normal   Result Value Ref Range    Phosphorus 3.7 2.5 - 4.5 mg/dL   ESR: Erythrocyte sedimentation rate     Status: Normal   Result Value Ref Range    Erythrocyte Sedimentation Rate 7 0 - 30 mm/hr   Hepatic panel (Albumin, ALT, AST, Bili, Alk Phos, TP)     Status: Normal   Result Value Ref Range    Protein Total 7.4 6.4 - 8.3 g/dL    Albumin 4.6 3.5 - 5.2 g/dL    Bilirubin Total 0.4 <=1.2 mg/dL    Alkaline Phosphatase 66 40 - 150 U/L    AST 20 0 - 45 U/L    ALT 20 0 - 50 U/L    Bilirubin Direct <0.20 0.00 - 0.30 mg/dL   Basic metabolic panel     Status: Normal   Result Value Ref Range    Sodium 140 135 - 145 mmol/L    Potassium 4.2 3.4 - 5.3 mmol/L    Chloride 104 98 - 107 mmol/L    Carbon Dioxide (CO2) 24 22 - 29 mmol/L    Anion Gap 12 7 - 15 mmol/L    Urea Nitrogen 10.5 8.0 - 23.0 mg/dL    Creatinine 0.77 0.51 - 0.95 mg/dL    GFR Estimate 84 >60 mL/min/1.73m2    Calcium 9.4 8.8 - 10.4 mg/dL    Glucose 95 70 - 99 mg/dL   Parathyroid Hormone Intact     Status: Normal   Result Value Ref Range    Parathyroid Hormone Intact 30 15 - 65 pg/mL    Narrative    This result was obtained with the Roche Elecsys PTH STAT assay.   This reference range differs from PTH assays used in other [facility] laboratories.   Vitamin D Deficiency     Status: Abnormal   Result Value Ref Range    Vitamin D, Total (25-Hydroxy) 112 (H) 20 - 50 ng/mL    Narrative    Season, race, dietary intake, and treatment affect the concentration of 25-hydroxy-Vitamin D. Values may decrease during winter months and increase during summer months.    Vitamin D determination is routinely performed by an immunoassay specific for 25 hydroxyvitamin D3.  If an individual is on vitamin D2(ergocalciferol) supplementation, please specify 25 OH vitamin D2 and D3 level determination by LCMSMS test VITD23.     Total Protein, Serum  for ELP     Status: Normal   Result Value Ref Range    Total Protein Serum for ELP 7.2 6.4 - 8.3 g/dL   Protein Electrophoresis, Serum     Status: None   Result Value Ref Range    Albumin 4.5 3.7 - 5.1 g/dL    Alpha 1 0.3 0.2 - 0.4 g/dL    Alpha 2 0.8 0.5 - 0.9 g/dL    Beta Globulin 0.8 0.6 - 1.0 g/dL    Gamma Globulin 0.9 0.7 - 1.6 g/dL    Monoclonal Peak 0.0 <=0.0 g/dL    ELP Interpretation       Essentially normal electrophoretic pattern. No obvious monoclonal proteins seen. Pathologic significance requires clinical correlation. Jessica Wade M.D., Ph.D.    Signout Location if Remote [facility]    Protein electrophoresis     Status: None    Narrative    The following orders were created for panel order Protein electrophoresis.  Procedure                               Abnormality         Status                     ---------                               -----------         ------                     Total Protein, Serum for...[[id number]]  Normal              Final result               Protein Electrophoresis,...[[id number]]                      Final result                 Please view results for these tests on the individual orders.           Signed Electronically by: Chey Wallis MD

## 2024-10-25 ENCOUNTER — OFFICE VISIT (OUTPATIENT)
Dept: FAMILY MEDICINE | Facility: OTHER | Age: 69
End: 2024-10-25
Attending: FAMILY MEDICINE
Payer: COMMERCIAL

## 2024-10-25 VITALS
DIASTOLIC BLOOD PRESSURE: 84 MMHG | WEIGHT: 147 LBS | OXYGEN SATURATION: 99 % | TEMPERATURE: 98 F | HEART RATE: 80 BPM | SYSTOLIC BLOOD PRESSURE: 118 MMHG | BODY MASS INDEX: 24.46 KG/M2

## 2024-10-25 DIAGNOSIS — M81.0 AGE-RELATED OSTEOPOROSIS WITHOUT CURRENT PATHOLOGICAL FRACTURE: ICD-10-CM

## 2024-10-25 DIAGNOSIS — F33.9 EPISODE OF RECURRENT MAJOR DEPRESSIVE DISORDER, UNSPECIFIED DEPRESSION EPISODE SEVERITY (H): ICD-10-CM

## 2024-10-25 DIAGNOSIS — F02.A0 MILD EARLY ONSET ALZHEIMER'S DEMENTIA WITHOUT BEHAVIORAL DISTURBANCE, PSYCHOTIC DISTURBANCE, MOOD DISTURBANCE, OR ANXIETY (H): Primary | ICD-10-CM

## 2024-10-25 DIAGNOSIS — G30.0 MILD EARLY ONSET ALZHEIMER'S DEMENTIA WITHOUT BEHAVIORAL DISTURBANCE, PSYCHOTIC DISTURBANCE, MOOD DISTURBANCE, OR ANXIETY (H): Primary | ICD-10-CM

## 2024-10-25 DIAGNOSIS — E03.9 ACQUIRED HYPOTHYROIDISM: ICD-10-CM

## 2024-10-25 LAB
ALBUMIN SERPL BCG-MCNC: 4.6 G/DL (ref 3.5–5.2)
ALP SERPL-CCNC: 66 U/L (ref 40–150)
ALT SERPL W P-5'-P-CCNC: 20 U/L (ref 0–50)
ANION GAP SERPL CALCULATED.3IONS-SCNC: 12 MMOL/L (ref 7–15)
AST SERPL W P-5'-P-CCNC: 20 U/L (ref 0–45)
BILIRUB DIRECT SERPL-MCNC: <0.2 MG/DL (ref 0–0.3)
BILIRUB SERPL-MCNC: 0.4 MG/DL
BUN SERPL-MCNC: 10.5 MG/DL (ref 8–23)
CALCIUM SERPL-MCNC: 9.4 MG/DL (ref 8.8–10.4)
CHLORIDE SERPL-SCNC: 104 MMOL/L (ref 98–107)
CREAT SERPL-MCNC: 0.77 MG/DL (ref 0.51–0.95)
EGFRCR SERPLBLD CKD-EPI 2021: 84 ML/MIN/1.73M2
ERYTHROCYTE [SEDIMENTATION RATE] IN BLOOD BY WESTERGREN METHOD: 7 MM/HR (ref 0–30)
GLUCOSE SERPL-MCNC: 95 MG/DL (ref 70–99)
HCO3 SERPL-SCNC: 24 MMOL/L (ref 22–29)
PHOSPHATE SERPL-MCNC: 3.7 MG/DL (ref 2.5–4.5)
POTASSIUM SERPL-SCNC: 4.2 MMOL/L (ref 3.4–5.3)
PROT SERPL-MCNC: 7.4 G/DL (ref 6.4–8.3)
SODIUM SERPL-SCNC: 140 MMOL/L (ref 135–145)
TOTAL PROTEIN SERUM FOR ELP: 7.2 G/DL (ref 6.4–8.3)
TSH SERPL DL<=0.005 MIU/L-ACNC: 0.8 UIU/ML (ref 0.3–4.2)

## 2024-10-25 PROCEDURE — 84165 PROTEIN E-PHORESIS SERUM: CPT | Mod: ZL | Performed by: STUDENT IN AN ORGANIZED HEALTH CARE EDUCATION/TRAINING PROGRAM

## 2024-10-25 PROCEDURE — G0463 HOSPITAL OUTPT CLINIC VISIT: HCPCS | Mod: 25

## 2024-10-25 PROCEDURE — 84443 ASSAY THYROID STIM HORMONE: CPT | Mod: ZL

## 2024-10-25 PROCEDURE — 84155 ASSAY OF PROTEIN SERUM: CPT | Mod: ZL | Performed by: FAMILY MEDICINE

## 2024-10-25 PROCEDURE — G2211 COMPLEX E/M VISIT ADD ON: HCPCS | Performed by: FAMILY MEDICINE

## 2024-10-25 PROCEDURE — 84165 PROTEIN E-PHORESIS SERUM: CPT | Mod: 26 | Performed by: PATHOLOGY

## 2024-10-25 PROCEDURE — 99214 OFFICE O/P EST MOD 30 MIN: CPT | Performed by: FAMILY MEDICINE

## 2024-10-25 PROCEDURE — 82306 VITAMIN D 25 HYDROXY: CPT | Mod: ZL | Performed by: FAMILY MEDICINE

## 2024-10-25 PROCEDURE — 82248 BILIRUBIN DIRECT: CPT | Mod: ZL | Performed by: FAMILY MEDICINE

## 2024-10-25 PROCEDURE — 85652 RBC SED RATE AUTOMATED: CPT | Mod: ZL | Performed by: FAMILY MEDICINE

## 2024-10-25 PROCEDURE — 90677 PCV20 VACCINE IM: CPT

## 2024-10-25 PROCEDURE — 36415 COLL VENOUS BLD VENIPUNCTURE: CPT | Mod: ZL | Performed by: FAMILY MEDICINE

## 2024-10-25 PROCEDURE — 83970 ASSAY OF PARATHORMONE: CPT | Mod: ZL | Performed by: FAMILY MEDICINE

## 2024-10-25 PROCEDURE — 84100 ASSAY OF PHOSPHORUS: CPT | Mod: ZL | Performed by: FAMILY MEDICINE

## 2024-10-25 RX ORDER — ASPIRIN 81 MG/1
81 TABLET ORAL DAILY
Qty: 90 TABLET | Refills: 3 | Status: SHIPPED | OUTPATIENT
Start: 2024-10-25

## 2024-10-25 ASSESSMENT — PAIN SCALES - GENERAL: PAINLEVEL_OUTOF10: NO PAIN (0)

## 2024-10-26 LAB
PTH-INTACT SERPL-MCNC: 30 PG/ML (ref 15–65)
VIT D+METAB SERPL-MCNC: 112 NG/ML (ref 20–50)

## 2024-10-28 LAB
ALBUMIN SERPL ELPH-MCNC: 4.5 G/DL (ref 3.7–5.1)
ALPHA1 GLOB SERPL ELPH-MCNC: 0.3 G/DL (ref 0.2–0.4)
ALPHA2 GLOB SERPL ELPH-MCNC: 0.8 G/DL (ref 0.5–0.9)
B-GLOBULIN SERPL ELPH-MCNC: 0.8 G/DL (ref 0.6–1)
GAMMA GLOB SERPL ELPH-MCNC: 0.9 G/DL (ref 0.7–1.6)
LOCATION OF TASK: NORMAL
M PROTEIN SERPL ELPH-MCNC: 0 G/DL
PROT PATTERN SERPL ELPH-IMP: NORMAL

## 2024-12-20 ENCOUNTER — OFFICE VISIT (OUTPATIENT)
Dept: FAMILY MEDICINE | Facility: OTHER | Age: 69
End: 2024-12-20
Attending: FAMILY MEDICINE
Payer: COMMERCIAL

## 2024-12-20 VITALS
HEART RATE: 80 BPM | RESPIRATION RATE: 18 BRPM | WEIGHT: 145.9 LBS | DIASTOLIC BLOOD PRESSURE: 78 MMHG | SYSTOLIC BLOOD PRESSURE: 122 MMHG | OXYGEN SATURATION: 97 % | HEIGHT: 65 IN | TEMPERATURE: 98.7 F | BODY MASS INDEX: 24.31 KG/M2

## 2024-12-20 DIAGNOSIS — F33.9 EPISODE OF RECURRENT MAJOR DEPRESSIVE DISORDER, UNSPECIFIED DEPRESSION EPISODE SEVERITY (H): ICD-10-CM

## 2024-12-20 DIAGNOSIS — F02.80 ALZHEIMER'S DISEASE (H): Primary | ICD-10-CM

## 2024-12-20 DIAGNOSIS — M81.0 AGE RELATED OSTEOPOROSIS, UNSPECIFIED PATHOLOGICAL FRACTURE PRESENCE: ICD-10-CM

## 2024-12-20 DIAGNOSIS — F90.0 ATTENTION DEFICIT HYPERACTIVITY DISORDER (ADHD), PREDOMINANTLY INATTENTIVE TYPE: ICD-10-CM

## 2024-12-20 DIAGNOSIS — G30.9 ALZHEIMER'S DISEASE (H): Primary | ICD-10-CM

## 2024-12-20 DIAGNOSIS — E03.9 ACQUIRED HYPOTHYROIDISM: ICD-10-CM

## 2024-12-20 PROCEDURE — G2211 COMPLEX E/M VISIT ADD ON: HCPCS | Performed by: FAMILY MEDICINE

## 2024-12-20 PROCEDURE — G0463 HOSPITAL OUTPT CLINIC VISIT: HCPCS

## 2024-12-20 PROCEDURE — 99213 OFFICE O/P EST LOW 20 MIN: CPT | Performed by: FAMILY MEDICINE

## 2024-12-20 NOTE — PROGRESS NOTES
Assessment & Plan     Alzheimer's disease (H)  Reviewed neuropsychology notes. Pt and family interested in further evaluation for lecanemab. We discussed some of the risk/benefits of this medication, however, deferred much of these to Neurology who they are seeing soon. Given her age and overall general good health, I would be in support of this if she qualifies. Reviewed her medications today. She is tolerating both aricept and rivastigmine, however, this is somewhat redundant treatment. Unclear if any additional benefit. She is forgetting the patch on occasion, so recommend holding on that. Okay to discuss with neurology further. She does have some GI complaints, unclear if they correlate with the addition of these medications, but consider transition back to patch, if these persist.     Attention deficit hyperactivity disorder (ADHD), predominantly inattentive type  Unclear diagnosis. Per family has had some longstanding patterns of inattentiveness so this is possible. No clear diagnosis based on neuropsych testing, but unclear how the cog impairment confounds this testing. At this point, since they seem to have found benefit from stimulant, ok to continue.     Acquired hypothyroidism  Stable    Episode of recurrent major depressive disorder, unspecified depression episode severity (H)  Mood is stable    Age related osteoporosis, unspecified pathological fracture presence  Stop the fosamax given some GI complaints. Transition to reclast at follow up once neurology plan is in place.     Return in about 1 month (around 1/20/2025).    Dianne Friedman is a 69 year old, presenting for the following health issues:  Follow Up        12/20/2024    11:29 AM   Additional Questions   Roomed by marisela gary   Accompanied by sister         12/20/2024    11:29 AM   Patient Reported Additional Medications   Patient reports taking the following new medications none     HPI     pt and sister here to discuss what happened at  "neurology appt on  at [facility]   How are you doing with your depression since your last visit? No change  Are you having other symptoms that might be associated with depression? Yes:  memory loss  Have you had a significant life event?  No   Are you feeling anxious or having panic attacks?   No  Do you have any concerns with your use of alcohol or other drugs? No    Social History     Tobacco Use    Smoking status: Former     Current packs/day: 0.00     Average packs/day: 0.3 packs/day for 1 year (0.3 ttl pk-yrs)     Types: Cigarettes     Start date:      Quit date:      Years since quittin.0     Passive exposure: Past    Smokeless tobacco: Never    Tobacco comments:     quit ; no passive exposure   Vaping Use    Vaping status: Never Used   Substance Use Topics    Alcohol use: Yes     Comment: 1 drink beer and wine daily    Drug use: No         2023     8:03 AM 1/10/2024     8:15 AM 2024     2:37 PM   PHQ   PHQ-9 Total Score 8 0 5   Q9: Thoughts of better off dead/self-harm past 2 weeks Not at all Not at all Not at all         2023     9:00 AM 2023     8:05 AM 2024     2:40 PM   BRADLY-7 SCORE   Total Score  0 (minimal anxiety) 0 (minimal anxiety)   Total Score 0 0 0     Hypothyroidism Follow-up    Since last visit, patient describes the following symptoms: Weight stable, no hair loss, no skin changes, no constipation, no loose stools      Review of Systems  Constitutional, neuro, ENT, endocrine, pulmonary, cardiac, gastrointestinal, genitourinary, musculoskeletal, integument and psychiatric systems are negative, except as otherwise noted.      Objective    /78 (BP Location: Left arm, Patient Position: Sitting, Cuff Size: Adult Regular)   Pulse 80   Temp 98.7  F (37.1  C) (Tympanic)   Resp 18   Ht 1.651 m (5' 5\")   Wt 66.2 kg (145 lb 14.4 oz)   SpO2 97%   BMI 24.28 kg/m    Body mass index is 24.28 kg/m .  Physical Exam   GENERAL: alert and no " distress  RESP: lungs clear to auscultation - no rales, rhonchi or wheezes  CV: regular rates and rhythm, normal S1 S2, no S3 or S4, no murmur, click or rub, and no peripheral edema  MS: no gross musculoskeletal defects noted, no edema  NEURO: Normal strength and tone, mentation intact, speech normal, and cranial nerves 2-12 intact  PSYCH: mentation appears normal, affect normal/bright, and judgement and insight impaired    No results found for any visits on 12/20/24.        Signed Electronically by: Chey Wallis MD  The longitudinal plan of care for the diagnosis(es)/condition(s) as documented were addressed during this visit. Due to the added complexity in care, I will continue to support Idalia in the subsequent management and with ongoing continuity of care.

## 2024-12-22 ENCOUNTER — MYC MEDICAL ADVICE (OUTPATIENT)
Dept: FAMILY MEDICINE | Facility: OTHER | Age: 69
End: 2024-12-22

## 2024-12-24 PROBLEM — M81.0 AGE RELATED OSTEOPOROSIS, UNSPECIFIED PATHOLOGICAL FRACTURE PRESENCE: Status: ACTIVE | Noted: 2024-12-24

## 2024-12-24 PROBLEM — F02.80 ALZHEIMER'S DISEASE (H): Status: ACTIVE | Noted: 2024-12-24

## 2024-12-24 PROBLEM — G31.84 MCI (MILD COGNITIVE IMPAIRMENT): Status: RESOLVED | Noted: 2023-12-07 | Resolved: 2024-12-24

## 2024-12-24 PROBLEM — G30.9 ALZHEIMER'S DISEASE (H): Status: ACTIVE | Noted: 2024-12-24

## 2024-12-26 NOTE — TELEPHONE ENCOUNTER
Spoke to [facility]. No ritalin doses available and are being discontinued. Patient's are being switched to Adderall.     Dileep, do you have any recommendations for this? Dosing?    STACEY Garrison CNP on 12/26/2024 at 3:27 PM

## 2024-12-26 NOTE — PROGRESS NOTES
Please see [employer] message below.  Patient currently on Methylphenidate 30 mg and looking for alternative as states [facility] said they aren't making this any longer and can't get it.  Looks like looking online there are shortages of this medication. They are looking for alternative.  Please advise.  Thank you  JIMMIE Angulo CC

## 2025-01-13 DIAGNOSIS — R41.89 COGNITIVE DECLINE: ICD-10-CM

## 2025-01-13 DIAGNOSIS — R41.840 ATTENTION AND CONCENTRATION DEFICIT: ICD-10-CM

## 2025-01-13 DIAGNOSIS — R41.3 MEMORY LOSS: ICD-10-CM

## 2025-01-13 RX ORDER — DONEPEZIL HYDROCHLORIDE 10 MG/1
10 TABLET, FILM COATED ORAL AT BEDTIME
Qty: 30 TABLET | Refills: 0 | Status: SHIPPED | OUTPATIENT
Start: 2025-01-13

## 2025-01-13 NOTE — TELEPHONE ENCOUNTER
Miscellaneous Dementia Agents Failed    Rerun Protocol (1/13/2025 8:02 AM)    Medication indicated for associated diagnosis    Medication is associated with one or more of the following diagnoses:            Alzheimer's disease           Dementia           Schizophrenia

## 2025-01-13 NOTE — TELEPHONE ENCOUNTER
Aricept      Last Written Prescription Date:  11/14/24  Last Fill Quantity: 30,   # refills: 1  Last Office Visit: 12/20/24  Future Office visit:    Next 5 appointments (look out 90 days)      Jan 28, 2025 11:30 AM  (Arrive by 11:15 AM)  Provider Visit with Chey Wallis MD  [facility] [place] ([facility] ) [address]  [place] [address]  [phone]             Routing refill request to provider for review/approval because:

## 2025-02-05 ENCOUNTER — MYC MEDICAL ADVICE (OUTPATIENT)
Dept: FAMILY MEDICINE | Facility: OTHER | Age: 70
End: 2025-02-05

## 2025-02-10 DIAGNOSIS — F90.0 ATTENTION DEFICIT HYPERACTIVITY DISORDER (ADHD), PREDOMINANTLY INATTENTIVE TYPE: ICD-10-CM

## 2025-02-10 NOTE — TELEPHONE ENCOUNTER
Adderall XR      Last Written Prescription Date:  1/3/25  Last Fill Quantity: 30,   # refills: 0  Last Office Visit: 12/20/24  Future Office visit:    Next 5 appointments (look out 90 days)      Feb 17, 2025 1:00 PM  (Arrive by 12:45 PM)  Provider Visit with STACEY Garrison CNP  [facility] [place] ([facility] [place] ) [address]  [place] [address]  [phone]             Routing refill request to provider for review/approval because:

## 2025-02-11 RX ORDER — DEXTROAMPHETAMINE SACCHARATE, AMPHETAMINE ASPARTATE MONOHYDRATE, DEXTROAMPHETAMINE SULFATE AND AMPHETAMINE SULFATE 3.75; 3.75; 3.75; 3.75 MG/1; MG/1; MG/1; MG/1
15 CAPSULE, EXTENDED RELEASE ORAL DAILY
Qty: 30 CAPSULE | Refills: 0 | Status: SHIPPED | OUTPATIENT
Start: 2025-02-11

## 2025-02-13 DIAGNOSIS — R41.3 MEMORY LOSS: ICD-10-CM

## 2025-02-13 DIAGNOSIS — R41.840 ATTENTION AND CONCENTRATION DEFICIT: ICD-10-CM

## 2025-02-13 DIAGNOSIS — R41.89 COGNITIVE DECLINE: ICD-10-CM

## 2025-02-13 RX ORDER — DONEPEZIL HYDROCHLORIDE 10 MG/1
10 TABLET, FILM COATED ORAL AT BEDTIME
Qty: 30 TABLET | Refills: 11 | Status: SHIPPED | OUTPATIENT
Start: 2025-02-13

## 2025-02-13 NOTE — TELEPHONE ENCOUNTER
DONEPEZIL HCL 10 MG TABLET       Last Written Prescription Date:  01/13/2025  Last Fill Quantity: 30,   # refills: 0  Last Office Visit: 12/20/2024  Future Office visit:    Next 5 appointments (look out 90 days)      Feb 17, 2025 1:00 PM  (Arrive by 12:45 PM)  Provider Visit with STACEY Garrison CNP  [facility] ([facility] ) [address] [place] [address] AVE  [place] [address]  [phone]             Routing refill request to provider for review/approval because:  Miscellaneous Dementia Agents [id number]/13/2025 07:58 AM   Protocol Details Medication indicated for associated diagnosis       Kimberly Boecker, RN

## 2025-02-17 ENCOUNTER — OFFICE VISIT (OUTPATIENT)
Dept: FAMILY MEDICINE | Facility: OTHER | Age: 70
End: 2025-02-17
Payer: COMMERCIAL

## 2025-02-17 VITALS
OXYGEN SATURATION: 99 % | DIASTOLIC BLOOD PRESSURE: 85 MMHG | SYSTOLIC BLOOD PRESSURE: 130 MMHG | BODY MASS INDEX: 24.83 KG/M2 | HEIGHT: 65 IN | RESPIRATION RATE: 18 BRPM | HEART RATE: 75 BPM | TEMPERATURE: 98.9 F | WEIGHT: 149 LBS

## 2025-02-17 DIAGNOSIS — G30.9 ALZHEIMER'S DISEASE (H): Primary | ICD-10-CM

## 2025-02-17 DIAGNOSIS — E53.8 VITAMIN B12 DEFICIENCY DISEASE: ICD-10-CM

## 2025-02-17 DIAGNOSIS — E53.8 VITAMIN B12 DEFICIENCY (NON ANEMIC): ICD-10-CM

## 2025-02-17 DIAGNOSIS — F02.80 ALZHEIMER'S DISEASE (H): Primary | ICD-10-CM

## 2025-02-17 DIAGNOSIS — M81.0 AGE RELATED OSTEOPOROSIS, UNSPECIFIED PATHOLOGICAL FRACTURE PRESENCE: ICD-10-CM

## 2025-02-17 PROCEDURE — 99214 OFFICE O/P EST MOD 30 MIN: CPT

## 2025-02-17 PROCEDURE — G0463 HOSPITAL OUTPT CLINIC VISIT: HCPCS

## 2025-02-17 RX ORDER — EPINEPHRINE 1 MG/ML
0.3 INJECTION, SOLUTION INTRAMUSCULAR; SUBCUTANEOUS EVERY 5 MIN PRN
OUTPATIENT
Start: 2025-02-17

## 2025-02-17 RX ORDER — ACETAMINOPHEN 325 MG/1
650 TABLET ORAL
OUTPATIENT
Start: 2025-02-17

## 2025-02-17 RX ORDER — HEPARIN SODIUM,PORCINE 10 UNIT/ML
5-20 VIAL (ML) INTRAVENOUS DAILY PRN
OUTPATIENT
Start: 2025-02-17

## 2025-02-17 RX ORDER — DIPHENHYDRAMINE HYDROCHLORIDE 50 MG/ML
50 INJECTION INTRAMUSCULAR; INTRAVENOUS
Start: 2025-02-17

## 2025-02-17 RX ORDER — HEPARIN SODIUM (PORCINE) LOCK FLUSH IV SOLN 100 UNIT/ML 100 UNIT/ML
5 SOLUTION INTRAVENOUS
OUTPATIENT
Start: 2025-02-17

## 2025-02-17 RX ORDER — ALBUTEROL SULFATE 0.83 MG/ML
2.5 SOLUTION RESPIRATORY (INHALATION)
OUTPATIENT
Start: 2025-02-17

## 2025-02-17 RX ORDER — METHYLPREDNISOLONE SODIUM SUCCINATE 40 MG/ML
40 INJECTION INTRAMUSCULAR; INTRAVENOUS
Start: 2025-02-17

## 2025-02-17 RX ORDER — DIPHENHYDRAMINE HYDROCHLORIDE 50 MG/ML
25 INJECTION INTRAMUSCULAR; INTRAVENOUS
Start: 2025-02-17

## 2025-02-17 RX ORDER — ALBUTEROL SULFATE 90 UG/1
1-2 INHALANT RESPIRATORY (INHALATION)
Start: 2025-02-17

## 2025-02-17 RX ORDER — ZOLEDRONIC ACID 0.05 MG/ML
5 INJECTION, SOLUTION INTRAVENOUS ONCE
Start: 2025-02-17

## 2025-02-17 ASSESSMENT — ANXIETY QUESTIONNAIRES
4. TROUBLE RELAXING: NOT AT ALL
3. WORRYING TOO MUCH ABOUT DIFFERENT THINGS: NOT AT ALL
2. NOT BEING ABLE TO STOP OR CONTROL WORRYING: NOT AT ALL
GAD7 TOTAL SCORE: 0
GAD7 TOTAL SCORE: 0
1. FEELING NERVOUS, ANXIOUS, OR ON EDGE: NOT AT ALL
7. FEELING AFRAID AS IF SOMETHING AWFUL MIGHT HAPPEN: NOT AT ALL
6. BECOMING EASILY ANNOYED OR IRRITABLE: NOT AT ALL
5. BEING SO RESTLESS THAT IT IS HARD TO SIT STILL: NOT AT ALL

## 2025-02-17 ASSESSMENT — PATIENT HEALTH QUESTIONNAIRE - PHQ9: SUM OF ALL RESPONSES TO PHQ QUESTIONS 1-9: 0

## 2025-02-17 NOTE — PROGRESS NOTES
Assessment & Plan     Alzheimer's disease (H)  Neurology notes reviewed, plan to start lecanemab infusions next week. Now on aricept only, rivastigmine stopped at last visit. GI concerns are improved. Continue aricept. Weight is stable.    Age related osteoporosis, unspecified pathological fracture presence  Now off fosamax due to GI concerns at last visit, this is improved. Therapy plan added today for reclast. Now off vitamin d as elevated at 112 in the past.      Return in about 2 months (around 2025) for Follow up.    Dianne Friedman is a 69 year old, presenting for the following health issues:  Depression        2025    12:49 PM   Additional Questions   Roomed by marisela gary   Accompanied by sister     CHIQUIS     Seen on , stopped fosamax d/t concern with GI complaints. Also stopped rivastigmine, as sometimes forgetting.  Notes today that she has no further GI concerns - no nausea, abdominal pain, constipation.  Sister along wit patient today. No major concerns.    Depression   How are you doing with your depression since your last visit? No change  Are you having other symptoms that might be associated with depression? No  Have you had a significant life event?  No   Are you feeling anxious or having panic attacks?   No  Do you have any concerns with your use of alcohol or other drugs? No    Social History     Tobacco Use    Smoking status: Former     Current packs/day: 0.00     Average packs/day: 0.3 packs/day for 1 year (0.3 ttl pk-yrs)     Types: Cigarettes     Start date:      Quit date:      Years since quittin.1     Passive exposure: Past    Smokeless tobacco: Never    Tobacco comments:     quit ; no passive exposure   Vaping Use    Vaping status: Never Used   Substance Use Topics    Alcohol use: Yes     Comment: 1 drink beer and wine daily    Drug use: No         1/10/2024     8:15 AM 2024     2:37 PM 2025    12:50 PM   PHQ   PHQ-9 Total Score 0 5 0   Q9:  "Thoughts of better off dead/self-harm past 2 weeks Not at all Not at all Not at all         12/1/2023     8:05 AM 8/29/2024     2:40 PM 2/17/2025    12:52 PM   BRADLY-7 SCORE   Total Score 0 (minimal anxiety) 0 (minimal anxiety)    Total Score 0 0 0         2/17/2025    12:50 PM   Last PHQ-9   1.  Little interest or pleasure in doing things 0   2.  Feeling down, depressed, or hopeless 0   3.  Trouble falling or staying asleep, or sleeping too much 0   4.  Feeling tired or having little energy 0   5.  Poor appetite or overeating 0   6.  Feeling bad about yourself 0   7.  Trouble concentrating 0   8.  Moving slowly or restless 0   Q9: Thoughts of better off dead/self-harm past 2 weeks 0   PHQ-9 Total Score 0         2/17/2025    12:52 PM   BRADLY-7    1. Feeling nervous, anxious, or on edge 0   2. Not being able to stop or control worrying 0   3. Worrying too much about different things 0   4. Trouble relaxing 0   5. Being so restless that it is hard to sit still 0   6. Becoming easily annoyed or irritable 0   7. Feeling afraid, as if something awful might happen 0   BRADLY-7 Total Score 0       Suicide Assessment Five-step Evaluation and Treatment (SAFE-T)            Review of Systems  Constitutional, neuro, ENT, endocrine, pulmonary, cardiac, gastrointestinal, genitourinary, musculoskeletal, integument and psychiatric systems are negative, except as otherwise noted.      Objective    /85 (BP Location: Left arm, Patient Position: Sitting, Cuff Size: Adult Regular)   Pulse 75   Temp 98.9  F (37.2  C) (Tympanic)   Resp 18   Ht 1.651 m (5' 5\")   Wt 67.6 kg (149 lb)   SpO2 99%   BMI 24.79 kg/m    Body mass index is 24.79 kg/m .    Physical Exam   GENERAL: alert and no distress  RESP: lungs clear to auscultation - no rales, rhonchi or wheezes  CV: regular rate and rhythm, normal S1 S2, no S3 or S4, no murmur, click or rub, no peripheral edema  MS: no gross musculoskeletal defects noted, no edema  PSYCH: affect " normal/bright, judgement and insight impaired, and appearance well groomed            Signed Electronically by: STACEY Garrison CNP

## 2025-02-21 ENCOUNTER — LAB (OUTPATIENT)
Dept: LAB | Facility: OTHER | Age: 70
End: 2025-02-21
Payer: COMMERCIAL

## 2025-02-21 DIAGNOSIS — M81.0 AGE RELATED OSTEOPOROSIS, UNSPECIFIED PATHOLOGICAL FRACTURE PRESENCE: ICD-10-CM

## 2025-02-21 LAB
ALBUMIN SERPL BCG-MCNC: 4.6 G/DL (ref 3.5–5.2)
CALCIUM SERPL-MCNC: 9.2 MG/DL (ref 8.8–10.4)
CREAT SERPL-MCNC: 0.85 MG/DL (ref 0.51–0.95)
EGFRCR SERPLBLD CKD-EPI 2021: 74 ML/MIN/1.73M2

## 2025-02-21 PROCEDURE — 82565 ASSAY OF CREATININE: CPT | Mod: ZL

## 2025-02-21 PROCEDURE — 82040 ASSAY OF SERUM ALBUMIN: CPT | Mod: ZL

## 2025-02-21 PROCEDURE — 36415 COLL VENOUS BLD VENIPUNCTURE: CPT | Mod: ZL

## 2025-02-21 PROCEDURE — 82310 ASSAY OF CALCIUM: CPT | Mod: ZL

## 2025-03-04 ENCOUNTER — HOSPITAL ENCOUNTER (EMERGENCY)
Facility: HOSPITAL | Age: 70
End: 2025-03-04
Payer: COMMERCIAL

## 2025-03-23 ENCOUNTER — HEALTH MAINTENANCE LETTER (OUTPATIENT)
Age: 70
End: 2025-03-23

## 2025-03-25 DIAGNOSIS — E03.9 ACQUIRED HYPOTHYROIDISM: ICD-10-CM

## 2025-03-25 NOTE — TELEPHONE ENCOUNTER
Levothyroxine       Last Written Prescription Date:  3/28/2024  Last Fill Quantity: 90,   # refills: 2  Last Office Visit: 2/17/2025  Future Office visit:    Next 5 appointments (look out 90 days)      Jun 04, 2025 10:30 AM  (Arrive by 10:15 AM)  Provider Visit with Chey Wallis MD  [facility] [place] ([facility] [place] ) [address]  [place] [address]  [phone]

## 2025-03-26 RX ORDER — LEVOTHYROXINE SODIUM 88 UG/1
88 TABLET ORAL
Qty: 90 TABLET | Refills: 0 | Status: SHIPPED | OUTPATIENT
Start: 2025-03-26

## 2025-04-02 DIAGNOSIS — F33.9 EPISODE OF RECURRENT MAJOR DEPRESSIVE DISORDER, UNSPECIFIED DEPRESSION EPISODE SEVERITY: ICD-10-CM

## 2025-04-02 NOTE — TELEPHONE ENCOUNTER
Effexor      Last Written Prescription Date:  9/30/24  Last Fill Quantity: 90,   # refills: 1  Last Office Visit: 2/17/25  Future Office visit:    Next 5 appointments (look out 90 days)      Jun 04, 2025 10:30 AM  (Arrive by 10:15 AM)  Provider Visit with Chey Wallis MD  [facility] [place] ([facility] [place] ) [address]  [place] [address]  [phone]             Routing refill request to provider for review/approval because:

## 2025-04-03 RX ORDER — VENLAFAXINE HYDROCHLORIDE 150 MG/1
150 CAPSULE, EXTENDED RELEASE ORAL DAILY
Qty: 90 CAPSULE | Refills: 3 | Status: SHIPPED | OUTPATIENT
Start: 2025-04-03

## 2025-04-16 DIAGNOSIS — F90.0 ATTENTION DEFICIT HYPERACTIVITY DISORDER (ADHD), PREDOMINANTLY INATTENTIVE TYPE: ICD-10-CM

## 2025-04-16 RX ORDER — DEXTROAMPHETAMINE SACCHARATE, AMPHETAMINE ASPARTATE MONOHYDRATE, DEXTROAMPHETAMINE SULFATE AND AMPHETAMINE SULFATE 3.75; 3.75; 3.75; 3.75 MG/1; MG/1; MG/1; MG/1
15 CAPSULE, EXTENDED RELEASE ORAL DAILY
Qty: 30 CAPSULE | Refills: 0 | Status: SHIPPED | OUTPATIENT
Start: 2025-04-16

## 2025-04-16 NOTE — TELEPHONE ENCOUNTER
amphetamine-dextroamphetamine (ADDERALL XR) 15 MG 24 hr capsule       Last Written Prescription Date:  03/14/25  Last Fill Quantity: 30,   # refills: 0  Last Office Visit: 02/17/25  Future Office visit:    Next 5 appointments (look out 90 days)      Jun 04, 2025 10:30 AM  (Arrive by 10:15 AM)  Provider Visit with Chey Wallis MD  [facility] [place] ([facility] [place] ) [facility]  [place] [address]  [phone]

## 2025-04-16 NOTE — TELEPHONE ENCOUNTER
Rx Protocol Controlled Substance [id number]/16/2025 02:12 PM   Protocol Details Urine drug screeen results on file in past 12 months    Controlled Substance Agreement on file in last 12 months    Auto Fail - Please forward to Provider

## 2025-04-28 ENCOUNTER — TELEPHONE (OUTPATIENT)
Dept: FAMILY MEDICINE | Facility: OTHER | Age: 70
End: 2025-04-28

## 2025-04-28 NOTE — TELEPHONE ENCOUNTER
10:12 AM    Reason for Call: Phone Call    Description: Patient sister in law Shu Hoang calling ( Consent to Communicate does not list Shu on it ) She is wondering can Idalia get this MRI done at [place] instead of [facility] and needs this done before May 7 and she is scheduled for an infusion on May 7 and the MRI is scheduled down in [place] on May 6 and she needs this MRI done before the infusion.    The consent to communicate lists only Bina Hoang and she is out of the country she is in [place].     Was an appointment offered for this call? No  If yes : Appointment type              Date    Preferred method for responding to this message: Telephone Call  What is your phone number ? [phone]  Shu    If we cannot reach you directly, may we leave a detailed response at the number you provided? Yes    Can this message wait until your PCP/provider returns, if available today? No

## 2025-04-29 NOTE — TELEPHONE ENCOUNTER
Writer spoke with patient she states her appt for MR is with neurology LORENZO Rliey and she will keep this appt.  Rosi Porter LPN

## 2025-05-13 DIAGNOSIS — F90.0 ATTENTION DEFICIT HYPERACTIVITY DISORDER (ADHD), PREDOMINANTLY INATTENTIVE TYPE: ICD-10-CM

## 2025-05-13 RX ORDER — DEXTROAMPHETAMINE SACCHARATE, AMPHETAMINE ASPARTATE MONOHYDRATE, DEXTROAMPHETAMINE SULFATE AND AMPHETAMINE SULFATE 3.75; 3.75; 3.75; 3.75 MG/1; MG/1; MG/1; MG/1
15 CAPSULE, EXTENDED RELEASE ORAL DAILY
Qty: 30 CAPSULE | Refills: 0 | Status: SHIPPED | OUTPATIENT
Start: 2025-05-13

## 2025-05-13 NOTE — TELEPHONE ENCOUNTER
DEXTROAMP-AMPHET ER 15 MG CAP       Last Written Prescription Date:  04/16/2025  Last Fill Quantity: 30,   # refills: 0  Last Office Visit: 02/17/2025  Future Office visit:    Next 5 appointments (look out 90 days)      Jun 04, 2025 10:30 AM  (Arrive by 10:15 AM)  Provider Visit with Chey Wallis MD  [facility] [place] ([facility] [place] ) [address]  [place] [address]  [phone]             Routing refill request to provider for review/approval because:    Rx Protocol Controlled Substance [id number]/13/2025 09:50 AM   Protocol Details Urine drug screeen results on file in past 12 months    Controlled Substance Agreement on file in last 12 months    Auto Fail - Please forward to Provider        Kimberly Boecker, RN

## 2025-06-02 NOTE — PROGRESS NOTES
Assessment & Plan     Alzheimer's disease (H) / Episode of recurrent major depressive disorder, unspecified depression episode severity  Mood is quite stable. Cognition also appears stable. Tolerating leqembi well. MRIs reviewed and stable. Family does note increased frequency of somewhat paranoid delusions in the last 6+ mo. Pt has been accusing neighbor of stealing things from her home or taking her mail, etc. She agrees, the stealing is a concern of hers. In addition noting sensation of alexis vu a bit more. Family concerned this may indicate more Lewy body or FT dementia. We discussed these symptoms which are common also in Alzheimer's diease. Given some of the paranoia, we discussed decrease in adderall, this was added previously by PCP as option to help with possible adhd sx and cognition. At this time, they are not overly distressing and do not feel we need to add medication for these.     Age related osteoporosis, unspecified pathological fracture presence  Reclast given in feb. Pt tolerated well. Continue with this.     Attention deficit hyperactivity disorder (ADHD), predominantly inattentive type  As above, decrease adderall as dx unclear.   - amphetamine-dextroamphetamine (ADDERALL XR) 10 MG 24 hr capsule  Dispense: 30 capsule; Refill: 0    Encounter for screening mammogram for malignant neoplasm of breast  - MA Screen Bilateral w/Thaddeus    Follow-up  No follow-ups on file.    Dianne Friedman is a 69 year old, presenting for the following health issues:  Depression        6/4/2025    10:45 AM   Additional Questions   Roomed by Rosi Porter     History of Present Illness       Reason for visit:  Follow up for alzhwimers diagnosis    She eats 0-1 servings of fruits and vegetables daily.She consumes 1 sweetened beverage(s) daily.She exercises with enough effort to increase her heart rate 10 to 19 minutes per day.  She exercises with enough effort to increase her heart rate 3 or less days per week.  "  She is taking medications regularly.        Depression   How are you doing with your depression since your last visit? Improved   Are you having other symptoms that might be associated with depression? No  Have you had a significant life event?  No   Are you feeling anxious or having panic attacks?   No  Do you have any concerns with your use of alcohol or other drugs? No    Social History     Tobacco Use    Smoking status: Former     Current packs/day: 0.00     Average packs/day: 0.3 packs/day for 1 year (0.3 ttl pk-yrs)     Types: Cigarettes     Start date:      Quit date:      Years since quittin.4     Passive exposure: Past    Smokeless tobacco: Never    Tobacco comments:     quit ; no passive exposure   Vaping Use    Vaping status: Never Used   Substance Use Topics    Alcohol use: Yes     Comment: 1 drink beer and wine daily    Drug use: No         1/10/2024     8:15 AM 2024     2:37 PM 2025    12:50 PM   PHQ   PHQ-9 Total Score 0 5 0   Q9: Thoughts of better off dead/self-harm past 2 weeks Not at all Not at all Not at all         2023     8:05 AM 2024     2:40 PM 2025    12:52 PM   BRADLY-7 SCORE   Total Score 0 (minimal anxiety) 0 (minimal anxiety)    Total Score 0 0 0           Review of Systems  Constitutional, neuro, ENT, endocrine, pulmonary, cardiac, gastrointestinal, genitourinary, musculoskeletal, integument and psychiatric systems are negative, except as otherwise noted.      Objective    /72 (BP Location: Left arm, Patient Position: Sitting, Cuff Size: Adult Regular)   Pulse 74   Temp 97.5  F (36.4  C) (Tympanic)   Resp 16   Ht 1.635 m (5' 4.37\")   Wt 64.4 kg (142 lb)   SpO2 98%   BMI 24.09 kg/m    Body mass index is 24.09 kg/m .  Physical Exam   GENERAL: alert and no distress  RESP: lungs clear to auscultation - no rales, rhonchi or wheezes  CV: regular rates and rhythm, normal S1 S2, no S3 or S4, no murmur, click or rub, and no peripheral " edema  MS: no gross musculoskeletal defects noted, no edema  SKIN: no suspicious lesions or rashes  PSYCH: mentation appears normal, affect normal/bright    No results found for any visits on 06/04/25.        Signed Electronically by: Chey Wallis MD    The longitudinal plan of care for the diagnosis(es)/condition(s) as documented were addressed during this visit. Due to the added complexity in care, I will continue to support Idalia in the subsequent management and with ongoing continuity of care.

## 2025-06-04 ENCOUNTER — OFFICE VISIT (OUTPATIENT)
Dept: FAMILY MEDICINE | Facility: OTHER | Age: 70
End: 2025-06-04
Attending: FAMILY MEDICINE
Payer: COMMERCIAL

## 2025-06-04 VITALS
DIASTOLIC BLOOD PRESSURE: 72 MMHG | OXYGEN SATURATION: 98 % | BODY MASS INDEX: 24.24 KG/M2 | WEIGHT: 142 LBS | RESPIRATION RATE: 16 BRPM | SYSTOLIC BLOOD PRESSURE: 118 MMHG | TEMPERATURE: 97.5 F | HEART RATE: 74 BPM | HEIGHT: 64 IN

## 2025-06-04 DIAGNOSIS — F33.9 EPISODE OF RECURRENT MAJOR DEPRESSIVE DISORDER, UNSPECIFIED DEPRESSION EPISODE SEVERITY: ICD-10-CM

## 2025-06-04 DIAGNOSIS — Z12.31 ENCOUNTER FOR SCREENING MAMMOGRAM FOR MALIGNANT NEOPLASM OF BREAST: ICD-10-CM

## 2025-06-04 DIAGNOSIS — F02.80 ALZHEIMER'S DISEASE (H): Primary | ICD-10-CM

## 2025-06-04 DIAGNOSIS — F90.0 ATTENTION DEFICIT HYPERACTIVITY DISORDER (ADHD), PREDOMINANTLY INATTENTIVE TYPE: ICD-10-CM

## 2025-06-04 DIAGNOSIS — M81.0 AGE RELATED OSTEOPOROSIS, UNSPECIFIED PATHOLOGICAL FRACTURE PRESENCE: ICD-10-CM

## 2025-06-04 DIAGNOSIS — G30.9 ALZHEIMER'S DISEASE (H): Primary | ICD-10-CM

## 2025-06-04 PROCEDURE — G0463 HOSPITAL OUTPT CLINIC VISIT: HCPCS | Mod: 25

## 2025-06-04 RX ORDER — CYANOCOBALAMIN 1000 UG/ML
1000 INJECTION, SOLUTION INTRAMUSCULAR; SUBCUTANEOUS
COMMUNITY
Start: 2025-02-10

## 2025-06-04 RX ORDER — DEXTROAMPHETAMINE SACCHARATE, AMPHETAMINE ASPARTATE MONOHYDRATE, DEXTROAMPHETAMINE SULFATE AND AMPHETAMINE SULFATE 2.5; 2.5; 2.5; 2.5 MG/1; MG/1; MG/1; MG/1
10 CAPSULE, EXTENDED RELEASE ORAL DAILY
Qty: 30 CAPSULE | Refills: 0 | Status: SHIPPED | OUTPATIENT
Start: 2025-06-04

## 2025-06-04 RX ORDER — SYRINGE WITH NEEDLE, 1 ML 25GX5/8"
3 SYRINGE, EMPTY DISPOSABLE MISCELLANEOUS
COMMUNITY
Start: 2025-02-10

## 2025-06-04 ASSESSMENT — PAIN SCALES - GENERAL: PAINLEVEL_OUTOF10: NO PAIN (0)

## 2025-06-30 DIAGNOSIS — F02.A0 MILD EARLY ONSET ALZHEIMER'S DEMENTIA WITHOUT BEHAVIORAL DISTURBANCE, PSYCHOTIC DISTURBANCE, MOOD DISTURBANCE, OR ANXIETY (H): ICD-10-CM

## 2025-06-30 DIAGNOSIS — G30.0 MILD EARLY ONSET ALZHEIMER'S DEMENTIA WITHOUT BEHAVIORAL DISTURBANCE, PSYCHOTIC DISTURBANCE, MOOD DISTURBANCE, OR ANXIETY (H): ICD-10-CM

## 2025-06-30 DIAGNOSIS — E78.5 HYPERLIPIDEMIA LDL GOAL <70: ICD-10-CM

## 2025-06-30 RX ORDER — ASPIRIN 81 MG/1
81 TABLET ORAL DAILY
Qty: 90 TABLET | Refills: 3 | Status: SHIPPED | OUTPATIENT
Start: 2025-06-30

## 2025-06-30 RX ORDER — ATORVASTATIN CALCIUM 20 MG/1
20 TABLET, FILM COATED ORAL DAILY
Qty: 90 TABLET | Refills: 3 | Status: SHIPPED | OUTPATIENT
Start: 2025-06-30

## 2025-06-30 NOTE — TELEPHONE ENCOUNTER
Last signed by Hari    ATORVASTATIN 20 MG TABLET       Last Written Prescription Date:  09/30/2024  Last Fill Quantity: 90,   # refills: 2  Last Office Visit: 06/04/2025  Future Office visit:    Next 5 appointments (look out 90 days)      Sep 23, 2025 3:00 PM  (Arrive by 2:45 PM)  Adult Preventative Visit with Chey Wallis MD  [facility] [place] ([facility] [place] ) [address]  [place] [address]  [phone]             Routing refill request to provider for review/approval because:      Kimberly Boecker, RN

## 2025-07-07 ENCOUNTER — ANCILLARY PROCEDURE (OUTPATIENT)
Dept: MAMMOGRAPHY | Facility: OTHER | Age: 70
End: 2025-07-07
Attending: FAMILY MEDICINE
Payer: COMMERCIAL

## 2025-07-07 DIAGNOSIS — Z12.31 ENCOUNTER FOR SCREENING MAMMOGRAM FOR MALIGNANT NEOPLASM OF BREAST: ICD-10-CM

## 2025-07-07 DIAGNOSIS — F90.0 ATTENTION DEFICIT HYPERACTIVITY DISORDER (ADHD), PREDOMINANTLY INATTENTIVE TYPE: ICD-10-CM

## 2025-07-07 PROCEDURE — 77067 SCR MAMMO BI INCL CAD: CPT | Mod: TC

## 2025-07-08 ENCOUNTER — TELEPHONE (OUTPATIENT)
Dept: MAMMOGRAPHY | Facility: OTHER | Age: 70
End: 2025-07-08

## 2025-07-22 ENCOUNTER — HOSPITAL ENCOUNTER (OUTPATIENT)
Dept: MAMMOGRAPHY | Facility: OTHER | Age: 70
Discharge: HOME | End: 2025-07-22
Attending: FAMILY MEDICINE
Payer: COMMERCIAL

## 2025-07-22 DIAGNOSIS — R92.8 ABNORMAL FINDING ON BREAST IMAGING: ICD-10-CM

## 2025-07-22 PROCEDURE — G0279 TOMOSYNTHESIS, MAMMO: HCPCS | Mod: 26 | Performed by: RADIOLOGY

## 2025-07-22 PROCEDURE — 77061 BREAST TOMOSYNTHESIS UNI: CPT | Mod: TC,LT

## 2025-07-22 PROCEDURE — 77065 DX MAMMO INCL CAD UNI: CPT | Mod: 26 | Performed by: RADIOLOGY

## 2025-07-23 DIAGNOSIS — F90.0 ATTENTION DEFICIT HYPERACTIVITY DISORDER (ADHD), PREDOMINANTLY INATTENTIVE TYPE: ICD-10-CM

## 2025-07-23 RX ORDER — DEXTROAMPHETAMINE SACCHARATE, AMPHETAMINE ASPARTATE MONOHYDRATE, DEXTROAMPHETAMINE SULFATE AND AMPHETAMINE SULFATE 2.5; 2.5; 2.5; 2.5 MG/1; MG/1; MG/1; MG/1
10 CAPSULE, EXTENDED RELEASE ORAL DAILY
Qty: 30 CAPSULE | Refills: 0 | Status: SHIPPED | OUTPATIENT
Start: 2025-07-23

## 2025-07-23 NOTE — TELEPHONE ENCOUNTER
Adderall      Last Written Prescription Date:  6.4.25  Last Fill Quantity: #30,   # refills: 0  Last Office Visit: 6.4.25  Future Office visit:    Next 5 appointments (look out 90 days)      Sep 23, 2025 3:00 PM  (Arrive by 2:45 PM)  Adult Preventative Visit with Chey Wallis MD  [facility] [place] ([facility] [place] ) [address]  [place] [address]  [phone]             Routing refill request to provider for review/approval because:  Drug not on the [facility], [facility] or [facility] refill protocol or controlled substance

## 2025-08-25 ENCOUNTER — THERAPY VISIT (OUTPATIENT)
Dept: OCCUPATIONAL THERAPY | Facility: HOSPITAL | Age: 70
End: 2025-08-25
Attending: FAMILY MEDICINE
Payer: COMMERCIAL

## 2025-08-25 DIAGNOSIS — G30.9 ALZHEIMER'S DISEASE (H): Primary | ICD-10-CM

## 2025-08-25 DIAGNOSIS — F02.80 ALZHEIMER'S DISEASE (H): Primary | ICD-10-CM

## 2025-08-25 PROCEDURE — 97530 THERAPEUTIC ACTIVITIES: CPT | Mod: GO

## 2025-09-02 ENCOUNTER — THERAPY VISIT (OUTPATIENT)
Dept: OCCUPATIONAL THERAPY | Facility: HOSPITAL | Age: 70
End: 2025-09-02
Attending: FAMILY MEDICINE
Payer: COMMERCIAL

## 2025-09-02 DIAGNOSIS — G30.9 ALZHEIMER'S DISEASE (H): Primary | ICD-10-CM

## 2025-09-02 DIAGNOSIS — F90.0 ATTENTION DEFICIT HYPERACTIVITY DISORDER (ADHD), PREDOMINANTLY INATTENTIVE TYPE: ICD-10-CM

## 2025-09-02 DIAGNOSIS — F02.80 ALZHEIMER'S DISEASE (H): Primary | ICD-10-CM

## 2025-09-02 PROCEDURE — 97530 THERAPEUTIC ACTIVITIES: CPT | Mod: GO

## 2025-09-03 RX ORDER — DEXTROAMPHETAMINE SACCHARATE, AMPHETAMINE ASPARTATE MONOHYDRATE, DEXTROAMPHETAMINE SULFATE AND AMPHETAMINE SULFATE 2.5; 2.5; 2.5; 2.5 MG/1; MG/1; MG/1; MG/1
10 CAPSULE, EXTENDED RELEASE ORAL DAILY
Qty: 30 CAPSULE | Refills: 0 | Status: SHIPPED | OUTPATIENT
Start: 2025-09-03

## (undated) DEVICE — TUBING SUCTION 20FT N620A

## (undated) DEVICE — GLV-7.5 PROTEXIS PI CLASSIC LF/PF

## (undated) DEVICE — CONNECTOR ERBEFLO 2 PORT 20325-215

## (undated) DEVICE — CYSTOTOME-IRRIGATING  25G

## (undated) DEVICE — HANDPIECE-CAPSULEGUARD I/A STELLARIS

## (undated) DEVICE — INSTRUMENT WIPE-VISIWIPE

## (undated) DEVICE — CANNULA-VISCOFLOW 30G 9MM BEND

## (undated) DEVICE — CANNULA-NUCLEUS HYDRODISSECTOR

## (undated) DEVICE — CANNULA-VISCOFLOW 25G 9MM 45 DEGREE ANGLE

## (undated) DEVICE — BIN-CATARACT BIN

## (undated) DEVICE — KNIFE-KERATOME SLIT 2.8MM

## (undated) DEVICE — CANISTER SUCTION MEDI-VAC GUARDIAN 2000ML 90D 65651-220

## (undated) DEVICE — LENS DELIVERY SYSTEM-SOFPORT LI61AO (EZ-28)

## (undated) DEVICE — PACK-PHACO STELLARIS

## (undated) DEVICE — PACK-EYE-CUSTOM

## (undated) DEVICE — BIN-TECNIS DCB00 LENSES

## (undated) DEVICE — GLV-7.0 PROTEXIS PI CLASSIC LF/PF

## (undated) DEVICE — KNIFE-MICRO UNITOME 5.0MM

## (undated) DEVICE — SOL WATER IRRIG 1000ML BOTTLE 2F7114

## (undated) DEVICE — BETADINE 5% STERILE OPHTHALMIC SOLUTION 1 OZ.

## (undated) DEVICE — BIN-LENS IMPLANT CART

## (undated) RX ORDER — CYANOCOBALAMIN 1000 UG/ML
INJECTION, SOLUTION INTRAMUSCULAR; SUBCUTANEOUS
Status: DISPENSED
Start: 2023-07-26

## (undated) RX ORDER — CYANOCOBALAMIN 1000 UG/ML
INJECTION, SOLUTION INTRAMUSCULAR; SUBCUTANEOUS
Status: DISPENSED
Start: 2023-08-28

## (undated) RX ORDER — CYANOCOBALAMIN 1000 UG/ML
INJECTION, SOLUTION INTRAMUSCULAR; SUBCUTANEOUS
Status: DISPENSED
Start: 2024-01-08

## (undated) RX ORDER — LIDOCAINE HYDROCHLORIDE 20 MG/ML
INJECTION, SOLUTION EPIDURAL; INFILTRATION; INTRACAUDAL; PERINEURAL
Status: DISPENSED
Start: 2022-11-17